# Patient Record
Sex: MALE | Race: WHITE | NOT HISPANIC OR LATINO | Employment: OTHER | URBAN - METROPOLITAN AREA
[De-identification: names, ages, dates, MRNs, and addresses within clinical notes are randomized per-mention and may not be internally consistent; named-entity substitution may affect disease eponyms.]

---

## 2017-02-21 RX ORDER — SIMVASTATIN 40 MG
TABLET ORAL
Qty: 90 TABLET | Refills: 0 | Status: SHIPPED | OUTPATIENT
Start: 2017-02-21 | End: 2021-10-06 | Stop reason: SDUPTHER

## 2017-10-13 ENCOUNTER — TRANSCRIBE ORDERS (OUTPATIENT)
Dept: ADMINISTRATIVE | Facility: HOSPITAL | Age: 72
End: 2017-10-13

## 2017-10-13 ENCOUNTER — HOSPITAL ENCOUNTER (OUTPATIENT)
Dept: GENERAL RADIOLOGY | Facility: HOSPITAL | Age: 72
Discharge: HOME | End: 2017-10-13
Attending: INTERNAL MEDICINE | Admitting: INTERNAL MEDICINE

## 2017-10-13 DIAGNOSIS — R05.9 COUGH: ICD-10-CM

## 2017-10-13 DIAGNOSIS — R05.9 COUGH: Primary | ICD-10-CM

## 2017-10-13 PROCEDURE — 71020 HC CHEST PA AND LATERAL: CPT

## 2017-10-16 ENCOUNTER — TRANSCRIBE ORDERS (OUTPATIENT)
Dept: ADMINISTRATIVE | Facility: HOSPITAL | Age: 72
End: 2017-10-16

## 2017-10-16 DIAGNOSIS — J90 PLEURAL EFFUSION: Primary | ICD-10-CM

## 2017-10-18 ENCOUNTER — APPOINTMENT (OUTPATIENT)
Dept: CT IMAGING | Facility: HOSPITAL | Age: 72
End: 2017-10-18
Attending: INTERNAL MEDICINE

## 2018-05-08 ENCOUNTER — TRANSCRIBE ORDERS (OUTPATIENT)
Dept: ADMINISTRATIVE | Facility: HOSPITAL | Age: 73
End: 2018-05-08

## 2018-05-08 DIAGNOSIS — R31.0 GROSS HEMATURIA: Primary | ICD-10-CM

## 2018-05-14 ENCOUNTER — HOSPITAL ENCOUNTER (OUTPATIENT)
Dept: CT IMAGING | Facility: HOSPITAL | Age: 73
Discharge: HOME | End: 2018-05-14
Attending: UROLOGY | Admitting: UROLOGY

## 2018-05-14 DIAGNOSIS — R31.0 GROSS HEMATURIA: ICD-10-CM

## 2018-05-14 PROCEDURE — 0 IOPAMIDOL PER 1 ML: Performed by: UROLOGY

## 2018-05-14 PROCEDURE — 74178 CT ABD&PLV WO CNTR FLWD CNTR: CPT

## 2018-05-14 RX ADMIN — IOPAMIDOL 100 ML: 755 INJECTION, SOLUTION INTRAVENOUS at 07:50

## 2018-08-28 ENCOUNTER — TRANSCRIBE ORDERS (OUTPATIENT)
Dept: ADMINISTRATIVE | Facility: HOSPITAL | Age: 73
End: 2018-08-28

## 2018-08-28 ENCOUNTER — HOSPITAL ENCOUNTER (OUTPATIENT)
Dept: GENERAL RADIOLOGY | Facility: HOSPITAL | Age: 73
Discharge: HOME | End: 2018-08-28

## 2018-08-28 ENCOUNTER — HOSPITAL ENCOUNTER (OUTPATIENT)
Dept: GENERAL RADIOLOGY | Facility: HOSPITAL | Age: 73
Discharge: HOME | End: 2018-08-28
Admitting: NURSE PRACTITIONER

## 2018-08-28 DIAGNOSIS — M54.9 DORSALGIA: ICD-10-CM

## 2018-08-28 DIAGNOSIS — M15.0 PRIMARY GENERALIZED HYPERTROPHIC OSTEOARTHROSIS: ICD-10-CM

## 2018-08-28 DIAGNOSIS — M85.9 DISORDER OF BONE DENSITY AND STRUCTURE, UNSPECIFIED: Primary | ICD-10-CM

## 2018-08-28 DIAGNOSIS — M85.9 DISORDER OF BONE DENSITY AND STRUCTURE, UNSPECIFIED: ICD-10-CM

## 2018-08-28 PROCEDURE — 72110 X-RAY EXAM L-2 SPINE 4/>VWS: CPT

## 2018-08-28 PROCEDURE — 73521 X-RAY EXAM HIPS BI 2 VIEWS: CPT

## 2020-01-13 ENCOUNTER — HOSPITAL ENCOUNTER (OUTPATIENT)
Dept: GENERAL RADIOLOGY | Facility: HOSPITAL | Age: 75
Discharge: HOME | End: 2020-01-13
Admitting: INTERNAL MEDICINE

## 2020-01-13 ENCOUNTER — TRANSCRIBE ORDERS (OUTPATIENT)
Dept: ADMINISTRATIVE | Facility: HOSPITAL | Age: 75
End: 2020-01-13

## 2020-01-13 DIAGNOSIS — R05.3 CHRONIC COUGH: Primary | ICD-10-CM

## 2020-01-13 DIAGNOSIS — R05.3 CHRONIC COUGH: ICD-10-CM

## 2020-01-13 PROCEDURE — 71046 X-RAY EXAM CHEST 2 VIEWS: CPT

## 2020-06-09 ENCOUNTER — TRANSCRIBE ORDERS (OUTPATIENT)
Dept: OTHER | Facility: OTHER | Age: 75
End: 2020-06-09

## 2020-06-09 DIAGNOSIS — R18.8 OTHER ASCITES: Primary | ICD-10-CM

## 2021-08-30 NOTE — TELEPHONE ENCOUNTER
Rx Refill Note  Requested Prescriptions     Pending Prescriptions Disp Refills   • omeprazole (priLOSEC) 20 MG capsule [Pharmacy Med Name: OMEPRAZOLE 20 MG Capsule Delayed Release] 90 capsule      Sig: TAKE 1 CAPSULE DAILY 30 MINUTES BEFORE MORNING MEAL      Last office visit with prescribing clinician: Visit date not found      Next office visit with prescribing clinician: Visit date not found            Arleth Eddy MA  08/30/21, 07:34 EDT

## 2021-08-31 RX ORDER — OMEPRAZOLE 20 MG/1
CAPSULE, DELAYED RELEASE ORAL
Qty: 90 CAPSULE | Refills: 2 | Status: SHIPPED | OUTPATIENT
Start: 2021-08-31 | End: 2022-04-04

## 2021-10-06 ENCOUNTER — OFFICE VISIT (OUTPATIENT)
Dept: INTERNAL MEDICINE | Facility: CLINIC | Age: 76
End: 2021-10-06

## 2021-10-06 VITALS
SYSTOLIC BLOOD PRESSURE: 130 MMHG | HEIGHT: 66 IN | WEIGHT: 160.8 LBS | RESPIRATION RATE: 16 BRPM | OXYGEN SATURATION: 98 % | BODY MASS INDEX: 25.84 KG/M2 | TEMPERATURE: 96.3 F | HEART RATE: 61 BPM | DIASTOLIC BLOOD PRESSURE: 78 MMHG

## 2021-10-06 DIAGNOSIS — N30.00 ACUTE CYSTITIS WITHOUT HEMATURIA: Primary | ICD-10-CM

## 2021-10-06 DIAGNOSIS — M05.9 RHEUMATOID ARTHRITIS WITH POSITIVE RHEUMATOID FACTOR, INVOLVING UNSPECIFIED SITE (HCC): ICD-10-CM

## 2021-10-06 LAB
CLARITY, POC: CLEAR
COLOR UR: YELLOW
GLUCOSE UR STRIP-MCNC: NEGATIVE MG/DL
KETONES UR QL: NEGATIVE
LEUKOCYTE EST, POC: ABNORMAL
NITRITE UR-MCNC: NEGATIVE MG/ML
PH UR: 6 [PH] (ref 6–8)
PROT UR STRIP-MCNC: ABNORMAL MG/DL
RBC # UR STRIP: NEGATIVE /UL
SP GR UR: 1.03 (ref 1–1.03)

## 2021-10-06 PROCEDURE — 81002 URINALYSIS NONAUTO W/O SCOPE: CPT | Performed by: INTERNAL MEDICINE

## 2021-10-06 PROCEDURE — 99204 OFFICE O/P NEW MOD 45 MIN: CPT | Performed by: INTERNAL MEDICINE

## 2021-10-06 RX ORDER — CARVEDILOL 12.5 MG/1
TABLET ORAL
COMMUNITY
Start: 2021-08-30

## 2021-10-06 RX ORDER — TAMSULOSIN HYDROCHLORIDE 0.4 MG/1
1 CAPSULE ORAL DAILY
COMMUNITY

## 2021-10-06 RX ORDER — ATORVASTATIN CALCIUM 20 MG/1
1 TABLET, FILM COATED ORAL
COMMUNITY
Start: 2021-08-30

## 2021-10-06 RX ORDER — FUROSEMIDE 40 MG/1
TABLET ORAL
COMMUNITY
Start: 2021-08-30

## 2021-10-06 NOTE — PROGRESS NOTES
"Chief Complaint  Urinary Tract Infection (follow up from urologist )    Subjective          Moises Miranda presents to [facility]  Here with difficulty urinating, urinating more often, urgency; no dysuria; previously took tamsulosin 0.4mg bid and furosemide for this he states from urology; he recently changed the tamsulosin to 0.4mg daily instead of bid; did have cysitis when seen by urology 3 months ago    HFpEF and HFrEF, last ef 34%, follows with cardiology      Objective   Vital Signs:   /78 (BP Location: Right arm, Patient Position: Sitting, Cuff Size: Adult)   Pulse 61   Temp 96.3 °F (35.7 °C)   Resp 16   Ht 167.6 cm (66\")   Wt 72.9 kg (160 lb 12.8 oz)   SpO2 98%   BMI 25.95 kg/m²     Physical Exam  Constitutional:       Appearance: Normal appearance.   HENT:      Head: Normocephalic and atraumatic.      Right Ear: External ear normal.      Left Ear: External ear normal.      Nose: Nose normal.      Mouth/Throat:      Mouth: Mucous membranes are moist.   Eyes:      Extraocular Movements: Extraocular movements intact.      Conjunctiva/sclera: Conjunctivae normal.   Pulmonary:      Effort: Pulmonary effort is normal. No respiratory distress.   Musculoskeletal:         General: Normal range of motion.      Cervical back: Normal range of motion.   Neurological:      General: No focal deficit present.      Mental Status: He is alert. Mental status is at baseline.   Psychiatric:         Mood and Affect: Mood normal.         Behavior: Behavior normal.         Thought Content: Thought content normal.         Judgment: Judgment normal.        Result Review :                 Assessment and Plan    Diagnoses and all orders for this visit:    1. Acute cystitis without hematuria (Primary)  -     POCT urinalysis dipstick, manual  -     Urine Culture - Urine, Urine, Clean Catch    2. Rheumatoid arthritis with positive rheumatoid factor, involving unspecified site " (HCC)  -     Comprehensive metabolic panel  -     CBC w AUTO Differential    - check labs as above for stream issues  - tamsulosin 0.4mg daily dosing, consider increasing to 0.8mg dosing if needed  - check labs for RA, has not had in some time  - continue MTX, doing well    Follow Up   Return in about 3 months (around 1/6/2022) for Medicare Wellness.  Patient was given instructions and counseling regarding his condition or for health maintenance advice. Please see specific information pulled into the AVS if appropriate.

## 2021-10-07 ENCOUNTER — TELEPHONE (OUTPATIENT)
Dept: INTERNAL MEDICINE | Facility: CLINIC | Age: 76
End: 2021-10-07

## 2021-10-07 LAB
ALBUMIN SERPL-MCNC: 4.3 G/DL (ref 3.5–5.2)
ALBUMIN/GLOB SERPL: 2.9 G/DL
ALP SERPL-CCNC: 61 U/L (ref 39–117)
ALT SERPL-CCNC: 27 U/L (ref 1–41)
AST SERPL-CCNC: 25 U/L (ref 1–40)
BASOPHILS # BLD AUTO: ABNORMAL 10*3/UL
BASOPHILS # BLD MANUAL: 0.15 10*3/MM3 (ref 0–0.2)
BASOPHILS NFR BLD MANUAL: 2.1 % (ref 0–1.5)
BILIRUB SERPL-MCNC: 1.5 MG/DL (ref 0–1.2)
BUN SERPL-MCNC: 18 MG/DL (ref 8–23)
BUN/CREAT SERPL: 16.8 (ref 7–25)
CALCIUM SERPL-MCNC: 9.4 MG/DL (ref 8.6–10.5)
CHLORIDE SERPL-SCNC: 108 MMOL/L (ref 98–107)
CO2 SERPL-SCNC: 26.2 MMOL/L (ref 22–29)
CREAT SERPL-MCNC: 1.07 MG/DL (ref 0.76–1.27)
DIFFERENTIAL COMMENT: ABNORMAL
EOSINOPHIL # BLD AUTO: ABNORMAL 10*3/UL
EOSINOPHIL # BLD MANUAL: 0.3 10*3/MM3 (ref 0–0.4)
EOSINOPHIL NFR BLD AUTO: ABNORMAL %
EOSINOPHIL NFR BLD MANUAL: 4.1 % (ref 0.3–6.2)
ERYTHROCYTE [DISTWIDTH] IN BLOOD BY AUTOMATED COUNT: 13.5 % (ref 12.3–15.4)
GLOBULIN SER CALC-MCNC: 1.5 GM/DL
GLUCOSE SERPL-MCNC: 97 MG/DL (ref 65–99)
HCT VFR BLD AUTO: 40.5 % (ref 37.5–51)
HGB BLD-MCNC: 13.4 G/DL (ref 13–17.7)
LYMPHOCYTES # BLD AUTO: ABNORMAL 10*3/UL
LYMPHOCYTES # BLD MANUAL: 1.05 10*3/MM3 (ref 0.7–3.1)
LYMPHOCYTES NFR BLD AUTO: ABNORMAL %
LYMPHOCYTES NFR BLD MANUAL: 14.4 % (ref 19.6–45.3)
MCH RBC QN AUTO: 34.8 PG (ref 26.6–33)
MCHC RBC AUTO-ENTMCNC: 33.1 G/DL (ref 31.5–35.7)
MCV RBC AUTO: 105.2 FL (ref 79–97)
MONOCYTES # BLD MANUAL: 0.23 10*3/MM3 (ref 0.1–0.9)
MONOCYTES NFR BLD AUTO: ABNORMAL %
MONOCYTES NFR BLD MANUAL: 3.1 % (ref 5–12)
NEUTROPHILS # BLD MANUAL: 5.59 10*3/MM3 (ref 1.7–7)
NEUTROPHILS NFR BLD AUTO: ABNORMAL %
NEUTROPHILS NFR BLD MANUAL: 76.3 % (ref 42.7–76)
PLATELET # BLD AUTO: 154 10*3/MM3 (ref 140–450)
PLATELET BLD QL SMEAR: ABNORMAL
POTASSIUM SERPL-SCNC: 4.2 MMOL/L (ref 3.5–5.2)
PROT SERPL-MCNC: 5.8 G/DL (ref 6–8.5)
RBC # BLD AUTO: 3.85 10*6/MM3 (ref 4.14–5.8)
RBC MORPH BLD: ABNORMAL
SODIUM SERPL-SCNC: 145 MMOL/L (ref 136–145)
WBC # BLD AUTO: 7.32 10*3/MM3 (ref 3.4–10.8)

## 2021-10-07 NOTE — TELEPHONE ENCOUNTER
Caller: [email or web address]    Relationship: Emergency Contact    Best call back number: [phone]    Caller requesting test results: BLOOD WORK AND URINALYSIS    What test was performed: LABS    When was the test performed: 10/07/21    Where was the test performed: OFFICE/[facility]    Additional notes: PATIENT'S WIFE, ON [facility] [employer], CALLED IN STATING SHE MISSED A PHONE CALL FROM THE OFFICE.     PLEASE CALL BACK TO DISCUSS THE RESULTS OF PATIENT'S LABS.

## 2021-10-08 LAB
BACTERIA UR CULT: NORMAL
BACTERIA UR CULT: NORMAL

## 2021-10-15 LAB
FOLATE SERPL-MCNC: >20 NG/ML (ref 4.78–24.2)
HCYS SERPL-SCNC: 15.7 UMOL/L (ref 0–19.2)
Lab: NORMAL
Lab: NORMAL
METHYLMALONATE SERPL-SCNC: 139 NMOL/L (ref 0–378)
VIT B12 SERPL-MCNC: 438 PG/ML (ref 211–946)
WRITTEN AUTHORIZATION: NORMAL

## 2022-03-03 ENCOUNTER — OFFICE VISIT (OUTPATIENT)
Dept: INTERNAL MEDICINE | Facility: CLINIC | Age: 77
End: 2022-03-03

## 2022-03-03 VITALS
BODY MASS INDEX: 27.05 KG/M2 | DIASTOLIC BLOOD PRESSURE: 80 MMHG | WEIGHT: 168.3 LBS | OXYGEN SATURATION: 99 % | RESPIRATION RATE: 18 BRPM | HEART RATE: 75 BPM | SYSTOLIC BLOOD PRESSURE: 124 MMHG | HEIGHT: 66 IN | TEMPERATURE: 97.7 F

## 2022-03-03 DIAGNOSIS — I10 PRIMARY HYPERTENSION: ICD-10-CM

## 2022-03-03 DIAGNOSIS — Z00.00 WELL ADULT EXAM: Primary | ICD-10-CM

## 2022-03-03 DIAGNOSIS — Z11.4 ENCOUNTER FOR SCREENING FOR HIV: ICD-10-CM

## 2022-03-03 DIAGNOSIS — E53.8 FOLIC ACID DEFICIENCY: ICD-10-CM

## 2022-03-03 DIAGNOSIS — H61.22 HEARING LOSS OF LEFT EAR DUE TO CERUMEN IMPACTION: ICD-10-CM

## 2022-03-03 DIAGNOSIS — I25.5 ISCHEMIC CARDIOMYOPATHY: ICD-10-CM

## 2022-03-03 DIAGNOSIS — Z11.59 ENCOUNTER FOR HCV SCREENING TEST FOR LOW RISK PATIENT: ICD-10-CM

## 2022-03-03 LAB
BILIRUB BLD-MCNC: NEGATIVE MG/DL
CLARITY, POC: CLEAR
COLOR UR: YELLOW
EXPIRATION DATE: NORMAL
GLUCOSE UR STRIP-MCNC: NEGATIVE MG/DL
KETONES UR QL: NEGATIVE
LEUKOCYTE EST, POC: NEGATIVE
Lab: NORMAL
NITRITE UR-MCNC: NEGATIVE MG/ML
PH UR: 6.5 [PH] (ref 5–8)
PROT UR STRIP-MCNC: NEGATIVE MG/DL
RBC # UR STRIP: NEGATIVE /UL
SP GR UR: 1.01 (ref 1–1.03)
UROBILINOGEN UR QL: NORMAL

## 2022-03-03 PROCEDURE — 1170F FXNL STATUS ASSESSED: CPT | Performed by: INTERNAL MEDICINE

## 2022-03-03 PROCEDURE — G0009 ADMIN PNEUMOCOCCAL VACCINE: HCPCS | Performed by: INTERNAL MEDICINE

## 2022-03-03 PROCEDURE — 1126F AMNT PAIN NOTED NONE PRSNT: CPT | Performed by: INTERNAL MEDICINE

## 2022-03-03 PROCEDURE — G0439 PPPS, SUBSEQ VISIT: HCPCS | Performed by: INTERNAL MEDICINE

## 2022-03-03 PROCEDURE — 90670 PCV13 VACCINE IM: CPT | Performed by: INTERNAL MEDICINE

## 2022-03-03 PROCEDURE — 81003 URINALYSIS AUTO W/O SCOPE: CPT | Performed by: INTERNAL MEDICINE

## 2022-03-03 PROCEDURE — 90715 TDAP VACCINE 7 YRS/> IM: CPT | Performed by: INTERNAL MEDICINE

## 2022-03-03 PROCEDURE — 1159F MED LIST DOCD IN RCRD: CPT | Performed by: INTERNAL MEDICINE

## 2022-03-03 NOTE — PROGRESS NOTES
"Chief Complaint  Annual Exam    Subjective     {Problem List  Visit Diagnosis   Encounters  Notes  Medications  Labs  Result Review Imaging  Media :23}     Moises Miranda presents to [facility]  HLD, HTN    RA, following with rheum, doing well without issues      Objective   Vital Signs:   /80   Pulse 75   Temp 97.7 °F (36.5 °C)   Resp 18   Ht 167.6 cm (66\")   Wt 76.3 kg (168 lb 4.8 oz)   SpO2 99%   BMI 27.16 kg/m²     Physical Exam  Vitals and nursing note reviewed.   Constitutional:       General: He is not in acute distress.     Appearance: Normal appearance.   HENT:      Head: Normocephalic and atraumatic.      Right Ear: External ear normal.      Left Ear: External ear normal.      Nose: Nose normal.      Mouth/Throat:      Mouth: Mucous membranes are moist.      Pharynx: Oropharynx is clear.   Eyes:      Extraocular Movements: Extraocular movements intact.      Conjunctiva/sclera: Conjunctivae normal.      Pupils: Pupils are equal, round, and reactive to light.   Cardiovascular:      Rate and Rhythm: Normal rate and regular rhythm.      Pulses: Normal pulses.      Heart sounds: Normal heart sounds. No murmur heard.  No gallop.    Pulmonary:      Effort: Pulmonary effort is normal.      Breath sounds: Normal breath sounds.   Abdominal:      General: Abdomen is flat. Bowel sounds are normal. There is no distension.      Palpations: Abdomen is soft. There is no mass.      Tenderness: There is no abdominal tenderness.   Musculoskeletal:         General: No swelling. Normal range of motion.      Cervical back: Normal range of motion and neck supple.   Skin:     General: Skin is warm and dry.      Findings: No rash.   Neurological:      General: No focal deficit present.      Mental Status: He is alert and oriented to person, place, and time. Mental status is at baseline.   Psychiatric:         Mood and Affect: Mood normal.         Behavior: " Behavior normal.        Result Review :{Labs  Result Review  Imaging  Med Tab  Media  Procedures :23}   {The following data was reviewed by (Optional):59649}  {Ambulatory Labs (Optional):39818}  {Data reviewed (Optional):34945:::1}          Assessment and Plan {CC Problem List  Visit Diagnosis   ROS  Review (Popup)  Health Maintenance  Quality  BestPractice  Medications  SmartSets  SnapShot Encounters  Media :23}   Diagnoses and all orders for this visit:    1. Well adult exam (Primary)      {Time Spent (Optional):25047}  Follow Up {Instructions Charge Capture  Follow-up Communications :23}  No follow-ups on file.  Patient was given instructions and counseling regarding his condition or for health maintenance advice. Please see specific information pulled into the AVS if appropriate.

## 2022-03-03 NOTE — PROGRESS NOTES
The ABCs of the Annual Wellness Visit  Subsequent Medicare Wellness Visit    Chief Complaint   Patient presents with   • Annual Exam      Subjective    History of Present Illness:  Moises Miranda is a 76 y.o. male who presents for a Subsequent Medicare Wellness Visit. Recent cardiology visit, had his pacemaker rate increased    The following portions of the patient's history were reviewed and   updated as appropriate: allergies, current medications, past family history, past medical history, past social history, past surgical history and problem list.    Compared to one year ago, the patient feels his physical   health is the same.    Compared to one year ago, the patient feels his mental   health is the same.    Recent Hospitalizations:  He was not admitted to the hospital during the last year.       Current Medical Providers:  Patient Care Team:  Pedrito Stapels MD as PCP - General (Internal Medicine)    Outpatient Medications Prior to Visit   Medication Sig Dispense Refill   • alendronate (FOSAMAX) 70 MG tablet Take 70 mg by mouth every 7 days.     • atorvastatin (Lipitor) 20 MG tablet Take 1 tablet by mouth.     • carvedilol (COREG) 12.5 MG tablet TAKE 1/2 TABLET TWICE DAILY WITH MEALS     • folic acid (FOLVITE) 1 MG tablet Take 1 mg by mouth 3 (three) times a day.     • furosemide (LASIX) 40 MG tablet 1/2 tab PO q daily     • methotrexate (RHEUMATREX) 2.5 MG tablet Take 2.5 mg by mouth every 12 hours for 3 doses only each week.     • mupirocin (BACTROBAN) 2 % ointment Apply topically 3 (three) times a day 30 g 1   • omeprazole (priLOSEC) 20 MG capsule TAKE 1 CAPSULE DAILY 30 MINUTES BEFORE MORNING MEAL 90 capsule 2   • predniSONE (DELTASONE) 1 MG tablet Take 1 mg by mouth daily.     • tamsulosin (FLOMAX) 0.4 MG capsule 24 hr capsule Take 1 capsule by mouth Daily.     • doxycycline (VIBRAMYCIN) 100 MG capsule Take 1 capsule by mouth 2 (two) times a day. 28 capsule 0   • pantoprazole (PROTONIX) 40 MG EC tablet  "TAKE 1 TABLET EVERY DAY 90 tablet 1     No facility-administered medications prior to visit.       No opioid medication identified on active medication list. I have reviewed chart for other potential  high risk medication/s and harmful drug interactions in the elderly.          Aspirin is not on active medication list.  Aspirin use is not indicated based on review of current medical condition/s. Risk of harm outweighs potential benefits.  .    Patient Active Problem List   Diagnosis   • Hyperlipidemia   • GERD (gastroesophageal reflux disease)   • Rheumatoid arthritis (HCC)   • Prostate cancer (HCC)     Advance Care Planning  Advance Directive is not on file.  ACP discussion was held with the patient during this visit. Patient does not have an advance directive, information provided.          Objective    Vitals:    03/03/22 1024   BP: 124/80   Pulse: 75   Resp: 18   Temp: 97.7 °F (36.5 °C)   SpO2: 99%   Weight: 76.3 kg (168 lb 4.8 oz)   Height: 167.6 cm (66\")     BMI Readings from Last 1 Encounters:   03/03/22 27.16 kg/m²   BMI is above normal parameters. Recommendations include: exercise counseling and nutrition counseling    Does the patient have evidence of cognitive impairment? No    Physical Exam            HEALTH RISK ASSESSMENT    Smoking Status:  Social History     Tobacco Use   Smoking Status Former Smoker   Smokeless Tobacco Never Used   Tobacco Comment    quit 1990     Alcohol Consumption:  Social History     Substance and Sexual Activity   Alcohol Use Yes   • Alcohol/week: 3.0 standard drinks   • Types: 3 Cans of beer per week     Fall Risk Screen:    SHAYLA Fall Risk Assessment was completed, and patient is at LOW risk for falls.Assessment completed on:3/3/2022    Depression Screening:  PHQ-2/PHQ-9 Depression Screening 3/3/2022   Little interest or pleasure in doing things 0   Feeling down, depressed, or hopeless 0   Total Score 0       Health Habits and Functional and Cognitive Screening:  Functional " & Cognitive Status 3/3/2022   Do you have difficulty preparing food and eating? No   Do you have difficulty bathing yourself, getting dressed or grooming yourself? No   Do you have difficulty using the toilet? No   Do you have difficulty moving around from place to place? No   Do you have trouble with steps or getting out of a bed or a chair? No   Current Diet Well Balanced Diet   Dental Exam Up to date   Eye Exam Up to date   Exercise (times per week) 7 times per week   Current Exercises Include Light Weights;Walking   Do you need help using the phone?  No   Are you deaf or do you have serious difficulty hearing?  Yes   Do you need help with transportation? No   Do you need help shopping? No   Do you need help preparing meals?  No   Do you need help with housework?  No   Do you need help with laundry? No   Do you need help taking your medications? No   Do you need help managing money? No   Do you ever drive or ride in a car without wearing a seat belt? No   Have you felt unusual stress, anger or loneliness in the last month? No   Who do you live with? Spouse   If you need help, do you have trouble finding someone available to you? No   Have you been bothered in the last four weeks by sexual problems? No   Do you have difficulty concentrating, remembering or making decisions? No       Age-appropriate Screening Schedule:  Refer to the list below for future screening recommendations based on patient's age, sex and/or medical conditions. Orders for these recommended tests are listed in the plan section. The patient has been provided with a written plan.    Health Maintenance   Topic Date Due   • TDAP/TD VACCINES (1 - Tdap) Never done   • ZOSTER VACCINE (1 of 2) Never done   • LIPID PANEL  06/18/2021   • INFLUENZA VACCINE  08/01/2021              Assessment/Plan   CMS Preventative Services Quick Reference  Risk Factors Identified During Encounter  Cardiovascular Disease  Immunizations Discussed/Encouraged (specific  Immunizations; Tdap and Pneumococcal 23  The above risks/problems have been discussed with the patient.  Follow up actions/plans if indicated are seen below in the Assessment/Plan Section.  Pertinent information has been shared with the patient in the After Visit Summary.    Diagnoses and all orders for this visit:    1. Well adult exam (Primary)  -     Comprehensive Metabolic Panel  -     Lipid Panel  -     Hemoglobin A1c  -     CBC & Differential    2. Encounter for screening for HIV  -     HIV-1 / O / 2 Ag / Antibody 4th Generation    3. Encounter for HCV screening test for low risk patient  -     Hepatitis C Antibody    4. Primary hypertension  -     POCT urinalysis dipstick, automated    5. Hearing loss of left ear due to cerumen impaction  -     Ear Cerumen Removal    6. Folic acid deficiency  -     Vitamin B12  -     Folate    Other orders  -     Pneumococcal Conjugate Vaccine 13-Valent All (PCV13)  -     Tdap Vaccine Greater Than or Equal To 8yo IM      Ischemic cardiomyopathy with BiV/ICD  - EF 34% 6/2021; continue statin, bb, notes mention entresto but he does not have this on his record today, will get meds up to date from his home list    Well adult exam  - labs checked and evaluated    Colonoscopy - 2/13/20, polypectomy x1, next in 2023  Prostate - following with urology  Glaucoma - yearly  AAA - previous, quit over 30 years ago, scheduling  Lung cancer - previous, quit over 30 years ago  HIV - checking  HCV - checking  DM - checking  HLD - checking  Smoking - previous, quit over 30 years ago  Depression - no  Vaccines - pcv13, tdap today; ppsv last in 9/2017  Falls - no  Alcohol Screening - no    Discussed mental health, sexual health, substance use, abuse, anticipatory guidance given.        Follow Up:   No follow-ups on file.     An After Visit Summary and PPPS were made available to the patient.

## 2022-03-04 LAB
ALBUMIN SERPL-MCNC: 4.3 G/DL (ref 3.7–4.7)
ALBUMIN/GLOB SERPL: 2.2 {RATIO} (ref 1.2–2.2)
ALP SERPL-CCNC: 105 IU/L (ref 44–121)
ALT SERPL-CCNC: 40 IU/L (ref 0–44)
AST SERPL-CCNC: 25 IU/L (ref 0–40)
BASOPHILS # BLD AUTO: 0.1 X10E3/UL (ref 0–0.2)
BASOPHILS NFR BLD AUTO: 1 %
BILIRUB SERPL-MCNC: 0.9 MG/DL (ref 0–1.2)
BUN SERPL-MCNC: 15 MG/DL (ref 8–27)
BUN/CREAT SERPL: 14 (ref 10–24)
CALCIUM SERPL-MCNC: 9.3 MG/DL (ref 8.6–10.2)
CHLORIDE SERPL-SCNC: 106 MMOL/L (ref 96–106)
CHOLEST SERPL-MCNC: 178 MG/DL (ref 100–199)
CO2 SERPL-SCNC: 23 MMOL/L (ref 20–29)
CREAT SERPL-MCNC: 1.11 MG/DL (ref 0.76–1.27)
EGFR GENE MUT ANL BLD/T: 69 ML/MIN/1.73
EOSINOPHIL # BLD AUTO: 0.2 X10E3/UL (ref 0–0.4)
EOSINOPHIL NFR BLD AUTO: 2 %
ERYTHROCYTE [DISTWIDTH] IN BLOOD BY AUTOMATED COUNT: 13.6 % (ref 11.6–15.4)
FOLATE SERPL-MCNC: >20 NG/ML
GLOBULIN SER CALC-MCNC: 2 G/DL (ref 1.5–4.5)
GLUCOSE SERPL-MCNC: 93 MG/DL (ref 65–99)
HBA1C MFR BLD: 5.8 % (ref 4.8–5.6)
HCT VFR BLD AUTO: 40.5 % (ref 37.5–51)
HCV AB S/CO SERPL IA: <0.1 S/CO RATIO (ref 0–0.9)
HDLC SERPL-MCNC: 48 MG/DL
HGB BLD-MCNC: 13.7 G/DL (ref 13–17.7)
HIV 1+2 AB+HIV1 P24 AG SERPL QL IA: NON REACTIVE
IMM GRANULOCYTES # BLD AUTO: 0.1 X10E3/UL (ref 0–0.1)
IMM GRANULOCYTES NFR BLD AUTO: 1 %
LDLC SERPL CALC-MCNC: 88 MG/DL (ref 0–99)
LYMPHOCYTES # BLD AUTO: 1.1 X10E3/UL (ref 0.7–3.1)
LYMPHOCYTES NFR BLD AUTO: 15 %
MCH RBC QN AUTO: 34.5 PG (ref 26.6–33)
MCHC RBC AUTO-ENTMCNC: 33.8 G/DL (ref 31.5–35.7)
MCV RBC AUTO: 102 FL (ref 79–97)
MONOCYTES # BLD AUTO: 0.6 X10E3/UL (ref 0.1–0.9)
MONOCYTES NFR BLD AUTO: 8 %
NEUTROPHILS # BLD AUTO: 5.5 X10E3/UL (ref 1.4–7)
NEUTROPHILS NFR BLD AUTO: 73 %
PLATELET # BLD AUTO: 183 X10E3/UL (ref 150–450)
POTASSIUM SERPL-SCNC: 4.2 MMOL/L (ref 3.5–5.2)
PROT SERPL-MCNC: 6.3 G/DL (ref 6–8.5)
RBC # BLD AUTO: 3.97 X10E6/UL (ref 4.14–5.8)
SODIUM SERPL-SCNC: 145 MMOL/L (ref 134–144)
TRIGL SERPL-MCNC: 253 MG/DL (ref 0–149)
VIT B12 SERPL-MCNC: 498 PG/ML (ref 232–1245)
VLDLC SERPL CALC-MCNC: 42 MG/DL (ref 5–40)
WBC # BLD AUTO: 7.6 X10E3/UL (ref 3.4–10.8)

## 2022-03-25 ENCOUNTER — TRANSCRIBE ORDERS (OUTPATIENT)
Dept: ADMINISTRATIVE | Facility: HOSPITAL | Age: 77
End: 2022-03-25

## 2022-03-25 DIAGNOSIS — M85.9 DISORDER OF BONE DENSITY AND STRUCTURE, UNSPECIFIED: Primary | ICD-10-CM

## 2022-03-31 ENCOUNTER — APPOINTMENT (OUTPATIENT)
Dept: BONE DENSITY | Facility: HOSPITAL | Age: 77
End: 2022-03-31

## 2022-03-31 DIAGNOSIS — M85.9 DISORDER OF BONE DENSITY AND STRUCTURE, UNSPECIFIED: ICD-10-CM

## 2022-03-31 PROCEDURE — 77080 DXA BONE DENSITY AXIAL: CPT

## 2022-04-01 NOTE — TELEPHONE ENCOUNTER
Rx Refill Note  Requested Prescriptions     Pending Prescriptions Disp Refills   • omeprazole (priLOSEC) 20 MG capsule [Pharmacy Med Name: OMEPRAZOLE 20 MG Capsule Delayed Release] 90 capsule 2     Sig: TAKE 1 CAPSULE DAILY 30 MINUTES BEFORE MORNING MEAL      Last office visit with prescribing clinician: 3/3/2022      Next office visit with prescribing clinician: 9/7/2022            Arleth Eddy MA  04/01/22, 08:03 EDT

## 2022-04-04 RX ORDER — OMEPRAZOLE 20 MG/1
CAPSULE, DELAYED RELEASE ORAL
Qty: 90 CAPSULE | Refills: 2 | Status: SHIPPED | OUTPATIENT
Start: 2022-04-04 | End: 2022-11-04

## 2022-06-09 ENCOUNTER — OFFICE VISIT (OUTPATIENT)
Dept: INTERNAL MEDICINE | Facility: CLINIC | Age: 77
End: 2022-06-09

## 2022-06-09 VITALS
DIASTOLIC BLOOD PRESSURE: 82 MMHG | WEIGHT: 164 LBS | BODY MASS INDEX: 26.36 KG/M2 | HEIGHT: 66 IN | OXYGEN SATURATION: 97 % | SYSTOLIC BLOOD PRESSURE: 118 MMHG | RESPIRATION RATE: 18 BRPM | HEART RATE: 74 BPM | TEMPERATURE: 98.6 F

## 2022-06-09 DIAGNOSIS — R22.42 KNEE MASS, LEFT: ICD-10-CM

## 2022-06-09 DIAGNOSIS — E53.8 FOLIC ACID DEFICIENCY: ICD-10-CM

## 2022-06-09 DIAGNOSIS — R73.01 ELEVATED FASTING GLUCOSE: ICD-10-CM

## 2022-06-09 DIAGNOSIS — I10 PRIMARY HYPERTENSION: Primary | ICD-10-CM

## 2022-06-09 PROCEDURE — 99214 OFFICE O/P EST MOD 30 MIN: CPT | Performed by: INTERNAL MEDICINE

## 2022-06-09 NOTE — PROGRESS NOTES
"Chief Complaint  knot on left knee (First noticed x 1 week ago /Not painful /)    Subjective        Moises Miranda presents to [facility]  Here with left knee mass, unknown duration he states but has noticed the last 1 week; no pain, no swelling; no redness      Objective   Vital Signs:  /82   Pulse 74   Temp 98.6 °F (37 °C)   Resp 18   Ht 167.6 cm (66\")   Wt 74.4 kg (164 lb)   SpO2 97%   BMI 26.47 kg/m²   Estimated body mass index is 26.47 kg/m² as calculated from the following:    Height as of this encounter: 167.6 cm (66\").    Weight as of this encounter: 74.4 kg (164 lb).          Physical Exam  Constitutional:       Appearance: Normal appearance.   HENT:      Head: Normocephalic and atraumatic.      Right Ear: External ear normal.      Left Ear: External ear normal.      Nose: Nose normal.      Mouth/Throat:      Mouth: Mucous membranes are moist.   Eyes:      Extraocular Movements: Extraocular movements intact.      Conjunctiva/sclera: Conjunctivae normal.   Pulmonary:      Effort: Pulmonary effort is normal. No respiratory distress.   Musculoskeletal:         General: Normal range of motion.      Cervical back: Normal range of motion.      Comments: Left lateral knee with firm spongy mass 2x3cm just lateral to joint line, non mobile, no pain, no redness   Neurological:      General: No focal deficit present.      Mental Status: He is alert. Mental status is at baseline.   Psychiatric:         Mood and Affect: Mood normal.         Behavior: Behavior normal.         Thought Content: Thought content normal.         Judgment: Judgment normal.        Result Review :                Assessment and Plan   Diagnoses and all orders for this visit:    1. Primary hypertension (Primary)  -     Comprehensive Metabolic Panel  -     Lipid Panel With / Chol / HDL Ratio  -     Hemoglobin A1c    2. Folic acid deficiency  -     Folate    3. Knee mass, left  -     XR " Knee 1 or 2 View Left    4. Elevated fasting glucose   -     Hemoglobin A1c    - check xray as above; suspect may need further imaging with u/s vs ct/mri; further managemnet pending xray; conisder loculated effusion though less likely, consider RA changes  - check labs as above for routine follow up  - rtc pending above         Follow Up   No follow-ups on file.  Patient was given instructions and counseling regarding his condition or for health maintenance advice. Please see specific information pulled into the AVS if appropriate.

## 2022-06-10 LAB
ALBUMIN SERPL-MCNC: 3.9 G/DL (ref 3.7–4.7)
ALBUMIN/GLOB SERPL: 2 {RATIO} (ref 1.2–2.2)
ALP SERPL-CCNC: 74 IU/L (ref 44–121)
ALT SERPL-CCNC: 20 IU/L (ref 0–44)
AST SERPL-CCNC: 23 IU/L (ref 0–40)
BILIRUB SERPL-MCNC: 1 MG/DL (ref 0–1.2)
BUN SERPL-MCNC: 18 MG/DL (ref 8–27)
BUN/CREAT SERPL: 14 (ref 10–24)
CALCIUM SERPL-MCNC: 9.1 MG/DL (ref 8.6–10.2)
CHLORIDE SERPL-SCNC: 104 MMOL/L (ref 96–106)
CHOLEST SERPL-MCNC: 147 MG/DL (ref 100–199)
CHOLEST/HDLC SERPL: 4.6 RATIO (ref 0–5)
CO2 SERPL-SCNC: 25 MMOL/L (ref 20–29)
CREAT SERPL-MCNC: 1.28 MG/DL (ref 0.76–1.27)
EGFRCR SERPLBLD CKD-EPI 2021: 58 ML/MIN/1.73
FOLATE SERPL-MCNC: >20 NG/ML
GLOBULIN SER CALC-MCNC: 2 G/DL (ref 1.5–4.5)
GLUCOSE SERPL-MCNC: 110 MG/DL (ref 65–99)
HBA1C MFR BLD: 6 % (ref 4.8–5.6)
HDLC SERPL-MCNC: 32 MG/DL
LDLC SERPL CALC-MCNC: 85 MG/DL (ref 0–99)
POTASSIUM SERPL-SCNC: 4.6 MMOL/L (ref 3.5–5.2)
PROT SERPL-MCNC: 5.9 G/DL (ref 6–8.5)
SODIUM SERPL-SCNC: 142 MMOL/L (ref 134–144)
TRIGL SERPL-MCNC: 175 MG/DL (ref 0–149)
VLDLC SERPL CALC-MCNC: 30 MG/DL (ref 5–40)

## 2022-06-14 ENCOUNTER — HOSPITAL ENCOUNTER (OUTPATIENT)
Dept: GENERAL RADIOLOGY | Facility: HOSPITAL | Age: 77
Discharge: HOME | End: 2022-06-14
Admitting: INTERNAL MEDICINE

## 2022-06-14 PROCEDURE — 73560 X-RAY EXAM OF KNEE 1 OR 2: CPT

## 2022-06-15 ENCOUNTER — OFFICE VISIT (OUTPATIENT)
Dept: INTERNAL MEDICINE | Facility: CLINIC | Age: 77
End: 2022-06-15

## 2022-06-15 VITALS
OXYGEN SATURATION: 94 % | SYSTOLIC BLOOD PRESSURE: 120 MMHG | DIASTOLIC BLOOD PRESSURE: 74 MMHG | HEIGHT: 66 IN | TEMPERATURE: 98.6 F | WEIGHT: 164 LBS | BODY MASS INDEX: 26.36 KG/M2 | RESPIRATION RATE: 18 BRPM | HEART RATE: 84 BPM

## 2022-06-15 DIAGNOSIS — B34.9 VIRAL INFECTION: Primary | ICD-10-CM

## 2022-06-15 LAB
EXPIRATION DATE: NORMAL
FLUAV RNA RESP QL NAA+PROBE: NEGATIVE
FLUBV RNA RESP QL NAA+PROBE: NEGATIVE
INTERNAL CONTROL: NORMAL
Lab: NORMAL

## 2022-06-15 PROCEDURE — 87502 INFLUENZA DNA AMP PROBE: CPT | Performed by: INTERNAL MEDICINE

## 2022-06-15 PROCEDURE — 99213 OFFICE O/P EST LOW 20 MIN: CPT | Performed by: INTERNAL MEDICINE

## 2022-06-15 RX ORDER — AMOXICILLIN AND CLAVULANATE POTASSIUM 875; 125 MG/1; MG/1
1 TABLET, FILM COATED ORAL 2 TIMES DAILY
Qty: 10 TABLET | Refills: 0 | Status: SHIPPED | OUTPATIENT
Start: 2022-06-15 | End: 2022-06-20

## 2022-06-15 RX ORDER — AMOXICILLIN AND CLAVULANATE POTASSIUM 875; 125 MG/1; MG/1
1 TABLET, FILM COATED ORAL 2 TIMES DAILY
Qty: 10 TABLET | Refills: 0 | Status: SHIPPED | OUTPATIENT
Start: 2022-06-15 | End: 2022-06-15 | Stop reason: SDUPTHER

## 2022-06-16 LAB
LABCORP SARS-COV-2, NAA 2 DAY TAT: NORMAL
SARS-COV-2 RNA RESP QL NAA+PROBE: DETECTED

## 2022-06-21 NOTE — PROGRESS NOTES
"Chief Complaint  Cough, Nasal Congestion, Generalized Body Aches, Headache, and Fatigue    Subjective        Moises Miranda presents to [facility]  Here with 1 week of nasal congestion and cough, resolved and then with few days of body aches, congestion, headaches, fatigue; no fevers      Objective   Vital Signs:  /74   Pulse 84   Temp 98.6 °F (37 °C)   Resp 18   Ht 167.6 cm (66\")   Wt 74.4 kg (164 lb)   SpO2 94%   BMI 26.47 kg/m²   Estimated body mass index is 26.47 kg/m² as calculated from the following:    Height as of this encounter: 167.6 cm (66\").    Weight as of this encounter: 74.4 kg (164 lb).          Physical Exam  Vitals and nursing note reviewed.   Constitutional:       General: He is not in acute distress.     Appearance: Normal appearance.   HENT:      Head: Normocephalic and atraumatic.      Right Ear: External ear normal.      Left Ear: External ear normal.      Nose: Nose normal.      Mouth/Throat:      Mouth: Mucous membranes are moist.      Pharynx: Oropharynx is clear.   Eyes:      Extraocular Movements: Extraocular movements intact.      Conjunctiva/sclera: Conjunctivae normal.      Pupils: Pupils are equal, round, and reactive to light.   Cardiovascular:      Rate and Rhythm: Normal rate and regular rhythm.      Pulses: Normal pulses.      Heart sounds: Normal heart sounds. No murmur heard.    No gallop.   Pulmonary:      Effort: Pulmonary effort is normal.      Breath sounds: Normal breath sounds.   Abdominal:      General: Abdomen is flat. Bowel sounds are normal. There is no distension.      Palpations: Abdomen is soft. There is no mass.      Tenderness: There is no abdominal tenderness.   Musculoskeletal:         General: No swelling. Normal range of motion.      Cervical back: Normal range of motion and neck supple.   Skin:     General: Skin is warm and dry.      Findings: No rash.   Neurological:      General: No focal deficit " present.      Mental Status: He is alert and oriented to person, place, and time. Mental status is at baseline.   Psychiatric:         Mood and Affect: Mood normal.         Behavior: Behavior normal.        Result Review :                Assessment and Plan   Diagnoses and all orders for this visit:    1. Viral infection (Primary)  -     POCT Flu A&B, Molecular  -     COVID-19,[facility] ROUTINE, NP/OP SWAB IN TRANSPORT MEDIA OR ESWAB 72 HR TAT - Swab, Nasopharynx; Future  -     COVID-19,[facility] ROUTINE, NP/OP SWAB IN TRANSPORT MEDIA OR ESWAB 72 HR TAT - Swab, Nasopharynx    Other orders  -     Discontinue: amoxicillin-clavulanate (Augmentin) 875-125 MG per tablet; Take 1 tablet by mouth 2 (Two) Times a Day for 5 days.  Dispense: 10 tablet; Refill: 0  -     amoxicillin-clavulanate (Augmentin) 875-125 MG per tablet; Take 1 tablet by mouth 2 (Two) Times a Day for 5 days.  Dispense: 10 tablet; Refill: 0  -     SARS-CoV-2, CYNTHIA 2 DAY TAT - ,    - check covid as aboev; consider could be 2/2 bacterial sinusitis; discussed risks and benefits of augmentin  - rtc worsening, change in illness         Follow Up   No follow-ups on file.  Patient was given instructions and counseling regarding his condition or for health maintenance advice. Please see specific information pulled into the AVS if appropriate.

## 2022-06-29 ENCOUNTER — LAB (OUTPATIENT)
Dept: INTERNAL MEDICINE | Facility: CLINIC | Age: 77
End: 2022-06-29

## 2022-06-29 DIAGNOSIS — R79.89 ELEVATED SERUM CREATININE: Primary | ICD-10-CM

## 2022-06-30 LAB
BUN SERPL-MCNC: 16 MG/DL (ref 8–27)
BUN/CREAT SERPL: 15 (ref 10–24)
CALCIUM SERPL-MCNC: 8.9 MG/DL (ref 8.6–10.2)
CHLORIDE SERPL-SCNC: 104 MMOL/L (ref 96–106)
CO2 SERPL-SCNC: 26 MMOL/L (ref 20–29)
CREAT SERPL-MCNC: 1.1 MG/DL (ref 0.76–1.27)
EGFRCR SERPLBLD CKD-EPI 2021: 70 ML/MIN/1.73
GLUCOSE SERPL-MCNC: 94 MG/DL (ref 65–99)
POTASSIUM SERPL-SCNC: 5.2 MMOL/L (ref 3.5–5.2)
SODIUM SERPL-SCNC: 143 MMOL/L (ref 134–144)

## 2022-07-22 ENCOUNTER — OFFICE VISIT (OUTPATIENT)
Dept: INTERNAL MEDICINE | Facility: CLINIC | Age: 77
End: 2022-07-22

## 2022-07-22 VITALS
OXYGEN SATURATION: 99 % | HEIGHT: 66 IN | DIASTOLIC BLOOD PRESSURE: 68 MMHG | TEMPERATURE: 98.4 F | HEART RATE: 74 BPM | BODY MASS INDEX: 26.68 KG/M2 | SYSTOLIC BLOOD PRESSURE: 118 MMHG | WEIGHT: 166 LBS

## 2022-07-22 DIAGNOSIS — R22.42 KNEE MASS, LEFT: Primary | ICD-10-CM

## 2022-07-22 PROCEDURE — 99214 OFFICE O/P EST MOD 30 MIN: CPT | Performed by: INTERNAL MEDICINE

## 2022-07-25 NOTE — PROGRESS NOTES
"Chief Complaint  Mass (Bakers cyst left knee )    Subjective        Moises Miranda presents to [facility]  Left knee pain, has new swelling in the area; has not had mri to follow up u/s yet, missed these calls to schedule      Objective   Vital Signs:  /68   Pulse 74   Temp 98.4 °F (36.9 °C)   Ht 167.6 cm (66\")   Wt 75.3 kg (166 lb)   SpO2 99%   BMI 26.79 kg/m²   Estimated body mass index is 26.79 kg/m² as calculated from the following:    Height as of this encounter: 167.6 cm (66\").    Weight as of this encounter: 75.3 kg (166 lb).          Physical Exam  Constitutional:       Appearance: Normal appearance.   HENT:      Head: Normocephalic and atraumatic.      Right Ear: External ear normal.      Left Ear: External ear normal.      Nose: Nose normal.      Mouth/Throat:      Mouth: Mucous membranes are moist.   Eyes:      Extraocular Movements: Extraocular movements intact.      Conjunctiva/sclera: Conjunctivae normal.   Pulmonary:      Effort: Pulmonary effort is normal. No respiratory distress.   Musculoskeletal:         General: Normal range of motion.      Cervical back: Normal range of motion.      Comments: Left knee with lateral firm to soft moraes, now with inferomedial soft boggy swelling, no redness, no warmth   Neurological:      General: No focal deficit present.      Mental Status: He is alert. Mental status is at baseline.   Psychiatric:         Mood and Affect: Mood normal.         Behavior: Behavior normal.         Thought Content: Thought content normal.         Judgment: Judgment normal.        Result Review :                Assessment and Plan   Diagnoses and all orders for this visit:    1. Knee mass, left (Primary)  -     Ambulatory Referral to Orthopedic Surgery    - discussed with dr paul and bre RHOADES regarding his care, will get in office appt to evaluate; if bakers cyst may benefit from surgical evaluation/management " especially in light of the lateral knee mass  - continue tylenol asneeded for pain, nsaids if needed  - will need MRI  - rtc to follow up pending above         Follow Up   No follow-ups on file.  Patient was given instructions and counseling regarding his condition or for health maintenance advice. Please see specific information pulled into the AVS if appropriate.

## 2022-07-26 ENCOUNTER — OFFICE VISIT (OUTPATIENT)
Dept: ORTHOPEDIC SURGERY | Facility: CLINIC | Age: 77
End: 2022-07-26

## 2022-07-26 VITALS
HEIGHT: 60 IN | SYSTOLIC BLOOD PRESSURE: 124 MMHG | HEART RATE: 61 BPM | BODY MASS INDEX: 31.41 KG/M2 | DIASTOLIC BLOOD PRESSURE: 84 MMHG | WEIGHT: 160 LBS

## 2022-07-26 DIAGNOSIS — M79.89 MASS OF SOFT TISSUE OF KNEE: ICD-10-CM

## 2022-07-26 DIAGNOSIS — M17.12 OSTEOARTHRITIS OF LEFT KNEE, UNSPECIFIED OSTEOARTHRITIS TYPE: Primary | ICD-10-CM

## 2022-07-26 PROCEDURE — 99203 OFFICE O/P NEW LOW 30 MIN: CPT | Performed by: NURSE PRACTITIONER

## 2022-07-26 RX ORDER — SACUBITRIL AND VALSARTAN 24; 26 MG/1; MG/1
1 TABLET, FILM COATED ORAL 2 TIMES DAILY
COMMUNITY
End: 2022-09-07

## 2022-07-26 RX ORDER — METHYLPREDNISOLONE 4 MG/1
TABLET ORAL
Qty: 21 TABLET | Refills: 0 | Status: SHIPPED | OUTPATIENT
Start: 2022-07-26 | End: 2022-09-19

## 2022-07-26 RX ORDER — PREDNISONE 1 MG/1
5 TABLET ORAL DAILY
COMMUNITY

## 2022-07-26 NOTE — PROGRESS NOTES
Subjective:     Patient ID: Moises Miranda is a 77 y.o. male.    Chief Complaint:  Swelling left lower extremity, new patient to examiner  History of Present Illness  Moises Miranda 37-year-old male presents to clinic with spouse for evaluation of his left lower extremity.  The last few months began noticing swelling along the lateral joint line of the left knee x-ray images were obtained primary care office since that time is began noticing worsening of swelling to the proximal fibula denies known injury.  Does report a history of melanoma.  He also reports viral COVID infection in June.  MRI with and without contrast was ordered in June however due to infection was unable to proceed with testing.  He denies any erythema to the left knee.  He does have a history of rheumatoid arthritis currently treated with methotrexate as well and has oral prednisone.  Denies any prior MRI of the left knee in the past.  Denies any prior corticosteroid injections of the left knee.  X-ray imaging available for review in chart.  Denies significant presence of tenderness.  Denies other concerns present this time.     Social History     Occupational History   • Not on file   Tobacco Use   • Smoking status: Former Smoker   • Smokeless tobacco: Never Used   • Tobacco comment: quit 1990   Vaping Use   • Vaping Use: Never used   Substance and Sexual Activity   • Alcohol use: Yes     Alcohol/week: 3.0 standard drinks     Types: 3 Cans of beer per week   • Drug use: No   • Sexual activity: Defer      Past Medical History:   Diagnosis Date   • GERD (gastroesophageal reflux disease)    • Hyperlipidemia    • Rheumatoid arthritis (HCC)      Past Surgical History:   Procedure Laterality Date   • CARDIAC DEFIBRILLATOR PLACEMENT     • SKIN CANCER EXCISION  03/15/2013       Family History   Problem Relation Age of Onset   • Hypertension Neg Hx                Objective:  Physical Exam    Vital signs reviewed.   General: No acute distress.  Eyes:  "conjunctiva clear; pupils equally round and reactive  ENT: external ears and nose atraumatic; oropharynx clear  CV: no peripheral edema  Resp: normal respiratory effort  Skin: no rashes or wounds; normal turgor  Psych: mood and affect appropriate; recent and remote memory intact    Vitals:    07/26/22 1436   BP: 124/84   Pulse: 61   Weight: 72.6 kg (160 lb)   Height: 142.2 cm (56\")         07/26/22  1436   Weight: 72.6 kg (160 lb)     Body mass index is 35.87 kg/m².      Left Knee Exam     Tenderness   The patient is experiencing tenderness in the lateral joint line.    Range of Motion   Extension: 5   Flexion: 100     Tests   Mikal:  Medial - negative Lateral - negative  Varus: negative Valgus: negative  Lachman:  Anterior - 1+    Posterior - negative  Patellar apprehension: positive    Other   Erythema: absent  Sensation: normal  Pulse: present  Swelling: severe  Effusion: effusion present    Comments:  Positive swelling along the lateral joint line, positive swelling proximal fibula, posteriorly  Negative calf tenderness, negative Homans' sign  Positive swelling posterior joint line                 Imaging:  XR Knee 1 or 2 View Left    Result Date: 6/14/2022  1.  Soft tissue mass visible along the lateral knee joint margin as described. See above recommendations regarding follow-up MR imaging. 2.  2. Advanced tricompartment degenerative arthropathy.  This report was finalized on 6/14/2022 3:55 PM by Dr. Gagandeep Gandhi MD.    Independently reviewed 2 view x-ray images left knee previously completed available for viewing in chart advanced tricompartmental osteoarthritis with bone-on-bone articulation medial compartment and patellofemoral joint, reactive osteophytes in all 3 compartments overall varus deformity appreciated, soft tissue mass present along the lateral knee joint is recommended follow-up with MRI.    Assessment:        1. Osteoarthritis of left knee, unspecified osteoarthritis type    2. Mass of " soft tissue of knee           Plan:  1. Discussed plan of care with patient.  Discussed with patient and spouse will proceed with MRI with and without contrast to evaluate mass along the lateral aspect of the knee.  Plan plan to see him back in clinic after completion of testing discussed results and further plan of care.  Again due to the increased swelling he is experiencing we will try Medrol Dosepak to see if we can further reduce any swelling in the knee.  We will hold off on any injections till after completion of the MRI.  Continue weightbearing as tolerated.  All questions answered.  Orders:  No orders of the defined types were placed in this encounter.    New Medications Ordered This Visit   Medications   • methylPREDNISolone (MEDROL) 4 MG dose pack     Sig: Use as directed by package instructions     Dispense:  21 tablet     Refill:  0           I ordered and reviewed the VIET today.     Dragon Dictation utilized.

## 2022-08-09 ENCOUNTER — TELEPHONE (OUTPATIENT)
Dept: ORTHOPEDIC SURGERY | Facility: CLINIC | Age: 77
End: 2022-08-09

## 2022-08-09 ENCOUNTER — HOSPITAL ENCOUNTER (OUTPATIENT)
Dept: MRI IMAGING | Facility: HOSPITAL | Age: 77
End: 2022-08-09

## 2022-08-09 DIAGNOSIS — M17.12 OSTEOARTHRITIS OF LEFT KNEE, UNSPECIFIED OSTEOARTHRITIS TYPE: ICD-10-CM

## 2022-08-09 DIAGNOSIS — M79.89 MASS OF SOFT TISSUE OF KNEE: Primary | ICD-10-CM

## 2022-08-09 NOTE — TELEPHONE ENCOUNTER
Caller: PREM    Relationship to patient: WIFE    Best call back number: [phone]    Patient is needing: PATIENTS WIFE IS CALLING TO INFORM DARSHAN THAT THE MRI SHE ORDERED WAS CANCELLED DUE TO THE PATIENT HAVING A DEFIBRILLATOR. PLEASE ADVISE ON NEXT STEPS

## 2022-08-17 ENCOUNTER — HOSPITAL ENCOUNTER (OUTPATIENT)
Dept: GENERAL RADIOLOGY | Facility: HOSPITAL | Age: 77
Discharge: HOME | End: 2022-08-17

## 2022-08-17 ENCOUNTER — HOSPITAL ENCOUNTER (OUTPATIENT)
Dept: CT IMAGING | Facility: HOSPITAL | Age: 77
Discharge: HOME | End: 2022-08-17

## 2022-08-17 DIAGNOSIS — M79.89 MASS OF SOFT TISSUE OF KNEE: ICD-10-CM

## 2022-08-17 DIAGNOSIS — M17.12 OSTEOARTHRITIS OF LEFT KNEE, UNSPECIFIED OSTEOARTHRITIS TYPE: ICD-10-CM

## 2022-08-17 PROCEDURE — 73701 CT LOWER EXTREMITY W/DYE: CPT

## 2022-08-17 PROCEDURE — 0 IOPAMIDOL PER 1 ML: Performed by: NURSE PRACTITIONER

## 2022-08-17 RX ADMIN — IOPAMIDOL 100 ML: 755 INJECTION, SOLUTION INTRAVENOUS at 13:47

## 2022-08-23 DIAGNOSIS — M79.89 MASS OF SOFT TISSUE OF KNEE: Primary | ICD-10-CM

## 2022-08-23 DIAGNOSIS — M17.12 OSTEOARTHRITIS OF LEFT KNEE, UNSPECIFIED OSTEOARTHRITIS TYPE: ICD-10-CM

## 2022-09-07 ENCOUNTER — OFFICE VISIT (OUTPATIENT)
Dept: INTERNAL MEDICINE | Facility: CLINIC | Age: 77
End: 2022-09-07

## 2022-09-07 VITALS
DIASTOLIC BLOOD PRESSURE: 76 MMHG | WEIGHT: 157 LBS | HEART RATE: 60 BPM | OXYGEN SATURATION: 99 % | HEIGHT: 60 IN | SYSTOLIC BLOOD PRESSURE: 120 MMHG | TEMPERATURE: 97.7 F | RESPIRATION RATE: 18 BRPM | BODY MASS INDEX: 30.82 KG/M2

## 2022-09-07 DIAGNOSIS — E78.2 MIXED HYPERLIPIDEMIA: ICD-10-CM

## 2022-09-07 DIAGNOSIS — I10 PRIMARY HYPERTENSION: ICD-10-CM

## 2022-09-07 DIAGNOSIS — R73.03 PREDIABETES: Primary | ICD-10-CM

## 2022-09-07 DIAGNOSIS — I50.20 HFREF (HEART FAILURE WITH REDUCED EJECTION FRACTION): ICD-10-CM

## 2022-09-07 PROCEDURE — 99214 OFFICE O/P EST MOD 30 MIN: CPT | Performed by: INTERNAL MEDICINE

## 2022-09-07 RX ORDER — SACUBITRIL AND VALSARTAN 24; 26 MG/1; MG/1
1 TABLET, FILM COATED ORAL 2 TIMES DAILY
Qty: 60 TABLET | Refills: 3 | Status: SHIPPED | OUTPATIENT
Start: 2022-09-07

## 2022-09-07 RX ORDER — SPIRONOLACTONE 25 MG/1
TABLET ORAL
COMMUNITY
Start: 2022-08-27

## 2022-09-07 NOTE — PROGRESS NOTES
"Chief Complaint  knee mass (Follow up )    Subjective        Moises Miranda presents to [facility]  Left knee mass, following with ortho, recent CT with possible benign appearing cystic lesion vs malignant mass though malignancy is less likely; feels this is not improving    HTN, doing well, no chest pain, sob, headaches, vision changes, no lows; keeping home log, normal        Objective   Vital Signs:  /76   Pulse 60   Temp 97.7 °F (36.5 °C)   Resp 18   Ht 142.2 cm (56\")   Wt 71.2 kg (157 lb)   SpO2 99%   BMI 35.20 kg/m²   Estimated body mass index is 35.2 kg/m² as calculated from the following:    Height as of this encounter: 142.2 cm (56\").    Weight as of this encounter: 71.2 kg (157 lb).          Physical Exam  Vitals and nursing note reviewed.   Constitutional:       General: He is not in acute distress.     Appearance: Normal appearance.   HENT:      Head: Normocephalic and atraumatic.      Right Ear: External ear normal.      Left Ear: External ear normal.      Nose: Nose normal.      Mouth/Throat:      Mouth: Mucous membranes are moist.      Pharynx: Oropharynx is clear.   Eyes:      Extraocular Movements: Extraocular movements intact.      Conjunctiva/sclera: Conjunctivae normal.      Pupils: Pupils are equal, round, and reactive to light.   Cardiovascular:      Rate and Rhythm: Normal rate and regular rhythm.      Pulses: Normal pulses.      Heart sounds: Normal heart sounds. No murmur heard.    No gallop.   Pulmonary:      Effort: Pulmonary effort is normal.      Breath sounds: Normal breath sounds.   Abdominal:      General: Abdomen is flat. Bowel sounds are normal. There is no distension.      Palpations: Abdomen is soft. There is no mass.      Tenderness: There is no abdominal tenderness.   Musculoskeletal:         General: No swelling. Normal range of motion.      Cervical back: Normal range of motion and neck supple.   Skin:     General: " Skin is warm and dry.      Findings: No rash.   Neurological:      General: No focal deficit present.      Mental Status: He is alert and oriented to person, place, and time. Mental status is at baseline.   Psychiatric:         Mood and Affect: Mood normal.         Behavior: Behavior normal.        Result Review :                Assessment and Plan   Diagnoses and all orders for this visit:    1. Prediabetes (Primary)  -     Hemoglobin A1c    2. Primary hypertension  -     Comprehensive Metabolic Panel    3. Mixed hyperlipidemia  -     Lipid Panel With / Chol / HDL Ratio    4. HFrEF (heart failure with reduced ejection fraction) (HCC)    - htn, at goal, continue coreg, spironolactone  - hld, doing well with atorva 20mg  - preDM, limit carbs, sugars, check labs today  - hfref, s/p biv/icd, taking bb, statin, lasix intermittently, entresto, spironolactone, consider adding sglt2 med though with multiple upcoming procedures will avoid making changes that could alter elextrontlytes and complicate the picture; rtc couple of months and discuss further         Follow Up   No follow-ups on file.  Patient was given instructions and counseling regarding his condition or for health maintenance advice. Please see specific information pulled into the AVS if appropriate.

## 2022-09-08 LAB
ALBUMIN SERPL-MCNC: 4.6 G/DL (ref 3.5–5.2)
ALBUMIN/GLOB SERPL: 3.5 G/DL
ALP SERPL-CCNC: 70 U/L (ref 39–117)
ALT SERPL-CCNC: 14 U/L (ref 1–41)
AST SERPL-CCNC: 19 U/L (ref 1–40)
BILIRUB SERPL-MCNC: 1.1 MG/DL (ref 0–1.2)
BUN SERPL-MCNC: 23 MG/DL (ref 8–23)
BUN/CREAT SERPL: 21.9 (ref 7–25)
CALCIUM SERPL-MCNC: 9.3 MG/DL (ref 8.6–10.5)
CHLORIDE SERPL-SCNC: 103 MMOL/L (ref 98–107)
CHOLEST SERPL-MCNC: 160 MG/DL (ref 0–200)
CHOLEST/HDLC SERPL: 3.33 {RATIO}
CO2 SERPL-SCNC: 28 MMOL/L (ref 22–29)
CREAT SERPL-MCNC: 1.05 MG/DL (ref 0.76–1.27)
EGFRCR-CYS SERPLBLD CKD-EPI 2021: 73.6 ML/MIN/1.73
GLOBULIN SER CALC-MCNC: 1.3 GM/DL
GLUCOSE SERPL-MCNC: 93 MG/DL (ref 65–99)
HBA1C MFR BLD: 5.7 % (ref 4.8–5.6)
HDLC SERPL-MCNC: 48 MG/DL (ref 40–60)
LDLC SERPL CALC-MCNC: 87 MG/DL (ref 0–100)
POTASSIUM SERPL-SCNC: 4.3 MMOL/L (ref 3.5–5.2)
PROT SERPL-MCNC: 5.9 G/DL (ref 6–8.5)
SODIUM SERPL-SCNC: 142 MMOL/L (ref 136–145)
TRIGL SERPL-MCNC: 142 MG/DL (ref 0–150)
VLDLC SERPL CALC-MCNC: 25 MG/DL (ref 5–40)

## 2022-09-13 ENCOUNTER — HOSPITAL ENCOUNTER (OUTPATIENT)
Dept: ULTRASOUND IMAGING | Facility: HOSPITAL | Age: 77
Discharge: HOME | End: 2022-09-13
Admitting: NURSE PRACTITIONER

## 2022-09-13 DIAGNOSIS — M79.89 MASS OF SOFT TISSUE OF KNEE: ICD-10-CM

## 2022-09-13 LAB
APPEARANCE FLD: ABNORMAL
COLOR FLD: ABNORMAL
CRYSTALS FLD MICRO: NORMAL
LYMPHOCYTES NFR FLD MANUAL: 50 %
NEUTROPHILS NFR FLD MANUAL: 50 %
RBC # FLD AUTO: ABNORMAL /MM3
WBC # FLD AUTO: 986 /MM3

## 2022-09-13 PROCEDURE — 87070 CULTURE OTHR SPECIMN AEROBIC: CPT | Performed by: NURSE PRACTITIONER

## 2022-09-13 PROCEDURE — 87075 CULTR BACTERIA EXCEPT BLOOD: CPT | Performed by: NURSE PRACTITIONER

## 2022-09-13 PROCEDURE — 87015 SPECIMEN INFECT AGNT CONCNTJ: CPT | Performed by: NURSE PRACTITIONER

## 2022-09-13 PROCEDURE — 89060 EXAM SYNOVIAL FLUID CRYSTALS: CPT | Performed by: NURSE PRACTITIONER

## 2022-09-13 PROCEDURE — 89051 BODY FLUID CELL COUNT: CPT | Performed by: NURSE PRACTITIONER

## 2022-09-13 PROCEDURE — 87205 SMEAR GRAM STAIN: CPT | Performed by: NURSE PRACTITIONER

## 2022-09-13 PROCEDURE — 76942 ECHO GUIDE FOR BIOPSY: CPT

## 2022-09-13 PROCEDURE — 87102 FUNGUS ISOLATION CULTURE: CPT | Performed by: NURSE PRACTITIONER

## 2022-09-13 PROCEDURE — 0 LIDOCAINE 1 % SOLUTION: Performed by: NURSE PRACTITIONER

## 2022-09-13 RX ORDER — LIDOCAINE HYDROCHLORIDE 10 MG/ML
5 INJECTION, SOLUTION INFILTRATION; PERINEURAL ONCE
Status: COMPLETED | OUTPATIENT
Start: 2022-09-13 | End: 2022-09-13

## 2022-09-13 RX ADMIN — LIDOCAINE HYDROCHLORIDE 5 ML: 10 INJECTION, SOLUTION INFILTRATION; PERINEURAL at 11:20

## 2022-09-13 RX ADMIN — SODIUM BICARBONATE 0.5 MEQ: 0.2 INJECTION, SOLUTION INTRAVENOUS at 11:20

## 2022-09-18 LAB
BACTERIA FLD CULT: NORMAL
BACTERIA SPEC ANAEROBE CULT: NORMAL
GRAM STN SPEC: NORMAL
GRAM STN SPEC: NORMAL

## 2022-09-19 ENCOUNTER — PRE-ADMISSION TESTING (OUTPATIENT)
Dept: PREADMISSION TESTING | Facility: HOSPITAL | Age: 77
End: 2022-09-19

## 2022-09-19 VITALS
BODY MASS INDEX: 31.41 KG/M2 | HEIGHT: 60 IN | DIASTOLIC BLOOD PRESSURE: 72 MMHG | RESPIRATION RATE: 16 BRPM | SYSTOLIC BLOOD PRESSURE: 110 MMHG | OXYGEN SATURATION: 97 % | HEART RATE: 71 BPM | WEIGHT: 160 LBS

## 2022-09-19 LAB
DEPRECATED RDW RBC AUTO: 55.6 FL (ref 37–54)
ERYTHROCYTE [DISTWIDTH] IN BLOOD BY AUTOMATED COUNT: 14.4 % (ref 12.3–15.4)
HCT VFR BLD AUTO: 40.8 % (ref 37.5–51)
HGB BLD-MCNC: 13.3 G/DL (ref 13–17.7)
MCH RBC QN AUTO: 34.5 PG (ref 26.6–33)
MCHC RBC AUTO-ENTMCNC: 32.6 G/DL (ref 31.5–35.7)
MCV RBC AUTO: 106 FL (ref 79–97)
PLATELET # BLD AUTO: 157 10*3/MM3 (ref 140–450)
PMV BLD AUTO: 12.6 FL (ref 6–12)
QT INTERVAL: 494 MS
RBC # BLD AUTO: 3.85 10*6/MM3 (ref 4.14–5.8)
WBC NRBC COR # BLD: 8.19 10*3/MM3 (ref 3.4–10.8)

## 2022-09-19 PROCEDURE — 93005 ELECTROCARDIOGRAM TRACING: CPT

## 2022-09-19 PROCEDURE — 36415 COLL VENOUS BLD VENIPUNCTURE: CPT

## 2022-09-19 PROCEDURE — 93010 ELECTROCARDIOGRAM REPORT: CPT | Performed by: INTERNAL MEDICINE

## 2022-09-19 PROCEDURE — 85027 COMPLETE CBC AUTOMATED: CPT | Performed by: UROLOGY

## 2022-09-19 NOTE — DISCHARGE INSTRUCTIONS
PRE-ADMISSION TESTING INSTRUCTIONS FOR ADULTS    Take these medications the morning of surgery with a small sip of water: Omeprazole, Carvedilol      Do not take any insulin or diabetes medications the morning of surgery.      No aspirin, advil, aleve, ibuprofen, naproxen, diet pills, decongestants, or herbal/vitamins for a week prior to surgery.   Tylenol/Acetaminophen is okay to take if needed.    General Instructions:    DO NOT EAT SOLID FOOD AFTER MIDNIGHT THE NIGHT BEFORE SURGERY. No gum, mints, or hard candy after midnight the night before surgery.  You may drink clear liquids the day of surgery up until 2 hours before your arrival time.  Clear liquids are liquids you can see through. Nothing RED in color.    Plain water    Sports drinks  Sodas     Gelatin (Jell-O)  Fruit juices without pulp such as white grape juice and apple juice  Popsicles that contain no fruit or yogurt  Tea or coffee (no cream or milk added)    It is beneficial for you to have a clear drink that contains carbohydrates just before you leave your house and before your fasting time begins.  We suggest a 20 ounce bottle of Gatorade or Powerade for non-diabetic patients or a 20 ounce bottle of G2 or Powerade Zero for diabetic patients.     Patients who avoid smoking, chewing tobacco and alcohol for 4 weeks prior to surgery have a reduced risk of post-operative complications.  If at all possible, quit smoking as many days before surgery as you can.    Do not smoke, use chewing tobacco or drink alcohol the day of surgery    Bring your C-PAP/ BI-PAP machine if you use one.  Wear clean comfortable clothes and socks.  Do not wear contact lenses, lotion, deodorant, or make-up.  Bring a case for your glasses if applicable. You may brush your teeth the morning of surgery.  You may wear dentures/partials, do not put adhesive/glue on them.  Leave all other jewelry and valuables at home.      Preventing a Surgical Site Infection:    Shower the night  before and on the morning of surgery using the chlorhexidine soap you were given.  Use a clean washcloth with the soap.  Place clean sheets on your bed after showering the night before surgery. Do not use the CHG soap on your hair, face, or private areas. Wash your body gently for five (5) minutes. Do not scrub your skin.  Dry with a clean towel and dress in clean clothing.  Do not shave the surgical area for 10 days-2 weeks prior to surgery  because the razor can irritate skin and make it easier to develop an infection.  Make sure you, your family, and all healthcare providers clean their hands with soap and water or an alcohol based hand  before caring for you or your wound.      Day of surgery:    Your surgeon’s office will advise you of your arrival time for the day of surgery.    Upon arrival, a Pre-op nurse and Anesthesia provider will review your health history, obtain vital signs, and answer questions you may have.  The only belongings needed at this time will be your home medications and if applicable your C-PAP/BI-PAP machine.  If you are staying overnight your family can leave the rest of your belongings in the car and bring them to your room later.  A Pre-op nurse will start an IV and you may receive medication in preparation for surgery, including something to help you relax.  Your family will be able to see you in the Pre-op area.  While you are in surgery your family should notify the waiting room  if they leave the waiting room area and provide a contact phone number.    IF you have any questions, you can call the Pre-Admission Department at [phone] or your surgeon's office.  Notify your surgeon if  you become sick, have a fever, productive cough, or cannot be here the day of surgery    Please be aware that surgery does come with discomfort.  We want to make every effort to control your discomfort so please discuss any uncontrolled symptoms with your nurse.   Your doctor  will most likely have prescribed pain medications.      If you are going home after surgery, you will receive individualized written care instructions before being discharged.  A responsible adult (over the age of 18) must drive you to and from the hospital on the day of your surgery and stay with you for 24 hours after anesthesia.    If you are staying overnight following surgery, you will be transported to your hospital room following the recovery period.  The [facility] has all private rooms.    You may receive a survey regarding the care you received. Your feedback is very important and will be used to collect the necessary data to help us to continue to provide excellent care.     Deductibles and co-payments are collected on the day of service. Please be prepared to pay the required co-pay, deductible or deposit on the day of service as defined by your plan.

## 2022-09-27 ENCOUNTER — ANESTHESIA EVENT (OUTPATIENT)
Dept: PERIOP | Facility: HOSPITAL | Age: 77
End: 2022-09-27

## 2022-09-28 ENCOUNTER — HOSPITAL ENCOUNTER (OUTPATIENT)
Facility: HOSPITAL | Age: 77
Setting detail: HOSPITAL OUTPATIENT SURGERY
Discharge: HOME | End: 2022-09-28
Attending: UROLOGY | Admitting: UROLOGY

## 2022-09-28 ENCOUNTER — ANESTHESIA (OUTPATIENT)
Dept: PERIOP | Facility: HOSPITAL | Age: 77
End: 2022-09-28

## 2022-09-28 VITALS
SYSTOLIC BLOOD PRESSURE: 110 MMHG | OXYGEN SATURATION: 99 % | BODY MASS INDEX: 35.92 KG/M2 | DIASTOLIC BLOOD PRESSURE: 78 MMHG | HEART RATE: 60 BPM | WEIGHT: 160.2 LBS | RESPIRATION RATE: 18 BRPM | TEMPERATURE: 97.8 F

## 2022-09-28 DIAGNOSIS — N35.912 BULBOUS URETHRAL STRICTURE: Primary | ICD-10-CM

## 2022-09-28 LAB — POTASSIUM SERPL-SCNC: 3.9 MMOL/L (ref 3.5–5.2)

## 2022-09-28 PROCEDURE — C1769 GUIDE WIRE: HCPCS | Performed by: UROLOGY

## 2022-09-28 PROCEDURE — 0 CEFAZOLIN SODIUM-DEXTROSE 2-3 GM-%(50ML) RECONSTITUTED SOLUTION: Performed by: UROLOGY

## 2022-09-28 PROCEDURE — 25010000002 PROPOFOL 10 MG/ML EMULSION: Performed by: ANESTHESIOLOGY

## 2022-09-28 PROCEDURE — 25010000002 FENTANYL CITRATE (PF) 100 MCG/2ML SOLUTION: Performed by: ANESTHESIOLOGY

## 2022-09-28 PROCEDURE — 25010000002 DEXAMETHASONE PER 1 MG: Performed by: REGISTERED NURSE

## 2022-09-28 PROCEDURE — 25010000002 ONDANSETRON PER 1 MG: Performed by: REGISTERED NURSE

## 2022-09-28 PROCEDURE — 84132 ASSAY OF SERUM POTASSIUM: CPT | Performed by: REGISTERED NURSE

## 2022-09-28 PROCEDURE — 25010000002 MIDAZOLAM PER 1MG: Performed by: REGISTERED NURSE

## 2022-09-28 PROCEDURE — 25010000002 PHENYLEPHRINE 10 MG/ML SOLUTION: Performed by: ANESTHESIOLOGY

## 2022-09-28 RX ORDER — SODIUM CHLORIDE 0.9 % (FLUSH) 0.9 %
10 SYRINGE (ML) INJECTION EVERY 12 HOURS SCHEDULED
Status: DISCONTINUED | OUTPATIENT
Start: 2022-09-28 | End: 2022-09-28 | Stop reason: HOSPADM

## 2022-09-28 RX ORDER — MIDAZOLAM HYDROCHLORIDE 2 MG/2ML
0.5 INJECTION, SOLUTION INTRAMUSCULAR; INTRAVENOUS
Status: DISCONTINUED | OUTPATIENT
Start: 2022-09-28 | End: 2022-09-28 | Stop reason: HOSPADM

## 2022-09-28 RX ORDER — CEFAZOLIN SODIUM 2 G/50ML
2 SOLUTION INTRAVENOUS ONCE
Status: COMPLETED | OUTPATIENT
Start: 2022-09-28 | End: 2022-09-28

## 2022-09-28 RX ORDER — OXYCODONE HYDROCHLORIDE AND ACETAMINOPHEN 5; 325 MG/1; MG/1
1 TABLET ORAL ONCE AS NEEDED
Status: DISCONTINUED | OUTPATIENT
Start: 2022-09-28 | End: 2022-09-28 | Stop reason: HOSPADM

## 2022-09-28 RX ORDER — SODIUM CHLORIDE, SODIUM LACTATE, POTASSIUM CHLORIDE, CALCIUM CHLORIDE 600; 310; 30; 20 MG/100ML; MG/100ML; MG/100ML; MG/100ML
9 INJECTION, SOLUTION INTRAVENOUS CONTINUOUS
Status: DISCONTINUED | OUTPATIENT
Start: 2022-09-28 | End: 2022-09-28 | Stop reason: HOSPADM

## 2022-09-28 RX ORDER — MAGNESIUM HYDROXIDE 1200 MG/15ML
LIQUID ORAL AS NEEDED
Status: DISCONTINUED | OUTPATIENT
Start: 2022-09-28 | End: 2022-09-28 | Stop reason: HOSPADM

## 2022-09-28 RX ORDER — FENTANYL CITRATE 50 UG/ML
INJECTION, SOLUTION INTRAMUSCULAR; INTRAVENOUS AS NEEDED
Status: DISCONTINUED | OUTPATIENT
Start: 2022-09-28 | End: 2022-09-28 | Stop reason: SURG

## 2022-09-28 RX ORDER — LIDOCAINE HYDROCHLORIDE 10 MG/ML
0.5 INJECTION, SOLUTION EPIDURAL; INFILTRATION; INTRACAUDAL; PERINEURAL ONCE AS NEEDED
Status: DISCONTINUED | OUTPATIENT
Start: 2022-09-28 | End: 2022-09-28 | Stop reason: HOSPADM

## 2022-09-28 RX ORDER — SODIUM CHLORIDE, SODIUM LACTATE, POTASSIUM CHLORIDE, CALCIUM CHLORIDE 600; 310; 30; 20 MG/100ML; MG/100ML; MG/100ML; MG/100ML
100 INJECTION, SOLUTION INTRAVENOUS CONTINUOUS
Status: DISCONTINUED | OUTPATIENT
Start: 2022-09-28 | End: 2022-09-28 | Stop reason: HOSPADM

## 2022-09-28 RX ORDER — DEXAMETHASONE SODIUM PHOSPHATE 4 MG/ML
4 INJECTION, SOLUTION INTRA-ARTICULAR; INTRALESIONAL; INTRAMUSCULAR; INTRAVENOUS; SOFT TISSUE ONCE AS NEEDED
Status: COMPLETED | OUTPATIENT
Start: 2022-09-28 | End: 2022-09-28

## 2022-09-28 RX ORDER — SODIUM CHLORIDE 0.9 % (FLUSH) 0.9 %
10 SYRINGE (ML) INJECTION AS NEEDED
Status: DISCONTINUED | OUTPATIENT
Start: 2022-09-28 | End: 2022-09-28 | Stop reason: HOSPADM

## 2022-09-28 RX ORDER — ONDANSETRON 2 MG/ML
4 INJECTION INTRAMUSCULAR; INTRAVENOUS ONCE AS NEEDED
Status: DISCONTINUED | OUTPATIENT
Start: 2022-09-28 | End: 2022-09-28 | Stop reason: HOSPADM

## 2022-09-28 RX ORDER — PROPOFOL 10 MG/ML
VIAL (ML) INTRAVENOUS AS NEEDED
Status: DISCONTINUED | OUTPATIENT
Start: 2022-09-28 | End: 2022-09-28 | Stop reason: SURG

## 2022-09-28 RX ORDER — CEPHALEXIN 500 MG/1
500 CAPSULE ORAL 3 TIMES DAILY
Qty: 21 CAPSULE | Refills: 0 | Status: SHIPPED | OUTPATIENT
Start: 2022-09-28 | End: 2022-10-05

## 2022-09-28 RX ORDER — FAMOTIDINE 10 MG/ML
20 INJECTION, SOLUTION INTRAVENOUS
Status: COMPLETED | OUTPATIENT
Start: 2022-09-28 | End: 2022-09-28

## 2022-09-28 RX ORDER — FENTANYL CITRATE 50 UG/ML
25 INJECTION, SOLUTION INTRAMUSCULAR; INTRAVENOUS
Status: DISCONTINUED | OUTPATIENT
Start: 2022-09-28 | End: 2022-09-28 | Stop reason: HOSPADM

## 2022-09-28 RX ORDER — SODIUM CHLORIDE 9 MG/ML
40 INJECTION, SOLUTION INTRAVENOUS AS NEEDED
Status: DISCONTINUED | OUTPATIENT
Start: 2022-09-28 | End: 2022-09-28 | Stop reason: HOSPADM

## 2022-09-28 RX ORDER — ONDANSETRON 2 MG/ML
4 INJECTION INTRAMUSCULAR; INTRAVENOUS ONCE AS NEEDED
Status: COMPLETED | OUTPATIENT
Start: 2022-09-28 | End: 2022-09-28

## 2022-09-28 RX ORDER — OXYCODONE HYDROCHLORIDE AND ACETAMINOPHEN 5; 325 MG/1; MG/1
1 TABLET ORAL EVERY 6 HOURS PRN
Qty: 20 TABLET | Refills: 0 | Status: SHIPPED | OUTPATIENT
Start: 2022-09-28

## 2022-09-28 RX ORDER — PHENYLEPHRINE HYDROCHLORIDE 10 MG/ML
INJECTION INTRAVENOUS AS NEEDED
Status: DISCONTINUED | OUTPATIENT
Start: 2022-09-28 | End: 2022-09-28 | Stop reason: SURG

## 2022-09-28 RX ADMIN — PROPOFOL 20 MG: 10 INJECTION, EMULSION INTRAVENOUS at 09:10

## 2022-09-28 RX ADMIN — CEFAZOLIN SODIUM 2 G: 2 SOLUTION INTRAVENOUS at 08:52

## 2022-09-28 RX ADMIN — FAMOTIDINE 20 MG: 10 INJECTION INTRAVENOUS at 08:04

## 2022-09-28 RX ADMIN — FENTANYL CITRATE 25 MCG: 50 INJECTION, SOLUTION INTRAMUSCULAR; INTRAVENOUS at 08:53

## 2022-09-28 RX ADMIN — ONDANSETRON 4 MG: 2 INJECTION INTRAMUSCULAR; INTRAVENOUS at 08:04

## 2022-09-28 RX ADMIN — MIDAZOLAM HYDROCHLORIDE 0.5 MG: 1 INJECTION, SOLUTION INTRAMUSCULAR; INTRAVENOUS at 08:45

## 2022-09-28 RX ADMIN — DEXAMETHASONE SODIUM PHOSPHATE 4 MG: 4 INJECTION, SOLUTION INTRAMUSCULAR; INTRAVENOUS at 08:04

## 2022-09-28 RX ADMIN — PROPOFOL 20 MG: 10 INJECTION, EMULSION INTRAVENOUS at 08:57

## 2022-09-28 RX ADMIN — PHENYLEPHRINE HYDROCHLORIDE 100 MCG: 10 INJECTION INTRAVENOUS at 08:58

## 2022-09-28 RX ADMIN — PROPOFOL 70 MG: 10 INJECTION, EMULSION INTRAVENOUS at 08:55

## 2022-09-28 RX ADMIN — FENTANYL CITRATE 25 MCG: 50 INJECTION, SOLUTION INTRAMUSCULAR; INTRAVENOUS at 09:12

## 2022-09-28 RX ADMIN — SODIUM CHLORIDE, POTASSIUM CHLORIDE, SODIUM LACTATE AND CALCIUM CHLORIDE 9 ML/HR: 600; 310; 30; 20 INJECTION, SOLUTION INTRAVENOUS at 07:56

## 2022-09-28 RX ADMIN — PHENYLEPHRINE HYDROCHLORIDE 100 MCG: 10 INJECTION INTRAVENOUS at 09:03

## 2022-09-28 NOTE — ANESTHESIA POSTPROCEDURE EVALUATION
Patient: Moises Miranda    Procedure Summary     Date: 09/28/22 Room / Location: formerly [facility] OR 3 /  [facility] OR    Anesthesia Start: 0849 Anesthesia Stop: 0932    Procedure: CYSTOSCOPY WITH INTERNAL VISUAL URETHROTOMY (N/A Urethra) Diagnosis:       Bulbous urethral stricture      (Bulbar urethral stricture)    Surgeons: Jose E Miranda MD Provider: Gina Portillo MD    Anesthesia Type: general ASA Status: 3          Anesthesia Type: general    Vitals  Vitals Value Taken Time   /67 09/28/22 1005   Temp 97.8 °F (36.6 °C) 09/28/22 0932   Pulse 64 09/28/22 1008   Resp 12 09/28/22 1005   SpO2 92 % 09/28/22 1008   Vitals shown include unvalidated device data.        Post Anesthesia Care and Evaluation    Patient location during evaluation: PHASE II  Patient participation: complete - patient participated  Level of consciousness: awake and alert  Pain score: 0  Pain management: adequate    Airway patency: patent  Anesthetic complications: No anesthetic complications  PONV Status: none  Cardiovascular status: acceptable  Respiratory status: acceptable  Hydration status: acceptable

## 2022-09-28 NOTE — OP NOTE
CYSTOSCOPY URETHROTOMY VISUAL INTERNAL  Procedure Note    Moises Miranda  9/28/2022    Pre-op Diagnosis:   Bulbar urethral stricture    Post-op Diagnosis:     Post-Op Diagnosis Codes:     * Bulbous urethral stricture [N35.912]    Procedure(s):  CYSTOSCOPY WITH INTERNAL VISUAL URETHROTOMY    Surgeon(s):  Jose E Miranda MD    Anesthesia: General    Staff:   Circulator: Danyelle Yeh RN  Scrub Person: Sirisha Valentino    Estimated Blood Loss: minimal    Specimens:                Order Name Source Comment Collection Info Order Time   POTASSIUM   Collected By: Willow So RN 9/27/2022  4:01 PM         Drains: * No LDAs found *    Findings: Bulbar stricture extending into the sphincteric urethra.  Incised.  Sphincter appears to be intact but Gambino catheter placed over the wire.  Leave this until next week.  Stricture was pinpoint barely could accommodate the flexible scope in the office.    Complications: None apparent    Indications: 77-year-old gentleman with obstructive voiding symptoms bladder outlet obstruction on urodynamics and cystoscopy shows a urethral stricture.    Procedure: Patient was taken the op suite given general anesthesia.  Placed in comfortable supine position then lithotomy.  Prepped and draped in a sterile fashion.  Surgical timeout was performed.  The urethrotome was inserted.  His penile urethra was normal once we got to the bulbar urethra encountered a very tight stenotic stricture.  Using the urethrotome I cut the stricture at the 12 o'clock position and then at 5 and 7:00 positions incised and this until we could identify the lumen into the prostatic urethra.  It was extending across the sphincteric urethra.  Little concerned that I might cut through the some of the sphincteric urethra but it was inevitably because it was just so tight and scarred down but eventually got into the prostatic urethra the bladder showed severe trabeculation.  The prostatic urethra was not  severely obstructing.  A guidewire was passed and over this a 18 [place] catheter was placed with 10 cc in the balloon.  The catheter irrigated well.  He was woken and taken to recovery in stable condition.  Discussed the findings with his family.      Jose E Miranda MD     Date: 9/28/2022  Time: 09:34 EDT

## 2022-09-28 NOTE — ANESTHESIA PREPROCEDURE EVALUATION
Anesthesia Evaluation     Patient summary reviewed and Nursing notes reviewed   no history of anesthetic complications:  NPO Solid Status: > 8 hours  NPO Liquid Status: > 8 hours           Airway   Mallampati: II  TM distance: >3 FB  Neck ROM: full  No difficulty expected  Dental    (+) lower dentures and upper dentures    Pulmonary - normal exam    breath sounds clear to auscultation  (+) a smoker Former,   Sleep apnea: Snores.  Cardiovascular - normal exam  Exercise tolerance: good (4-7 METS)    ECG reviewed  Patient on routine beta blocker  Rhythm: regular  Rate: normal    (+) pacemaker (originally placed 2013. [employer].) ICD, pacemaker interrogated <1 month ago, valvular problems/murmurs MR, CHF , hyperlipidemia,     ROS comment: EKG 9-19-22:  - ABNORMAL ECG -  Atrial-ventricular dual-paced complexes  NO PRIOR TRACING AVAILABLE FOR COMPARISON    ECHO 6/28/21:  Summary:   Borderline concentric left ventricular hypertrophy. Severely dilated left ventricle. Moderate to severely depressed left ventricular systolic function. Septal motion consistent with paced rhythm or IVCD.   The ejection fraction biplane was calculated at 34%.   Mildly dilated right ventricle. Normal right ventricular systolic function.   Moderate mitral regurgitation is present.    Neuro/Psych- negative ROS  GI/Hepatic/Renal/Endo    (+) obesity,  GERD well controlled,      Musculoskeletal     Abdominal   (+) obese,    Substance History   (+) alcohol use (2-3 beers per week),      OB/GYN          Other   arthritis,    history of cancer remission                    Anesthesia Plan    ASA 3     general     intravenous induction     Anesthetic plan, risks, benefits, and alternatives have been provided, discussed and informed consent has been obtained with: patient.    Use of blood products discussed with patient  Consented to blood products.       CODE STATUS:

## 2022-09-28 NOTE — ANESTHESIA PROCEDURE NOTES
Airway  Urgency: elective    Date/Time: 9/28/2022 8:57 AM  Airway not difficult    General Information and Staff    Anesthesiologist: Gina Portillo MD    Indications and Patient Condition  Indications for airway management: airway protection    Preoxygenated: yes  MILS maintained throughout  Mask difficulty assessment: 1 - vent by mask    Final Airway Details  Final airway type: supraglottic airway      Successful airway: unique  Size 4    Number of attempts at approach: 1  Assessment: lips, teeth, and gum same as pre-op and atraumatic intubation

## 2022-09-28 NOTE — H&P
[facility] Surgical History and Physical    Patient Care Team:  Pedrito Staples MD as PCP - General (Internal Medicine)    Chief complaint weak stream    Subjective     Patient is a 77 y.o. male presents with bladder outlet obstruction.  Urodynamics consistent with outlet obstruction.  Cystoscopy shows a bulbar stricture.  He has lateral lobe enlargement of his prostate.  He now presents for urethrotomy..     Review of Systems   Pertinent items are noted in HPI    Past Medical History:   Diagnosis Date   • Cancer (HCC)     melanoma   • CHF (congestive heart failure) (HCC)    • GERD (gastroesophageal reflux disease)    • Hyperlipidemia    • Rheumatoid arthritis (HCC)      Past Surgical History:   Procedure Laterality Date   • CARDIAC DEFIBRILLATOR PLACEMENT     • SKIN CANCER EXCISION  03/15/2013     Family History   Problem Relation Age of Onset   • Hypertension Neg Hx      Social History     Tobacco Use   • Smoking status: Former Smoker   • Smokeless tobacco: Never Used   • Tobacco comment: quit 1990   Vaping Use   • Vaping Use: Never used   Substance Use Topics   • Alcohol use: Yes     Alcohol/week: 3.0 standard drinks     Types: 3 Cans of beer per week     Comment: few beers a week   • Drug use: No       Meds:  Medications Prior to Admission   Medication Sig Dispense Refill Last Dose   • alendronate (FOSAMAX) 70 MG tablet Take 70 mg by mouth every 7 days.   9/27/2022 at Unknown time   • atorvastatin (LIPITOR) 20 MG tablet Take 1 tablet by mouth.   9/27/2022 at Unknown time   • carvedilol (COREG) 12.5 MG tablet TAKE 1/2 TABLET TWICE DAILY WITH MEALS   9/28/2022 at 0600   • folic acid (FOLVITE) 1 MG tablet Take 1 mg by mouth 3 (three) times a day.   9/27/2022 at Unknown time   • furosemide (LASIX) 40 MG tablet 1/2 tab PO q daily   9/27/2022 at Unknown time   • omeprazole (priLOSEC) 20 MG capsule TAKE 1 CAPSULE DAILY 30 MINUTES BEFORE MORNING MEAL 90 capsule 2 9/28/2022 at 0600   • Pediatric  Multivitamins-Fl (MULTI VIT/FL PO) Take  by mouth.   Past Week at Unknown time   • predniSONE (DELTASONE) 5 MG tablet Take 5 mg by mouth Daily.   9/27/2022 at Unknown time   • sacubitril-valsartan (Entresto) 24-26 MG tablet Take 1 tablet by mouth 2 (Two) Times a Day. 60 tablet 3 9/27/2022 at Unknown time   • spironolactone (ALDACTONE) 25 MG tablet    9/27/2022 at Unknown time   • tamsulosin (FLOMAX) 0.4 MG capsule 24 hr capsule Take 1 capsule by mouth Daily.   9/27/2022 at Unknown time   • methotrexate (RHEUMATREX) 2.5 MG tablet Take 2.5 mg by mouth every 12 hours for 3 doses only each week.   9/23/2022       Allergies:  Meloxicam    Debilities:  None    Objective     Vital Signs  Temp:  [97.5 °F (36.4 °C)] 97.5 °F (36.4 °C)  Heart Rate:  [65] 65  Resp:  [23] 23  BP: (114)/(84) 114/84  No intake or output data in the 24 hours ending 09/28/22 0802  Flowsheet Rows    Flowsheet Row First Filed Value   Admission Height --   Admission Weight 72.7 kg (160 lb 3.2 oz) Documented at 09/28/2022 0700           Physical Exam:     General Appearance:    Alert, cooperative, NAD   HEENT:    No trauma, pupils reactive, hearing intact   Back:     No CVA tenderness   Lungs:     Respirations unlabored, no wheezing    Heart:    RRR, intact peripheral pulses   Abdomen:     Soft, NDNT, no masses, no guarding   :   Penis normal testes normal   Extremities:   No edema, no deformity   Lymphatic:   No neck or groin LAD   Skin:   No bleeding, bruising or rashes   Neuro/Psych:   Orientation intact, mood/affect pleasant, no focal findings     Results Review:    I reviewed the patient's new clinical results.  Results for orders placed or performed in visit on 09/19/22   CBC (No Diff)    Specimen: Blood   Result Value Ref Range    WBC 8.19 3.40 - 10.80 10*3/mm3    RBC 3.85 (L) 4.14 - 5.80 10*6/mm3    Hemoglobin 13.3 13.0 - 17.7 g/dL    Hematocrit 40.8 37.5 - 51.0 %    .0 (H) 79.0 - 97.0 fL    MCH 34.5 (H) 26.6 - 33.0 pg    MCHC 32.6 31.5  - 35.7 g/dL    RDW 14.4 12.3 - 15.4 %    RDW-SD 55.6 (H) 37.0 - 54.0 fl    MPV 12.6 (H) 6.0 - 12.0 fL    Platelets 157 140 - 450 10*3/mm3   ECG 12 Lead   Result Value Ref Range    QT Interval 494 ms        Assessment:  Urethral stricture    Plan:    Cystoscopy with visual urethrotomy    I discussed the patient's findings and my recommendations with patient.   Risks, complications, outcomes and alternatives discussed with the patient at the bedside and office.    Jose E Miranda MD  09/28/22  08:02 EDT

## 2022-10-01 ENCOUNTER — HOSPITAL ENCOUNTER (EMERGENCY)
Facility: HOSPITAL | Age: 77
Discharge: HOME | End: 2022-10-01
Attending: EMERGENCY MEDICINE | Admitting: EMERGENCY MEDICINE

## 2022-10-01 VITALS
RESPIRATION RATE: 16 BRPM | OXYGEN SATURATION: 95 % | HEIGHT: 66 IN | SYSTOLIC BLOOD PRESSURE: 89 MMHG | WEIGHT: 160.1 LBS | BODY MASS INDEX: 25.73 KG/M2 | TEMPERATURE: 98.1 F | HEART RATE: 56 BPM | DIASTOLIC BLOOD PRESSURE: 65 MMHG

## 2022-10-01 DIAGNOSIS — R31.0 GROSS HEMATURIA: Primary | ICD-10-CM

## 2022-10-01 LAB
BACTERIA UR QL AUTO: ABNORMAL /HPF
BILIRUB UR QL STRIP: NEGATIVE
CLARITY UR: ABNORMAL
COLOR UR: ABNORMAL
GLUCOSE UR STRIP-MCNC: NEGATIVE MG/DL
HGB UR QL STRIP.AUTO: ABNORMAL
HYALINE CASTS UR QL AUTO: ABNORMAL /LPF
KETONES UR QL STRIP: NEGATIVE
LEUKOCYTE ESTERASE UR QL STRIP.AUTO: ABNORMAL
NITRITE UR QL STRIP: NEGATIVE
PH UR STRIP.AUTO: 5.5 [PH] (ref 4.5–8)
PROT UR QL STRIP: NEGATIVE
RBC # UR STRIP: ABNORMAL /HPF
REF LAB TEST METHOD: ABNORMAL
SP GR UR STRIP: 1.01 (ref 1–1.03)
SQUAMOUS #/AREA URNS HPF: ABNORMAL /HPF
UROBILINOGEN UR QL STRIP: ABNORMAL
WBC # UR STRIP: ABNORMAL /HPF

## 2022-10-01 PROCEDURE — 81001 URINALYSIS AUTO W/SCOPE: CPT | Performed by: EMERGENCY MEDICINE

## 2022-10-01 PROCEDURE — P9612 CATHETERIZE FOR URINE SPEC: HCPCS

## 2022-10-01 PROCEDURE — 99283 EMERGENCY DEPT VISIT LOW MDM: CPT

## 2022-10-01 NOTE — ED TRIAGE NOTES
Pt via PV from home with c/o blood with clots in urinary catheter x2 hours. Pt reports s/p prostate surgery on Wednesday where they also placed urinary catheter.   Pt denies any blood thinners.     All triage performed with this RN wearing appropriate PPE.  Pt placed in mask upon arrival to ED.

## 2022-10-01 NOTE — DISCHARGE INSTRUCTIONS
Blood in the urine as not unexpected after a urologic procedure.  Continue current medications.  If symptoms worsen and urine becomes so bloody that you could not see through it this would be an indication to return to the ED.  Call Dr. Miranda's office Monday morning.  Follow-up per their recommendations.  Return to ED for worsening symptoms, medical emergencies.

## 2022-10-01 NOTE — ED PROVIDER NOTES
"Subjective   History of Present Illness  Moises Miranda is a 77-year-old white male who present secondary to hematuria.  On 9/28/2022 patient underwent cystoscopy performed by Dr. Jose E Miranda for bullous urethral stricture.  The stricture was incised to provide relief.  Gambino catheter was placed intraoperatively.  Patient postoperative course had been unremarkable until today.  The patient noticed blood clots followed by blood-tinged urine.  He called Dr. Miranda office but \"got the run around\" thus patient elected to come to the ED for evaluation.  Patient is otherwise asymptomatic.  Wife is at bedside    History provided by:  Patient and spouse      Review of Systems   Constitutional: Negative for fever.   HENT: Negative for rhinorrhea.    Eyes: Negative for redness.   Respiratory: Negative for cough.    Cardiovascular: Negative for chest pain.   Gastrointestinal: Negative for abdominal pain.   Genitourinary: Positive for hematuria. Negative for dysuria.   Musculoskeletal: Negative for back pain.   Skin: Negative for rash.   Neurological: Negative for syncope.   All other systems reviewed and are negative.      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   • Cancer (HCC)     melanoma   • CHF (congestive heart failure) (HCC)    • GERD (gastroesophageal reflux disease)    • Hyperlipidemia    • Rheumatoid arthritis (HCC)        Allergies   Allergen Reactions   • Meloxicam Other (See Comments)     Doesn't remember the reaction        Past Surgical History:   Procedure Laterality Date   • CARDIAC DEFIBRILLATOR PLACEMENT     • CYSTOSCOPY URETHROTOMY VISUAL INTERNAL N/A 9/28/2022    Procedure: CYSTOSCOPY WITH INTERNAL VISUAL URETHROTOMY;  Surgeon: Jose E Miranda MD;  Location: [facility];  Service: Urology;  Laterality: N/A;   • SKIN CANCER EXCISION  03/15/2013       Family History   Problem Relation Age of Onset   • Hypertension Neg Hx        Social History     Socioeconomic " History   • Marital status:    Tobacco Use   • Smoking status: Former Smoker   • Smokeless tobacco: Never Used   • Tobacco comment: quit 1990   Vaping Use   • Vaping Use: Never used   Substance and Sexual Activity   • Alcohol use: Yes     Alcohol/week: 3.0 standard drinks     Types: 3 Cans of beer per week     Comment: few beers a week   • Drug use: No   • Sexual activity: Defer           Objective   Physical Exam  Vitals and nursing note reviewed.   Constitutional:       General: He is not in acute distress.     Appearance: Normal appearance. He is well-developed. He is not ill-appearing, toxic-appearing or diaphoretic.      Comments: 77-year-old white male laying in bed.  Patient appears in good health for age.  Vital signs unremarkable.  Mr. Miranda is friendly and cooperative.  His wife is at bedside.   HENT:      Head: Normocephalic and atraumatic.      Right Ear: Tympanic membrane, ear canal and external ear normal.      Left Ear: Tympanic membrane, ear canal and external ear normal.      Nose: Nose normal.      Mouth/Throat:      Mouth: Mucous membranes are moist.      Pharynx: Oropharynx is clear.   Eyes:      Extraocular Movements: Extraocular movements intact.      Conjunctiva/sclera: Conjunctivae normal.      Pupils: Pupils are equal, round, and reactive to light.   Cardiovascular:      Rate and Rhythm: Normal rate and regular rhythm.      Heart sounds: Normal heart sounds. No murmur heard.    No friction rub. No gallop.   Pulmonary:      Effort: Pulmonary effort is normal. No respiratory distress.      Breath sounds: Normal breath sounds. No stridor. No wheezing or rales.   Abdominal:      General: There is no distension.      Palpations: Abdomen is soft. There is no mass.      Tenderness: There is no abdominal tenderness. There is no guarding or rebound.      Hernia: No hernia is present.   Genitourinary:     Penis: Normal.       Testes: Normal.   Musculoskeletal:         General: Normal range of  motion.      Cervical back: Normal range of motion and neck supple.   Skin:     General: Skin is warm and dry.      Findings: No erythema or rash.   Neurological:      General: No focal deficit present.      Mental Status: He is alert and oriented to person, place, and time.      Cranial Nerves: No cranial nerve deficit.      Deep Tendon Reflexes: Reflexes are normal and symmetric.   Psychiatric:         Mood and Affect: Mood normal.         Behavior: Behavior normal.         Procedures           ED Course  ED Course as of 10/01/22 1708   Sat Oct 01, 2022   1548 Patient has gross hematuria.  Obtaining UA.  Will check that Gambino is draining properly [SS]   1704 Blood, UA(!): Large (3+) [SS]   1705 Leukocytes, UA(!): Small (1+) [SS]   1705 Nitrite, UA: Negative [SS]   1705 Bacteria, UA: None Seen [SS]   1705 WBC, UA(!): 3-5 [SS]   1705 RBC, UA(!): 31-50 [SS]   1705 UA shows large blood with 31-50 RBCs, 3-5 WBCs, no bacteria.  Leukocyte small but nitrite negative.  No convincing evidence for UTI.  Patient taking Keflex.  I do not feel further antibiotics needed.  Catheter drained well.  Urine cleared with irrigation.  Since his BP a bit low.  However he is asymptomatic.  Wife reports patient blood pressure always a bit low.  Will DC home. [SS]      ED Course User Index  [SS] Kirby Underwood MD      Labs Reviewed   URINALYSIS W/ MICROSCOPIC IF INDICATED (NO CULTURE) - Abnormal; Notable for the following components:       Result Value    Color, UA Kehsia (*)     Appearance, UA Slightly Cloudy (*)     Blood, UA Large (3+) (*)     Leuk Esterase, UA Small (1+) (*)     All other components within normal limits   URINALYSIS, MICROSCOPIC ONLY - Abnormal; Notable for the following components:    RBC, UA 31-50 (*)     WBC, UA 3-5 (*)     All other components within normal limits       My differential diagnosis for dysuria includes but is not limited to urinary tract infection, hematuria, excessive caffeine intake, excessive  soft drink intake, urinary retention, kidney stones, bladder stones and interstitial cystitis.  Complete                                     MDM    Final diagnoses:   Gross hematuria       ED Disposition  ED Disposition     ED Disposition   Discharge    Condition   Stable    Comment   --             Jose E Miranda MD  [address] [address] [phone]    Call   Persing Monday morning.  Inform office staff of hematuria and ER visit.  Follow-up per the recommendation         Medication List      No changes were made to your prescriptions during this visit.          Kirby Underwood MD  10/01/22 [id number]

## 2022-10-03 ENCOUNTER — DOCUMENTATION (OUTPATIENT)
Dept: INTERNAL MEDICINE | Facility: CLINIC | Age: 77
End: 2022-10-03

## 2022-10-06 ENCOUNTER — OFFICE VISIT (OUTPATIENT)
Dept: INTERNAL MEDICINE | Facility: CLINIC | Age: 77
End: 2022-10-06

## 2022-10-06 VITALS
BODY MASS INDEX: 25.71 KG/M2 | HEART RATE: 84 BPM | TEMPERATURE: 97.9 F | DIASTOLIC BLOOD PRESSURE: 70 MMHG | HEIGHT: 66 IN | SYSTOLIC BLOOD PRESSURE: 112 MMHG | OXYGEN SATURATION: 98 % | RESPIRATION RATE: 18 BRPM | WEIGHT: 160 LBS

## 2022-10-06 DIAGNOSIS — R82.90 UNSPECIFIED ABNORMAL FINDINGS IN URINE: ICD-10-CM

## 2022-10-06 DIAGNOSIS — R19.7 DIARRHEA, UNSPECIFIED TYPE: Primary | ICD-10-CM

## 2022-10-06 PROCEDURE — 99214 OFFICE O/P EST MOD 30 MIN: CPT | Performed by: INTERNAL MEDICINE

## 2022-10-06 RX ORDER — METHOTREXATE 2.5 MG/1
TABLET ORAL
COMMUNITY
Start: 2022-10-05 | End: 2022-10-06

## 2022-10-06 NOTE — PROGRESS NOTES
"Chief Complaint  Diarrhea and Fatigue    Subjective        Moises Miranda presents to [facility]  History of Present Illness  Here with complaints of diarrhea and fatigue.     He went to a  home a week or so ago and another person there tested positive for covid. He then started to develop aches and fatigue, took a home test initially negative then a repeat was positive. He had some nausea/vomiting a few days in which has resolved. Now he is having at least 6 episodes of watery diarrhea and continued fatigue. Recently completed a course of keflex. Also recently in ER for hematuria, went to urology on Monday and shields was removed. No urinary symptoms now that he reports but he did have decreased urination this morning. Reports some abdominal distension. No fevers or chills, no cough, no shortness of breath or chest pain.      Objective   Vital Signs:  /70   Pulse 84   Temp 97.9 °F (36.6 °C)   Resp 18   Ht 167.6 cm (66\")   Wt 72.6 kg (160 lb)   SpO2 98%   BMI 25.82 kg/m²   Estimated body mass index is 25.82 kg/m² as calculated from the following:    Height as of this encounter: 167.6 cm (66\").    Weight as of this encounter: 72.6 kg (160 lb).    Physical Exam  Vitals and nursing note reviewed.   Constitutional:       General: He is not in acute distress.     Appearance: Normal appearance. He is not toxic-appearing.   HENT:      Head: Normocephalic and atraumatic.      Right Ear: Tympanic membrane, ear canal and external ear normal.      Left Ear: Tympanic membrane, ear canal and external ear normal.      Nose: Nose normal. No congestion or rhinorrhea.      Mouth/Throat:      Mouth: Mucous membranes are moist.      Pharynx: No oropharyngeal exudate.   Eyes:      General: No scleral icterus.        Right eye: No discharge.         Left eye: No discharge.      Extraocular Movements: Extraocular movements intact.      Conjunctiva/sclera: Conjunctivae " normal.      Pupils: Pupils are equal, round, and reactive to light.   Cardiovascular:      Rate and Rhythm: Normal rate and regular rhythm.      Pulses: Normal pulses.      Heart sounds: Normal heart sounds. No murmur heard.  Pulmonary:      Effort: Pulmonary effort is normal. No respiratory distress.      Breath sounds: Normal breath sounds. No wheezing.   Abdominal:      General: Abdomen is flat. Bowel sounds are normal. There is distension.      Palpations: Abdomen is soft.      Tenderness: There is no abdominal tenderness. There is no guarding.   Musculoskeletal:         General: No swelling, tenderness or deformity. Normal range of motion.      Cervical back: Normal range of motion and neck supple. No rigidity or tenderness.   Lymphadenopathy:      Cervical: No cervical adenopathy.   Skin:     General: Skin is warm.      Capillary Refill: Capillary refill takes less than 2 seconds.   Neurological:      General: No focal deficit present.      Mental Status: He is alert and oriented to person, place, and time. Mental status is at baseline.      Cranial Nerves: No cranial nerve deficit.      Gait: Gait normal.   Psychiatric:         Mood and Affect: Mood normal.         Behavior: Behavior normal.         Thought Content: Thought content normal.         Judgment: Judgment normal.        Result Review :  The following data was reviewed by: Keegan Segura MD on 10/06/2022:  Common labs    Common Labs 9/7/22 9/7/22 9/7/22 9/19/22 9/28/22    [id number]     Glucose 93       BUN 23       Creatinine 1.05       Sodium 142       Potassium 4.3    3.9   Chloride 103       Calcium 9.3       Total Protein 5.9 (A)       Albumin 4.60       Total Bilirubin 1.1       Alkaline Phosphatase 70       AST (SGOT) 19       ALT (SGPT) 14       WBC    8.19    Hemoglobin    13.3    Hematocrit    40.8    Platelets    157    Total Cholesterol  160      Triglycerides  142      HDL Cholesterol  48      LDL Cholesterol   87      Hemoglobin A1C    5.70 (A)     (A) Abnormal value       Comments are available for some flowsheets but are not being displayed.           Data reviewed: ER note reviewed, prior office visit reviewed          Assessment and Plan      Moises Miranda is a 77 y.o. male with multiple medical comorbidities who presents with diarrhea and fatigue, home covid test was positive a few days ago and sounds like he was given paxlovid but unclear if he took. Now with diarrhea and fatigue, he did recently complete a course of keflex, diarrhea is very watery and frequent with some abdominal distension, will go ahead and check Cdiff, xray to eval for ileus post viral infection. Repeat urine studies now with culture. I think the fatigue is likely post covid given he is only 10 days in and should get better. Plan to re-eval +/- labs if not improving. Clinically well appearing and suspect a lot of this is related to covid infection but has a lot of complicating comorbidities.    Diagnoses and all orders for this visit:    1. Diarrhea, unspecified type (Primary)  -     POCT urinalysis dipstick, automated  -     Urine Culture - Urine, Urine, Clean Catch  -     Clostridioides difficile Toxin, PCR - Stool, Per Rectum  -     XR Abdomen KUB    2. Unspecified abnormal findings in urine   -     Urine Culture - Urine, Urine, Clean Catch      I spent 40 minutes caring for Moises on this date of service. This time includes time spent by me in the following activities:preparing for the visit, reviewing tests, obtaining and/or reviewing a separately obtained history, performing a medically appropriate examination and/or evaluation , counseling and educating the patient/family/caregiver, ordering medications, tests, or procedures and documenting information in the medical record  Follow Up   No follow-ups on file.  Patient was given instructions and counseling regarding his condition or for health maintenance advice. Please see specific information pulled into the AVS  if appropriate.

## 2022-10-07 ENCOUNTER — HOSPITAL ENCOUNTER (OUTPATIENT)
Dept: GENERAL RADIOLOGY | Facility: HOSPITAL | Age: 77
Discharge: HOME | End: 2022-10-07
Admitting: INTERNAL MEDICINE

## 2022-10-07 LAB — C DIFF TOX GENS STL QL NAA+PROBE: NEGATIVE

## 2022-10-07 PROCEDURE — 74018 RADEX ABDOMEN 1 VIEW: CPT

## 2022-10-10 LAB
BACTERIA UR CULT: ABNORMAL
BACTERIA UR CULT: ABNORMAL
OTHER ANTIBIOTIC SUSC ISLT: ABNORMAL

## 2022-10-11 DIAGNOSIS — N30.00 ACUTE CYSTITIS WITHOUT HEMATURIA: Primary | ICD-10-CM

## 2022-10-11 LAB — FUNGUS WND CULT: NORMAL

## 2022-10-11 RX ORDER — CIPROFLOXACIN 500 MG/1
500 TABLET, FILM COATED ORAL 2 TIMES DAILY
Qty: 14 TABLET | Refills: 0 | Status: SHIPPED | OUTPATIENT
Start: 2022-10-11 | End: 2022-10-18

## 2022-11-03 ENCOUNTER — TRANSCRIBE ORDERS (OUTPATIENT)
Dept: ADMINISTRATIVE | Facility: HOSPITAL | Age: 77
End: 2022-11-03

## 2022-11-03 DIAGNOSIS — M47.816 LUMBAR SPONDYLOSIS: Primary | ICD-10-CM

## 2022-11-04 RX ORDER — OMEPRAZOLE 20 MG/1
CAPSULE, DELAYED RELEASE ORAL
Qty: 90 CAPSULE | Refills: 2 | Status: SHIPPED | OUTPATIENT
Start: 2022-11-04

## 2022-11-04 NOTE — TELEPHONE ENCOUNTER
Rx Refill Note  Requested Prescriptions     Pending Prescriptions Disp Refills   • omeprazole (priLOSEC) 20 MG capsule [Pharmacy Med Name: OMEPRAZOLE 20 MG Capsule Delayed Release] 90 capsule 2     Sig: TAKE 1 CAPSULE DAILY 30 MINUTES BEFORE MORNING MEAL      Last office visit with prescribing clinician: 9/7/2022      Next office visit with prescribing clinician: 11/7/2022            Noe Santoyo MA  11/04/22, 09:20 EDT

## 2022-11-07 ENCOUNTER — OFFICE VISIT (OUTPATIENT)
Dept: INTERNAL MEDICINE | Facility: CLINIC | Age: 77
End: 2022-11-07

## 2022-11-07 VITALS
WEIGHT: 158 LBS | HEIGHT: 66 IN | RESPIRATION RATE: 16 BRPM | SYSTOLIC BLOOD PRESSURE: 116 MMHG | DIASTOLIC BLOOD PRESSURE: 64 MMHG | TEMPERATURE: 98.4 F | OXYGEN SATURATION: 96 % | HEART RATE: 67 BPM | BODY MASS INDEX: 25.39 KG/M2

## 2022-11-07 DIAGNOSIS — I10 PRIMARY HYPERTENSION: ICD-10-CM

## 2022-11-07 DIAGNOSIS — E78.2 MIXED HYPERLIPIDEMIA: ICD-10-CM

## 2022-11-07 DIAGNOSIS — N30.00 ACUTE CYSTITIS WITHOUT HEMATURIA: Primary | ICD-10-CM

## 2022-11-07 DIAGNOSIS — R73.03 PREDIABETES: ICD-10-CM

## 2022-11-07 LAB
BILIRUB BLD-MCNC: NEGATIVE MG/DL
CLARITY, POC: CLEAR
COLOR UR: YELLOW
EXPIRATION DATE: NORMAL
GLUCOSE UR STRIP-MCNC: NEGATIVE MG/DL
KETONES UR QL: NEGATIVE
LEUKOCYTE EST, POC: NEGATIVE
Lab: NORMAL
NITRITE UR-MCNC: NEGATIVE MG/ML
PH UR: 7 [PH] (ref 5–8)
PROT UR STRIP-MCNC: NEGATIVE MG/DL
RBC # UR STRIP: NEGATIVE /UL
SP GR UR: 1.02 (ref 1–1.03)
UROBILINOGEN UR QL: NORMAL

## 2022-11-07 PROCEDURE — 99214 OFFICE O/P EST MOD 30 MIN: CPT | Performed by: INTERNAL MEDICINE

## 2022-11-07 PROCEDURE — 81003 URINALYSIS AUTO W/O SCOPE: CPT | Performed by: INTERNAL MEDICINE

## 2022-11-07 NOTE — PROGRESS NOTES
"Chief Complaint  Prediabetes (Follow up )    Subjective        Moises Miranda presents to [facility]  History of Present Illness  HLD, HTN, doing well, no chest pain, sob, headaches, vision changes, no lows; keeping home log, normal    PreDM, limiting carbs    Recent visit to cardiology for HFrEF, no chagnes made to biv icd      Objective   Vital Signs:  /64   Pulse 67   Temp 98.4 °F (36.9 °C)   Resp 16   Ht 167.6 cm (66\")   Wt 71.7 kg (158 lb)   SpO2 96%   BMI 25.50 kg/m²   Estimated body mass index is 25.5 kg/m² as calculated from the following:    Height as of this encounter: 167.6 cm (66\").    Weight as of this encounter: 71.7 kg (158 lb).          Physical Exam  Vitals and nursing note reviewed.   Constitutional:       General: He is not in acute distress.     Appearance: Normal appearance.   HENT:      Head: Normocephalic and atraumatic.      Right Ear: External ear normal.      Left Ear: External ear normal.      Nose: Nose normal.      Mouth/Throat:      Mouth: Mucous membranes are moist.      Pharynx: Oropharynx is clear.   Eyes:      Extraocular Movements: Extraocular movements intact.      Conjunctiva/sclera: Conjunctivae normal.      Pupils: Pupils are equal, round, and reactive to light.   Cardiovascular:      Rate and Rhythm: Normal rate and regular rhythm.      Pulses: Normal pulses.      Heart sounds: Normal heart sounds. No murmur heard.    No gallop.   Pulmonary:      Effort: Pulmonary effort is normal.      Breath sounds: Normal breath sounds.   Abdominal:      General: Abdomen is flat. Bowel sounds are normal. There is no distension.      Palpations: Abdomen is soft. There is no mass.      Tenderness: There is no abdominal tenderness.   Musculoskeletal:         General: No swelling. Normal range of motion.      Cervical back: Normal range of motion and neck supple.   Skin:     General: Skin is warm and dry.      Findings: No rash. "   Neurological:      General: No focal deficit present.      Mental Status: He is alert and oriented to person, place, and time. Mental status is at baseline.   Psychiatric:         Mood and Affect: Mood normal.         Behavior: Behavior normal.        Result Review :                Assessment and Plan   Diagnoses and all orders for this visit:    1. Acute cystitis without hematuria (Primary)  -     POCT urinalysis dipstick, automated  -     Urine Culture - Urine, Urine, Clean Catch    2. Prediabetes  -     Hemoglobin A1c    3. Primary hypertension  -     Comprehensive Metabolic Panel    4. Mixed hyperlipidemia  -     Comprehensive Metabolic Panel  -     Lipid Panel With / Chol / HDL Ratio    - check labs as above, will adjust meds to goal  - hfref doing well, is active, hunting, fishing, caring for his ill wife  - will get ortho communication to discuss next steps for leg mass, noted ganglion cyst on recent imaging, does cause him some discomfort at times  - noted recent uro issues; will get urine to follow up previous today         Follow Up   No follow-ups on file.  Patient was given instructions and counseling regarding his condition or for health maintenance advice. Please see specific information pulled into the AVS if appropriate.

## 2022-11-08 ENCOUNTER — TELEPHONE (OUTPATIENT)
Dept: INTERNAL MEDICINE | Facility: CLINIC | Age: 77
End: 2022-11-08

## 2022-11-08 NOTE — TELEPHONE ENCOUNTER
Pt's phone numbers were incorrect. They have since been updated and are now correct. Please try calling patient again when available, please and thank you!

## 2022-11-08 NOTE — TELEPHONE ENCOUNTER
Tried to call patient with both numbers listed in chart. Was unable to reach him due to numbers not working. Was unable to leave a .     HUB PLEASE ADVISE of message below from the provider:    U/a is normal, we ll follow the culture    Thanks  Agueda Mendoza MA

## 2022-11-09 LAB
ALBUMIN SERPL-MCNC: 4.2 G/DL (ref 3.5–5.2)
ALBUMIN/GLOB SERPL: 2.2 G/DL
ALP SERPL-CCNC: 87 U/L (ref 39–117)
ALT SERPL-CCNC: 22 U/L (ref 1–41)
AST SERPL-CCNC: 30 U/L (ref 1–40)
BACTERIA UR CULT: NORMAL
BACTERIA UR CULT: NORMAL
BILIRUB SERPL-MCNC: 1.2 MG/DL (ref 0–1.2)
BUN SERPL-MCNC: 17 MG/DL (ref 8–23)
BUN/CREAT SERPL: 15.5 (ref 7–25)
CALCIUM SERPL-MCNC: 9.3 MG/DL (ref 8.6–10.5)
CHLORIDE SERPL-SCNC: 102 MMOL/L (ref 98–107)
CHOLEST SERPL-MCNC: 167 MG/DL (ref 0–200)
CHOLEST/HDLC SERPL: 3.55 {RATIO}
CO2 SERPL-SCNC: 27.5 MMOL/L (ref 22–29)
CREAT SERPL-MCNC: 1.1 MG/DL (ref 0.76–1.27)
EGFRCR SERPLBLD CKD-EPI 2021: 69.6 ML/MIN/1.73
GLOBULIN SER CALC-MCNC: 1.9 GM/DL
GLUCOSE SERPL-MCNC: 110 MG/DL (ref 65–99)
HBA1C MFR BLD: 6 % (ref 4.8–5.6)
HDLC SERPL-MCNC: 47 MG/DL (ref 40–60)
LDLC SERPL CALC-MCNC: 90 MG/DL (ref 0–100)
POTASSIUM SERPL-SCNC: 4.6 MMOL/L (ref 3.5–5.2)
PROT SERPL-MCNC: 6.1 G/DL (ref 6–8.5)
SODIUM SERPL-SCNC: 140 MMOL/L (ref 136–145)
TRIGL SERPL-MCNC: 174 MG/DL (ref 0–150)
VLDLC SERPL CALC-MCNC: 30 MG/DL (ref 5–40)

## 2022-11-09 NOTE — TELEPHONE ENCOUNTER
Caller: PREM GRIFFIN    Relationship: Self    Best call back number: [phone]    HUB TRIED TO WARM TRANSFER BUT WAS UNSUCCESSFUL. PLEASE CALL BACK.

## 2022-11-10 ENCOUNTER — TELEPHONE (OUTPATIENT)
Dept: INTERNAL MEDICINE | Facility: CLINIC | Age: 77
End: 2022-11-10

## 2022-11-10 NOTE — TELEPHONE ENCOUNTER
Called patient. No answer; could not leave a VM for patient due to VM box being full and not accepting new VMs.     HUB PLEASE ADVISE of message below from the provider      1. preDM, limit carbs, sugars  2. HLD, limit fried foods, fatty foods  3. Follow up 1 month or so    Thanks  Agueda Mendoza MA

## 2022-11-14 ENCOUNTER — HOSPITAL ENCOUNTER (OUTPATIENT)
Dept: CT IMAGING | Facility: HOSPITAL | Age: 77
Discharge: HOME | End: 2022-11-14
Admitting: PHYSICIAN ASSISTANT

## 2022-11-14 ENCOUNTER — OFFICE VISIT (OUTPATIENT)
Dept: ORTHOPEDIC SURGERY | Facility: CLINIC | Age: 77
End: 2022-11-14

## 2022-11-14 DIAGNOSIS — M47.816 LUMBAR SPONDYLOSIS: ICD-10-CM

## 2022-11-14 DIAGNOSIS — M17.12 OSTEOARTHRITIS OF LEFT KNEE, UNSPECIFIED OSTEOARTHRITIS TYPE: Primary | ICD-10-CM

## 2022-11-14 DIAGNOSIS — M79.89 MASS OF SOFT TISSUE OF KNEE: ICD-10-CM

## 2022-11-14 PROCEDURE — 72131 CT LUMBAR SPINE W/O DYE: CPT

## 2022-11-14 PROCEDURE — 20610 DRAIN/INJ JOINT/BURSA W/O US: CPT | Performed by: NURSE PRACTITIONER

## 2022-11-14 PROCEDURE — 99213 OFFICE O/P EST LOW 20 MIN: CPT | Performed by: NURSE PRACTITIONER

## 2022-11-14 RX ORDER — TRIAMCINOLONE ACETONIDE 40 MG/ML
80 INJECTION, SUSPENSION INTRA-ARTICULAR; INTRAMUSCULAR
Status: COMPLETED | OUTPATIENT
Start: 2022-11-14 | End: 2022-11-14

## 2022-11-14 RX ORDER — LIDOCAINE HYDROCHLORIDE 10 MG/ML
8 INJECTION, SOLUTION EPIDURAL; INFILTRATION; INTRACAUDAL; PERINEURAL
Status: COMPLETED | OUTPATIENT
Start: 2022-11-14 | End: 2022-11-14

## 2022-11-14 RX ORDER — ASPIRIN 81 MG/1
1 TABLET ORAL EVERY 24 HOURS
COMMUNITY

## 2022-11-14 RX ADMIN — TRIAMCINOLONE ACETONIDE 80 MG: 40 INJECTION, SUSPENSION INTRA-ARTICULAR; INTRAMUSCULAR at 15:01

## 2022-11-14 RX ADMIN — LIDOCAINE HYDROCHLORIDE 8 ML: 10 INJECTION, SOLUTION EPIDURAL; INFILTRATION; INTRACAUDAL; PERINEURAL at 15:01

## 2022-11-14 NOTE — PROGRESS NOTES
Subjective:     Patient ID: Moises Miranda is a 77 y.o. male.    Chief Complaint:  Follow-up DJD left knee  History of Present Illness  Moises Miranda returns to clinic for follow-up of his left knee.  Aspiration cyst just lateral to the joint line completed under ultrasound.  He presents today with continued pain and swelling still present along the lateral joint line but otherwise is noted significant symptom improvement.  Denies of the knee is locking catching or giving way.  Denies any other concerns present.    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   • Cancer (HCC)     melanoma   • CHF (congestive heart failure) (HCC)    • GERD (gastroesophageal reflux disease)    • Hyperlipidemia    • Rheumatoid arthritis (HCC)      Past Surgical History:   Procedure Laterality Date   • CARDIAC DEFIBRILLATOR PLACEMENT     • CYSTOSCOPY URETHROTOMY VISUAL INTERNAL N/A 9/28/2022    Procedure: CYSTOSCOPY WITH INTERNAL VISUAL URETHROTOMY;  Surgeon: Jose E Miranda MD;  Location: [facility];  Service: Urology;  Laterality: N/A;   • SKIN CANCER EXCISION  03/15/2013       Family History   Problem Relation Age of Onset   • Hypertension Neg Hx                Objective:  Physical Exam    Vital signs reviewed.   General: No acute distress.  Eyes: conjunctiva clear; pupils equally round and reactive  ENT: external ears and nose atraumatic; oropharynx clear  CV: no peripheral edema  Resp: normal respiratory effort  Skin: no rashes or wounds; normal turgor  Psych: mood and affect appropriate; recent  and remote memory intact    There were no vitals filed for this visit.  There were no vitals filed for this visit.  There is no height or weight on file to calculate BMI.      Left Knee Exam     Tenderness   The patient is experiencing tenderness in the patella, lateral joint line and medial joint line.    Range of Motion   Extension: 0   Left knee flexion: 120.     Tests   Varus: negative Valgus: negative  Lachman:  Anterior - 1+      Patellar apprehension: positive    Other   Erythema: absent  Sensation: normal  Pulse: present  Swelling: moderate  Effusion: no effusion present    Comments:  Positive crepitus throughout arc of motion  Positive active patellar compression test              Assessment:        1. Osteoarthritis of left knee, unspecified osteoarthritis type    2. Mass of soft tissue of knee           Plan:  1.  Discussed plan of care with patient.  Discussed treatment options including corticosteroid injection anterior lateral approach which she does wish to proceed with.  Plan to see him back in clinic in 4 weeks to reevaluate.  Encouraged application of ice at injection site this evening, weightbearing as tolerated.  Encouraged to call with any questions concerns that he has between now and follow-up.  All questions answered.    Large Joint Arthrocentesis: L knee  Date/Time: 11/14/2022 3:01 PM  Consent given by: patient  Site marked: site marked  Timeout: Immediately prior to procedure a time out was called to verify the correct patient, procedure, equipment, support staff and site/side marked as required   Supporting Documentation  Indications: pain and joint swelling   Procedure Details  Location: knee - L knee  Preparation: Patient was prepped and draped in the usual sterile fashion  Needle size: 22 G  Approach: anterolateral  Medications administered: 80 mg triamcinolone acetonide 40 MG/ML; 8 mL lidocaine PF 1% 1 %  Patient tolerance: patient tolerated the procedure well with no immediate  complications          Orders:  Orders Placed This Encounter   Procedures   • Large Joint Arthrocentesis: L knee     No orders of the defined types were placed in this encounter.      Dragon Dictation utilized.

## 2022-12-12 ENCOUNTER — OFFICE VISIT (OUTPATIENT)
Dept: ORTHOPEDIC SURGERY | Facility: CLINIC | Age: 77
End: 2022-12-12

## 2022-12-12 VITALS — HEIGHT: 66 IN | WEIGHT: 158 LBS | BODY MASS INDEX: 25.39 KG/M2

## 2022-12-12 DIAGNOSIS — M17.12 OSTEOARTHRITIS OF LEFT KNEE, UNSPECIFIED OSTEOARTHRITIS TYPE: Primary | ICD-10-CM

## 2022-12-12 PROCEDURE — 99213 OFFICE O/P EST LOW 20 MIN: CPT | Performed by: NURSE PRACTITIONER

## 2022-12-12 NOTE — PROGRESS NOTES
Subjective:     Patient ID: Moises Miranda is a 77 y.o. male.    Chief Complaint:  Follow-up DJD left knee  History of Present Illness  Moises Miranda returns to clinic for follow-up of his left knee. He received corticosteroid injection last visit with significant symptom relief. He has noted improvement in regards to range of motion with extension and flexion of the knee, decreased swelling, and increased activity tolerance. He denies that the knee is locking, catching, or giving way. He denies any other concerns present.    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   • Cancer (HCC)     melanoma   • CHF (congestive heart failure) (HCC)    • GERD (gastroesophageal reflux disease)    • Hyperlipidemia    • Rheumatoid arthritis (HCC)      Past Surgical History:   Procedure Laterality Date   • CARDIAC DEFIBRILLATOR PLACEMENT     • CYSTOSCOPY URETHROTOMY VISUAL INTERNAL N/A 9/28/2022    Procedure: CYSTOSCOPY WITH INTERNAL VISUAL URETHROTOMY;  Surgeon: Jose E Miranda MD;  Location: [facility];  Service: Urology;  Laterality: N/A;   • SKIN CANCER EXCISION  03/15/2013       Family History   Problem Relation Age of Onset   • Hypertension Neg Hx                Objective:  Physical Exam    General: No acute distress.  Eyes: conjunctiva clear; pupils equally round and reactive  ENT: external ears and nose atraumatic; oropharynx clear  CV: no peripheral edema  Resp: normal respiratory effort  Skin: no rashes or wounds; normal turgor  Psych: mood and affect appropriate; recent and remote  "memory intact    Vitals:    12/12/22 1401   Weight: 71.7 kg (158 lb)   Height: 167.6 cm (66\")         12/12/22  1401   Weight: 71.7 kg (158 lb)     Body mass index is 25.5 kg/m².      Ortho Exam     Left Knee Exam      Tenderness   Denies presence of tenderness     Range of Motion   Extension: 0   Left knee flexion: 120     Tests   Varus: negative Valgus: negative  Lachman:  Anterior - 1+      Patellar apprehension: positive     Other   Erythema: absent  Sensation: normal  Pulse: present  Swelling: moderate, posteriorly  Effusion: no effusion present     Comments:    Positive crepitus throughout arc of motion  Positive active patellar compression test    Assessment:        1. Osteoarthritis of left knee, unspecified osteoarthritis type           Plan:  1. 1. Discussed plan of care with patient.  2. 2. Discussed repeating corticosteroid injection if needed in 2 months, which will be 3 months from original injection.  3. 3. We will plan to follow up in 2 months. Discussed with him to cancel if he is not experiencing any discomfort. We will gladly see him back in the clinic as needed.  4. 4. He verbalized understanding of all information and agreed with the plan of care. He denies any other concerns at present.    Orders:  No orders of the defined types were placed in this encounter.    No orders of the defined types were placed in this encounter.      Transcribed from ambient dictation for JF Pinto by Nicole Webb.  12/12/22   18:34 EST    Patient or patient representative verbalized consent to the visit recording.  I have personally performed the services described in this document as transcribed by the above individual, and it is both accurate and complete.      "

## 2023-01-06 ENCOUNTER — OFFICE VISIT (OUTPATIENT)
Dept: INTERNAL MEDICINE | Facility: CLINIC | Age: 78
End: 2023-01-06
Payer: MEDICARE

## 2023-01-06 VITALS
RESPIRATION RATE: 18 BRPM | DIASTOLIC BLOOD PRESSURE: 68 MMHG | WEIGHT: 156 LBS | TEMPERATURE: 98 F | SYSTOLIC BLOOD PRESSURE: 124 MMHG | BODY MASS INDEX: 25.07 KG/M2 | HEART RATE: 64 BPM | HEIGHT: 66 IN | OXYGEN SATURATION: 98 %

## 2023-01-06 DIAGNOSIS — B37.0 THRUSH: Primary | ICD-10-CM

## 2023-01-06 PROCEDURE — 99213 OFFICE O/P EST LOW 20 MIN: CPT | Performed by: INTERNAL MEDICINE

## 2023-01-06 RX ORDER — METHOTREXATE 2.5 MG/1
TABLET ORAL
COMMUNITY
Start: 2022-12-14

## 2023-01-06 RX ORDER — CLOTRIMAZOLE 10 MG/1
10 LOZENGE ORAL; TOPICAL
Qty: 50 TABLET | Refills: 0 | Status: SHIPPED | OUTPATIENT
Start: 2023-01-06 | End: 2023-01-16

## 2023-01-06 NOTE — PROGRESS NOTES
Chief Complaint  Thrush    Subjective        Moises Miranda presents to [facility]  History of Present Illness  Here with several days of film in mouth, white; has had a lot of stress lately, wife ill      Objective   Vital Signs:  /68   Pulse 64   Temp 98 °F (36.7 °C)   Resp 18   Ht 167.6 cm (66\")   Wt 70.8 kg (156 lb)   SpO2 98%   BMI 25.18 kg/m²   Estimated body mass index is 25.18 kg/m² as calculated from the following:    Height as of this encounter: 167.6 cm (66\").    Weight as of this encounter: 70.8 kg (156 lb).          Physical Exam  Constitutional:       Appearance: Normal appearance.   HENT:      Head: Normocephalic and atraumatic.      Comments: Mouth with white patches on tongue, buccal mucosa     Right Ear: External ear normal.      Left Ear: External ear normal.      Nose: Nose normal.      Mouth/Throat:      Mouth: Mucous membranes are moist.   Eyes:      Extraocular Movements: Extraocular movements intact.      Conjunctiva/sclera: Conjunctivae normal.   Pulmonary:      Effort: Pulmonary effort is normal. No respiratory distress.   Musculoskeletal:         General: Normal range of motion.      Cervical back: Normal range of motion.   Neurological:      General: No focal deficit present.      Mental Status: He is alert. Mental status is at baseline.   Psychiatric:         Mood and Affect: Mood normal.         Behavior: Behavior normal.         Thought Content: Thought content normal.         Judgment: Judgment normal.        Result Review :                Assessment and Plan   Diagnoses and all orders for this visit:    1. Thrush (Primary)    Other orders  -     clotrimazole (MYCELEX) 10 MG brooklynn; Take 1 tablet by mouth 5 (Five) Times a Day for 10 days.  Dispense: 50 tablet; Refill: 0    - start clotrimazole  - rtc to follow up  - discussed risks/benefits/alternatives of meds/treatments           Follow Up   No follow-ups on file.  Patient  was given instructions and counseling regarding his condition or for health maintenance advice. Please see specific information pulled into the AVS if appropriate.

## 2023-01-19 ENCOUNTER — TELEPHONE (OUTPATIENT)
Dept: INTERNAL MEDICINE | Facility: CLINIC | Age: 78
End: 2023-01-19
Payer: MEDICARE

## 2023-01-19 NOTE — TELEPHONE ENCOUNTER
Called patient to inform him that Dr. Staples will be out of the office tomorrow and to reschedule. No answer and voicemail was full.      Hub to read

## 2023-01-23 ENCOUNTER — OFFICE VISIT (OUTPATIENT)
Dept: INTERNAL MEDICINE | Facility: CLINIC | Age: 78
End: 2023-01-23
Payer: MEDICARE

## 2023-01-23 VITALS
HEART RATE: 61 BPM | RESPIRATION RATE: 16 BRPM | DIASTOLIC BLOOD PRESSURE: 78 MMHG | HEIGHT: 66 IN | BODY MASS INDEX: 25.17 KG/M2 | WEIGHT: 156.6 LBS | OXYGEN SATURATION: 99 % | TEMPERATURE: 98 F | SYSTOLIC BLOOD PRESSURE: 126 MMHG

## 2023-01-23 DIAGNOSIS — B37.0 ORAL THRUSH: Primary | ICD-10-CM

## 2023-01-23 PROCEDURE — 99213 OFFICE O/P EST LOW 20 MIN: CPT | Performed by: INTERNAL MEDICINE

## 2023-01-23 NOTE — PROGRESS NOTES
"Chief Complaint  Thrush (Follow up /)    Subjective        Moises Miranda presents to [facility]  History of Present Illness  Here for follow-up visit for thrush, HTN, HLD, preDM, HFrEF.     At last appointment he was started on clotrimazole for oral thrush. Since then pt states his thrush resolved. Has been doing well otherwise. Notes he has a sore from his dentures and is scheduling an appointment to have them refitted. Washed his dentures daily in approved solution.      Objective   Vital Signs:  /78   Pulse 61   Temp 98 °F (36.7 °C)   Resp 16   Ht 167.6 cm (66\")   Wt 71 kg (156 lb 9.6 oz)   SpO2 99%   BMI 25.28 kg/m²   Estimated body mass index is 25.28 kg/m² as calculated from the following:    Height as of this encounter: 167.6 cm (66\").    Weight as of this encounter: 71 kg (156 lb 9.6 oz).             Physical Exam  Vitals and nursing note reviewed.   Constitutional:       General: He is not in acute distress.     Appearance: Normal appearance.   HENT:      Head: Normocephalic and atraumatic.      Nose: Nose normal.      Mouth/Throat:      Mouth: Mucous membranes are moist.      Pharynx: Oropharynx is clear. No oropharyngeal exudate or posterior oropharyngeal erythema.   Eyes:      Extraocular Movements: Extraocular movements intact.      Conjunctiva/sclera: Conjunctivae normal.      Pupils: Pupils are equal, round, and reactive to light.   Cardiovascular:      Rate and Rhythm: Normal rate and regular rhythm.      Pulses: Normal pulses.      Heart sounds: Normal heart sounds. No murmur heard.    No gallop.   Pulmonary:      Effort: Pulmonary effort is normal.      Breath sounds: Normal breath sounds.   Abdominal:      General: Abdomen is flat. Bowel sounds are normal. There is no distension.      Palpations: Abdomen is soft. There is no mass.      Tenderness: There is no abdominal tenderness.   Musculoskeletal:         General: No swelling. Normal " range of motion.      Cervical back: Normal range of motion and neck supple.   Skin:     General: Skin is warm and dry.      Findings: No rash.   Neurological:      General: No focal deficit present.      Mental Status: He is alert and oriented to person, place, and time. Mental status is at baseline.   Psychiatric:         Mood and Affect: Mood normal.         Behavior: Behavior normal.        Result Review :  The following data was reviewed by: Rohith Bills, Medical Student on 01/23/2023:  Common labs    Common Labs 9/19/22 9/28/22 11/7/22 11/7/22 11/7/22      0839 [id number] 0839   Glucose   110 (A)     BUN   17     Creatinine   1.10     Sodium   140     Potassium  3.9 4.6     Chloride   102     Calcium   9.3     Total Protein   6.1     Albumin   4.20     Total Bilirubin   1.2     Alkaline Phosphatase   87     AST (SGOT)   30     ALT (SGPT)   22     WBC 8.19       Hemoglobin 13.3       Hematocrit 40.8       Platelets 157       Total Cholesterol    167    Triglycerides    174 (A)    HDL Cholesterol    47    LDL Cholesterol     90    Hemoglobin A1C     6.00 (A)   (A) Abnormal value       Comments are available for some flowsheets but are not being displayed.                         Assessment and Plan   Diagnoses and all orders for this visit:    1. Oral thrush (Primary)  Comments:  - Successfully treated oral thrush  - RTC for next regularly scheduled follow-up (due in 1 month)             Follow Up   No follow-ups on file.  Patient was given instructions and counseling regarding his condition or for health maintenance advice. Please see specific information pulled into the AVS if appropriate.

## 2023-02-09 ENCOUNTER — OFFICE VISIT (OUTPATIENT)
Dept: INTERNAL MEDICINE | Facility: CLINIC | Age: 78
End: 2023-02-09
Payer: MEDICARE

## 2023-02-09 VITALS
BODY MASS INDEX: 25.39 KG/M2 | RESPIRATION RATE: 18 BRPM | HEART RATE: 80 BPM | OXYGEN SATURATION: 97 % | WEIGHT: 158 LBS | DIASTOLIC BLOOD PRESSURE: 70 MMHG | SYSTOLIC BLOOD PRESSURE: 124 MMHG | TEMPERATURE: 97.8 F | HEIGHT: 66 IN

## 2023-02-09 DIAGNOSIS — B37.0 ORAL THRUSH: Primary | ICD-10-CM

## 2023-02-09 PROCEDURE — 99214 OFFICE O/P EST MOD 30 MIN: CPT | Performed by: INTERNAL MEDICINE

## 2023-02-09 RX ORDER — FLUCONAZOLE 200 MG/1
200 TABLET ORAL DAILY
Qty: 14 TABLET | Refills: 0 | Status: SHIPPED | OUTPATIENT
Start: 2023-02-09 | End: 2023-02-23

## 2023-02-09 NOTE — PROGRESS NOTES
"Chief Complaint  Thrush    Subjective        Moises Miranda presents to [facility]  History of Present Illness  Here with return of thrush, mouth white patches, lips; resolved with previous clotrimazole treatment      Objective   Vital Signs:  /70   Pulse 80   Temp 97.8 °F (36.6 °C)   Resp 18   Ht 167.6 cm (66\")   Wt 71.7 kg (158 lb)   SpO2 97%   BMI 25.50 kg/m²   Estimated body mass index is 25.5 kg/m² as calculated from the following:    Height as of this encounter: 167.6 cm (66\").    Weight as of this encounter: 71.7 kg (158 lb).             Physical Exam  Constitutional:       Appearance: Normal appearance.   HENT:      Head: Normocephalic and atraumatic.      Right Ear: External ear normal.      Left Ear: External ear normal.      Nose: Nose normal.      Mouth/Throat:      Mouth: Mucous membranes are moist.      Comments: White patches buccal mucosa  Eyes:      Extraocular Movements: Extraocular movements intact.      Conjunctiva/sclera: Conjunctivae normal.   Pulmonary:      Effort: Pulmonary effort is normal. No respiratory distress.   Musculoskeletal:         General: Normal range of motion.      Cervical back: Normal range of motion.   Neurological:      General: No focal deficit present.      Mental Status: He is alert. Mental status is at baseline.   Psychiatric:         Mood and Affect: Mood normal.         Behavior: Behavior normal.         Thought Content: Thought content normal.         Judgment: Judgment normal.        Result Review :                   Assessment and Plan   Diagnoses and all orders for this visit:    1. Oral thrush (Primary)    Other orders  -     fluconazole (DIFLUCAN) 200 MG tablet; Take 1 tablet by mouth Daily for 14 days.  Dispense: 14 tablet; Refill: 0    - start oral fluconazole, counseled on risk factors, immune suppression  - will have rtc to follow up pending above therapy; may need to adjust immunosuprression or " other management pending above         Follow Up   Return in about 4 weeks (around 3/9/2023) for Recheck.  Patient was given instructions and counseling regarding his condition or for health maintenance advice. Please see specific information pulled into the AVS if appropriate.

## 2023-03-08 ENCOUNTER — TELEPHONE (OUTPATIENT)
Dept: INTERNAL MEDICINE | Facility: CLINIC | Age: 78
End: 2023-03-08
Payer: MEDICARE

## 2023-03-08 NOTE — TELEPHONE ENCOUNTER
Okay for hub to read**  Called to change appointment for 03/09/2023 to virtual appointment due to power outtage. Needed to leave message to call back to reschedule, cancel or change to virtual appointment.

## 2023-03-20 ENCOUNTER — OFFICE VISIT (OUTPATIENT)
Dept: INTERNAL MEDICINE | Facility: CLINIC | Age: 78
End: 2023-03-20
Payer: MEDICARE

## 2023-03-20 VITALS
TEMPERATURE: 98.4 F | BODY MASS INDEX: 25.71 KG/M2 | HEART RATE: 78 BPM | OXYGEN SATURATION: 96 % | RESPIRATION RATE: 18 BRPM | WEIGHT: 160 LBS | HEIGHT: 66 IN | DIASTOLIC BLOOD PRESSURE: 82 MMHG | SYSTOLIC BLOOD PRESSURE: 120 MMHG

## 2023-03-20 DIAGNOSIS — R53.1 WEAKNESS: Primary | ICD-10-CM

## 2023-03-20 NOTE — PROGRESS NOTES
"Chief Complaint  Thrush (Follow up ), Extremity Weakness (Legs since injections in back #3), and Earache (Left ear )    Subjective        Moises Miranda presents to [facility]  History of Present Illness    Patient here for f/u on thrush. Has improved. Otherwise, states that he has new onset lower back pain and bilat leg weakness. Started about 1mo ago after 3rd back injection. Was getting back injections with pain management. First two injections help. He has a hx of prostate cancer and says he was treated for that in the past. Also reports some incontinence.    Objective   Vital Signs:  /82   Pulse 78   Temp 98.4 °F (36.9 °C)   Resp 18   Ht 167.6 cm (66\")   Wt 72.6 kg (160 lb)   SpO2 96%   BMI 25.82 kg/m²   Estimated body mass index is 25.82 kg/m² as calculated from the following:    Height as of this encounter: 167.6 cm (66\").    Weight as of this encounter: 72.6 kg (160 lb).             Physical Exam  Constitutional:       General: He is not in acute distress.     Appearance: Normal appearance.   HENT:      Head: Normocephalic and atraumatic.      Right Ear: External ear normal.      Left Ear: External ear normal.      Nose: Nose normal. No congestion.      Mouth/Throat:      Mouth: Mucous membranes are moist.      Pharynx: Oropharynx is clear.   Eyes:      Extraocular Movements: Extraocular movements intact.      Conjunctiva/sclera: Conjunctivae normal.      Pupils: Pupils are equal, round, and reactive to light.   Cardiovascular:      Rate and Rhythm: Normal rate and regular rhythm.   Pulmonary:      Effort: Pulmonary effort is normal. No respiratory distress.      Breath sounds: Normal breath sounds.   Abdominal:      General: Abdomen is flat.      Palpations: Abdomen is soft.      Tenderness: There is no abdominal tenderness.   Musculoskeletal:         General: No swelling or tenderness. Normal range of motion.      Cervical back: Normal range of " motion and neck supple.      Comments: Weakness with ambulation,    Skin:     General: Skin is warm and dry.      Capillary Refill: Capillary refill takes less than 2 seconds.   Neurological:      General: No focal deficit present.      Mental Status: He is alert and oriented to person, place, and time. Mental status is at baseline.      Cranial Nerves: No cranial nerve deficit.   Psychiatric:         Mood and Affect: Mood normal.         Behavior: Behavior normal.        Result Review :                   Assessment and Plan   Diagnoses and all orders for this visit:    1. Weakness (Primary)  -     MRI Lumbar Spine Without Contrast    Bilat LE weakness, started after 3rd shot. Concerning for trauma related injury. Also given hx of remote prostate cancer, MRI would assist with eval of related issues from that. Will ordered urgently 2/2 symptoms. Patient declined eval for rectal tone but has been having some incontinence which is concerning as well. Will get MRI and address depending on results.     Patient also mentioned that an NP told him that he has a hx of AAA. Upon chart review, theres imaging from 2018 4.2 cm AAA. Will need to get updated Ultrasound for surveillance.    Otherwise, thrush has resolved.          Follow Up   No follow-ups on file.  Patient was given instructions and counseling regarding his condition or for health maintenance advice. Please see specific information pulled into the AVS if appropriate.

## 2023-03-21 ENCOUNTER — TELEPHONE (OUTPATIENT)
Dept: INTERNAL MEDICINE | Facility: CLINIC | Age: 78
End: 2023-03-21

## 2023-03-21 NOTE — TELEPHONE ENCOUNTER
Caller: Prem Miranda    Relationship: Emergency Contact    Best call back number:[phone]    What is the best time to reach you: ANY     Who are you requesting to speak with (clinical staff, provider,  specific staff member): CLINICAL STAFF     What was the call regarding: PATIENT'S WIFE PREM IS CALLING IN,  PATIENT WAS SCHEDULED FOR A MRI LAST NIGHT 03/20.  THEY WERE UNABLE TO DO THE MRI DUE TO PATIENT HAS A DEFIBRILLATOR THEY HAVE IT RESCHEDULE FOR April 6.     Do you require a callback: YES

## 2023-03-26 DIAGNOSIS — I71.43 INFRARENAL ABDOMINAL AORTIC ANEURYSM (AAA) WITHOUT RUPTURE: Primary | ICD-10-CM

## 2023-04-06 ENCOUNTER — HOSPITAL ENCOUNTER (OUTPATIENT)
Dept: MRI IMAGING | Facility: HOSPITAL | Age: 78
Discharge: HOME | End: 2023-04-06
Admitting: INTERNAL MEDICINE
Payer: MEDICARE

## 2023-04-06 ENCOUNTER — TELEPHONE (OUTPATIENT)
Dept: INTERNAL MEDICINE | Facility: CLINIC | Age: 78
End: 2023-04-06
Payer: MEDICARE

## 2023-04-06 VITALS
HEART RATE: 74 BPM | DIASTOLIC BLOOD PRESSURE: 70 MMHG | RESPIRATION RATE: 18 BRPM | SYSTOLIC BLOOD PRESSURE: 108 MMHG | OXYGEN SATURATION: 96 %

## 2023-04-06 DIAGNOSIS — R53.1 WEAKNESS: Primary | ICD-10-CM

## 2023-04-06 DIAGNOSIS — M48.00 SPINAL STENOSIS, UNSPECIFIED SPINAL REGION: ICD-10-CM

## 2023-04-06 PROCEDURE — 72148 MRI LUMBAR SPINE W/O DYE: CPT

## 2023-04-06 NOTE — TELEPHONE ENCOUNTER
Caller: KALINA    Relationship: RADIOLOGY ROOM [facility]    Best call back number:     What is the best time to reach you:     Who are you requesting to speak with (clinical staff, provider,  specific staff member):     Do you know the name of the person who called:     What was the call regarding: KALINA WITH [facility] IS CALLING IN TO REQUEST A CALL BACK FROM DR HARDEN OFFICE TO SPEAK TO DR YODER OR STAFF.     Do you require a callback: YES

## 2023-04-19 DIAGNOSIS — Z87.891 PERSONAL HISTORY OF TOBACCO USE, PRESENTING HAZARDS TO HEALTH: Primary | ICD-10-CM

## 2023-04-20 ENCOUNTER — HOSPITAL ENCOUNTER (OUTPATIENT)
Dept: ULTRASOUND IMAGING | Facility: HOSPITAL | Age: 78
Discharge: HOME | End: 2023-04-20
Admitting: INTERNAL MEDICINE
Payer: MEDICARE

## 2023-04-20 DIAGNOSIS — I71.43 INFRARENAL ABDOMINAL AORTIC ANEURYSM (AAA) WITHOUT RUPTURE: ICD-10-CM

## 2023-04-20 PROCEDURE — 76706 US ABDL AORTA SCREEN AAA: CPT

## 2023-04-21 ENCOUNTER — TELEPHONE (OUTPATIENT)
Dept: INTERNAL MEDICINE | Facility: CLINIC | Age: 78
End: 2023-04-21

## 2023-04-21 NOTE — TELEPHONE ENCOUNTER
Hub staff attempted to follow warm transfer process and was unsuccessful     Caller: Enriqueta Miranda    Relationship to patient: Emergency Contact    Best call back number: [phone] (Mobile)    Patient is needing: PATIENT'S WIFE STATES THEY JUST MISSED A CALL FROM DR YODER'S NURSE BUT [facility] WAS UNABLE TO WARM TRANSFER THE CALL BACK TO THE OFFICE BECAUSE THE OFFICE IS CLOSED, PLEASE CALL THE PATIENT'S WIFE BACK ASAP

## 2023-04-28 NOTE — TELEPHONE ENCOUNTER
Hub staff attempted to follow warm transfer process and was unsuccessful     Caller: Enriqueta Miranda    Relationship to patient: Emergency Contact    Best call back number: [phone]    Patient is needing: TO DISCUSS THE RESULTS OF PATIENT'S MRI AND ULTRASOUND.     PLEASE CALL TO ADVISE

## 2023-05-08 ENCOUNTER — OFFICE VISIT (OUTPATIENT)
Dept: INTERNAL MEDICINE | Facility: CLINIC | Age: 78
End: 2023-05-08
Payer: MEDICARE

## 2023-05-08 VITALS
SYSTOLIC BLOOD PRESSURE: 114 MMHG | RESPIRATION RATE: 18 BRPM | DIASTOLIC BLOOD PRESSURE: 62 MMHG | HEART RATE: 66 BPM | BODY MASS INDEX: 25.71 KG/M2 | HEIGHT: 66 IN | TEMPERATURE: 98.4 F | OXYGEN SATURATION: 98 % | WEIGHT: 160 LBS

## 2023-05-08 DIAGNOSIS — M48.00 SPINAL STENOSIS, UNSPECIFIED SPINAL REGION: ICD-10-CM

## 2023-05-08 DIAGNOSIS — I71.40 ABDOMINAL AORTIC ANEURYSM (AAA) WITHOUT RUPTURE, UNSPECIFIED PART: Primary | ICD-10-CM

## 2023-05-15 NOTE — PROGRESS NOTES
"Chief Complaint  Back Pain and Hip Pain (Left side )    Subjective        Moises Miranda presents to [facility]  History of Present Illness  Here with low back pain; radiates into left hip, leg; recent MRI reviewed in detail today; also reviewed recent AAA u/s with him today; remains without symptoms      Objective   Vital Signs:  /62   Pulse 66   Temp 98.4 °F (36.9 °C)   Resp 18   Ht 167.6 cm (66\")   Wt 72.6 kg (160 lb)   SpO2 98%   BMI 25.82 kg/m²   Estimated body mass index is 25.82 kg/m² as calculated from the following:    Height as of this encounter: 167.6 cm (66\").    Weight as of this encounter: 72.6 kg (160 lb).             Physical Exam  Constitutional:       Appearance: Normal appearance.   HENT:      Head: Normocephalic and atraumatic.      Right Ear: External ear normal.      Left Ear: External ear normal.      Nose: Nose normal.      Mouth/Throat:      Mouth: Mucous membranes are moist.   Eyes:      Extraocular Movements: Extraocular movements intact.      Conjunctiva/sclera: Conjunctivae normal.   Pulmonary:      Effort: Pulmonary effort is normal. No respiratory distress.   Musculoskeletal:         General: Normal range of motion.      Cervical back: Normal range of motion.   Neurological:      General: No focal deficit present.      Mental Status: He is alert. Mental status is at baseline.   Psychiatric:         Mood and Affect: Mood normal.         Behavior: Behavior normal.         Thought Content: Thought content normal.         Judgment: Judgment normal.        Result Review :                   Assessment and Plan   Diagnoses and all orders for this visit:    1. Abdominal aortic aneurysm (AAA) without rupture, unspecified part (Primary)  -     Ambulatory Referral to Vascular Surgery    2. Spinal stenosis, unspecified spinal region    - keep referral to NSGY, given information today; he has received packet for referral but not " completed  - referral to vascular surgery to discuss AA management  - counseled on supportive care for back, OTC meds         Follow Up   No follow-ups on file.  Patient was given instructions and counseling regarding his condition or for health maintenance advice. Please see specific information pulled into the AVS if appropriate.

## 2023-05-22 NOTE — PROGRESS NOTES
"Patient ID: Moises Miranda is a 78 y.o. male is being seen for consultation today at the request of Pedrito Staples MD diagnosis weakness, spinal stenosis, unspecified spinal region.    Imaging- MRI Lumbar Spine WO 4/6/23    Treatments-  He reports current with pain management with  4 lumbar injection with no relief made his pain worse. He reports use of heat/ice with no relief.    Today, Moises Miranda reports lower back pain with radiating weakness, pain, numbness and tingling down bilateral leg.  He denies loss of bowel/bladder incontinence.     Subjective     The patient is here in regards to   Chief Complaint   Patient presents with   • Back Pain   • Leg Pain   • Tingling   • Extremity Weakness   • Numbness       History of Present Illness  Moises had 4 total epidural steroid injections with pain management at [facility].  He notes that the third injection resulted in significant pain and leg weakness where he felt like his leg was \"like a noodle\".  This has gradually improved and is starting to resolve.  He did get a fourth epidural steroid injection afterwards which seemed to be helpful.  He has a relatively abnormal gait but he feels like its improving spontaneously.      While in the room and during my examination of the patient I wore a mask and eye protection.  I washed my hands before and after this patient encounter.  The patient was also wearing a mask.    The following portions of the patient's history were reviewed and updated as appropriate: allergies, current medications, past family history, past medical history, past social history, past surgical history and problem list.    Review of Systems   Constitutional: Positive for activity change. Negative for chills and fever.   Eyes: Negative.    Respiratory: Negative for cough, chest tightness and shortness of breath.    Cardiovascular: Negative for chest pain, palpitations and leg swelling.   Gastrointestinal: Negative for abdominal pain and " constipation.   Genitourinary: Positive for frequency and urgency. Negative for difficulty urinating and enuresis.   Musculoskeletal: Positive for back pain. Negative for gait problem.   Skin: Negative for rash.   Neurological: Positive for weakness and numbness (or tingling).   Hematological: Does not bruise/bleed easily.   Psychiatric/Behavioral: Positive for sleep disturbance.        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   • Cancer     melanoma   • CHF (congestive heart failure)    • GERD (gastroesophageal reflux disease)    • Hyperlipidemia    • Rheumatoid arthritis        Allergies   Allergen Reactions   • Meloxicam Other (See Comments)     Doesn't remember the reaction        Family History   Problem Relation Age of Onset   • Hypertension Neg Hx        Social History     Socioeconomic History   • Marital status:    Tobacco Use   • Smoking status: Former   • Smokeless tobacco: Never   • Tobacco comments:     quit 1990   Vaping Use   • Vaping Use: Never used   Substance and Sexual Activity   • Alcohol use: Yes     Alcohol/week: 3.0 standard drinks     Types: 3 Cans of beer per week     Comment: few beers a week   • Drug use: No   • Sexual activity: Defer       Past Surgical History:   Procedure Laterality Date   • CARDIAC DEFIBRILLATOR PLACEMENT     • CYSTOSCOPY URETHROTOMY VISUAL INTERNAL N/A 9/28/2022    Procedure: CYSTOSCOPY WITH INTERNAL VISUAL URETHROTOMY;  Surgeon: Jose E Miranda MD;  Location: [facility];  Service: Urology;  Laterality: N/A;   • SKIN CANCER EXCISION  03/15/2013         Objective     Vitals:    05/25/23 1148   BP: 128/80   Pulse: 78   Temp: 97.3 °F (36.3 °C)   SpO2: 98%     Body mass index is 25.5 kg/m².    Physical Exam  Constitutional:       Appearance: Normal appearance.   HENT:      Head: Normocephalic and atraumatic.   Eyes:      Extraocular Movements: Extraocular movements intact.      Conjunctiva/sclera: Conjunctivae normal.       Pupils: Pupils are equal, round, and reactive to light.   Cardiovascular:      Rate and Rhythm: Normal rate and regular rhythm.      Pulses: Normal pulses.   Pulmonary:      Breath sounds: Normal breath sounds.   Abdominal:      Palpations: Abdomen is soft.   Musculoskeletal:         General: Normal range of motion.      Cervical back: Normal range of motion and neck supple.   Skin:     General: Skin is warm and dry.   Neurological:      Mental Status: He is alert and oriented to person, place, and time.      Cranial Nerves: Cranial nerves 2-12 are intact.      Motor: Motor function is intact. No weakness or atrophy.      Coordination: Coordination is intact. Romberg sign negative. Romberg Test normal.      Gait: Gait is intact. Gait normal.      Deep Tendon Reflexes: Reflexes are normal and symmetric.      Reflex Scores:       Tricep reflexes are 2+ on the right side and 2+ on the left side.       Bicep reflexes are 2+ on the right side and 2+ on the left side.       Brachioradialis reflexes are 2+ on the right side and 2+ on the left side.       Patellar reflexes are 2+ on the right side and 2+ on the left side.       Achilles reflexes are 2+ on the right side and 2+ on the left side.  Psychiatric:         Speech: Speech normal.         Neurologic Exam     Mental Status   Oriented to person, place, and time.   Attention: normal. Concentration: normal.   Speech: speech is normal   Level of consciousness: alert    Cranial Nerves   Cranial nerves II through XII intact.     CN III, IV, VI   Pupils are equal, round, and reactive to light.    Motor Exam   Muscle bulk: normal  Overall muscle tone: normal    Strength   Strength 5/5 except as noted.     Sensory Exam   Light touch normal.     Gait, Coordination, and Reflexes     Gait  Gait: normal    Coordination   Romberg: negative    Reflexes   Reflexes 2+ except as noted.   Right brachioradialis: 2+  Left brachioradialis: 2+  Right biceps: 2+  Left biceps: 2+  Right  triceps: 2+  Left triceps: 2+  Right patellar: 2+  Left patellar: 2+  Right achilles: 2+  Left achilles: 2+      Assessment & Plan   Independent Review of Radiographic Studies:      I personally reviewed the images from the following studies.    MR: MRI of the lumbar spine wo contrast was reviewed and shows Severe scoliosis with multiple levels of foraminal stenosis and degenerative disc disease.  No obvious central severe stenosis.    Assessment/Plan: Moises likely had a mild nerve root irritation from his third epidural steroid injection.  This seems to be resolving spontaneously and I recommended that he continue to treat this conservatively.  I do not think that he is necessarily symptomatic from his high degree of scoliosis and if he does become symptomatic, would likely need a very large deformity correction surgery.  I think he would benefit from physical therapy.    Medical Decision Making:      Follow-up as needed         Diagnoses and all orders for this visit:    1. Weakness of both lower extremities (Primary)  -     Ambulatory Referral to Physical Therapy             Patient Instructions/Recommendations:    Follow-up as needed      Suhail Dobbs MD  05/25/23  12:40 EDT

## 2023-05-25 ENCOUNTER — OFFICE VISIT (OUTPATIENT)
Dept: NEUROSURGERY | Facility: CLINIC | Age: 78
End: 2023-05-25
Payer: MEDICARE

## 2023-05-25 VITALS
WEIGHT: 158 LBS | BODY MASS INDEX: 25.5 KG/M2 | SYSTOLIC BLOOD PRESSURE: 128 MMHG | OXYGEN SATURATION: 98 % | DIASTOLIC BLOOD PRESSURE: 80 MMHG | HEART RATE: 78 BPM | TEMPERATURE: 97.3 F

## 2023-05-25 DIAGNOSIS — R29.898 WEAKNESS OF BOTH LOWER EXTREMITIES: Primary | ICD-10-CM

## 2023-05-30 ENCOUNTER — OFFICE VISIT (OUTPATIENT)
Dept: INTERNAL MEDICINE | Facility: CLINIC | Age: 78
End: 2023-05-30

## 2023-05-30 VITALS
HEART RATE: 60 BPM | BODY MASS INDEX: 25.39 KG/M2 | TEMPERATURE: 97.7 F | OXYGEN SATURATION: 99 % | WEIGHT: 158 LBS | DIASTOLIC BLOOD PRESSURE: 72 MMHG | HEIGHT: 66 IN | RESPIRATION RATE: 18 BRPM | SYSTOLIC BLOOD PRESSURE: 124 MMHG

## 2023-05-30 DIAGNOSIS — L03.90 CELLULITIS, UNSPECIFIED CELLULITIS SITE: Primary | ICD-10-CM

## 2023-05-30 PROCEDURE — 99213 OFFICE O/P EST LOW 20 MIN: CPT | Performed by: INTERNAL MEDICINE

## 2023-05-30 RX ORDER — DOXYCYCLINE HYCLATE 100 MG/1
100 CAPSULE ORAL 2 TIMES DAILY
Qty: 10 CAPSULE | Refills: 0 | Status: SHIPPED | OUTPATIENT
Start: 2023-05-30 | End: 2023-06-04

## 2023-05-30 RX ORDER — AMOXICILLIN 875 MG/1
875 TABLET, COATED ORAL 2 TIMES DAILY
Qty: 10 TABLET | Refills: 0 | Status: SHIPPED | OUTPATIENT
Start: 2023-05-30 | End: 2023-06-04

## 2023-05-30 NOTE — PROGRESS NOTES
"Chief Complaint  Rash (Left calf /10-12 days ago ( possible infection) )    Subjective        Moises Miranda presents to [facility]  History of Present Illness  Here with skin abrasion to left calf, redness, tender, warmth, mild discharge; no fevers    Objective   Vital Signs:  /72   Pulse 60   Temp 97.7 °F (36.5 °C)   Resp 18   Ht 167.6 cm (66\")   Wt 71.7 kg (158 lb)   SpO2 99%   BMI 25.50 kg/m²   Estimated body mass index is 25.5 kg/m² as calculated from the following:    Height as of this encounter: 167.6 cm (66\").    Weight as of this encounter: 71.7 kg (158 lb).             Physical Exam  Constitutional:       Appearance: Normal appearance.   HENT:      Head: Normocephalic and atraumatic.      Right Ear: External ear normal.      Left Ear: External ear normal.      Nose: Nose normal.      Mouth/Throat:      Mouth: Mucous membranes are moist.   Eyes:      Extraocular Movements: Extraocular movements intact.      Conjunctiva/sclera: Conjunctivae normal.   Pulmonary:      Effort: Pulmonary effort is normal. No respiratory distress.   Musculoskeletal:         General: Normal range of motion.      Cervical back: Normal range of motion.      Comments: Left calf with abrasion, minor skin wound, no fluctuance, ttp, mild redness   Neurological:      General: No focal deficit present.      Mental Status: He is alert. Mental status is at baseline.   Psychiatric:         Mood and Affect: Mood normal.         Behavior: Behavior normal.         Thought Content: Thought content normal.         Judgment: Judgment normal.      Result Review :                   Assessment and Plan   Diagnoses and all orders for this visit:    1. Cellulitis, unspecified cellulitis site (Primary)    Other orders  -     doxycycline (VIBRAMYCIN) 100 MG capsule; Take 1 capsule by mouth 2 (Two) Times a Day for 5 days.  Dispense: 10 capsule; Refill: 0  -     amoxicillin (AMOXIL) 875 MG tablet; " Take 1 tablet by mouth 2 (Two) Times a Day for 5 days.  Dispense: 10 tablet; Refill: 0      - doxy and amox  - discussed risks/benefits/alternatives of meds/treatments  - rtc worsneing, change in illness         Follow Up   No follow-ups on file.  Patient was given instructions and counseling regarding his condition or for health maintenance advice. Please see specific information pulled into the AVS if appropriate.

## 2023-08-16 RX ORDER — OMEPRAZOLE 20 MG/1
CAPSULE, DELAYED RELEASE ORAL
Qty: 90 CAPSULE | Refills: 2 | Status: SHIPPED | OUTPATIENT
Start: 2023-08-16

## 2024-01-10 ENCOUNTER — TELEMEDICINE (OUTPATIENT)
Dept: INTERNAL MEDICINE | Facility: CLINIC | Age: 79
End: 2024-01-10
Payer: MEDICARE

## 2024-01-10 DIAGNOSIS — U07.1 COVID-19 VIRUS DETECTED: Primary | ICD-10-CM

## 2024-01-10 PROCEDURE — 99441 PR PHYS/QHP TELEPHONE EVALUATION 5-10 MIN: CPT | Performed by: INTERNAL MEDICINE

## 2024-01-10 NOTE — PROGRESS NOTES
"Chief Complaint  No chief complaint on file.    Subjective        Moises Miranda presents to [facility]  History of Present Illness  COVID positive 2 days ago.  Symptoms started 3 days ago.  No shortness of breath.  He took Paxlovid about a year and a half ago reportedly for COVID illness and had no problem tolerating it.You have chosen to receive care through a telephone visit. Do you consent to use a telephone visit for your medical care today? Yes.  5 minutes spent on the phone with the patient  Objective   Vital Signs:  There were no vitals taken for this visit.  Estimated body mass index is 25.5 kg/m² as calculated from the following:    Height as of 5/30/23: 167.6 cm (66\").    Weight as of 5/30/23: 71.7 kg (158 lb).               Physical Exam   Result Review :                   Assessment and Plan   Diagnoses and all orders for this visit:    1. COVID-19 virus detected (Primary)    Other orders  -     Nirmatrelvir & Ritonavir, 300mg/100mg, (PAXLOVID) 20 x 150 MG & 10 x 100MG tablet therapy pack tablet; Take 3 tablets by mouth 2 (Two) Times a Day for 5 days.  Dispense: 30 tablet; Refill: 0    Tested positive for COVID on Monday.  Symptoms relatively mild but in light of his age and comorbid problems we will start the Paxlovid.  Cautioned on side effects.  Hold Lipitor while taking the Paxlovid.  Follow-up if any worsening of symptoms or be seen emergently         Follow Up   No follow-ups on file.  Patient was given instructions and counseling regarding his condition or for health maintenance advice. Please see specific information pulled into the AVS if appropriate.         "

## 2024-02-01 ENCOUNTER — TRANSCRIBE ORDERS (OUTPATIENT)
Dept: ADMINISTRATIVE | Facility: HOSPITAL | Age: 79
End: 2024-02-01
Payer: MEDICARE

## 2024-02-01 DIAGNOSIS — M85.9 LOW BONE DENSITY FOR AGE: ICD-10-CM

## 2024-02-01 DIAGNOSIS — M05.79 SEROPOSITIVE RHEUMATOID ARTHRITIS OF MULTIPLE SITES: Primary | ICD-10-CM

## 2024-03-14 ENCOUNTER — OFFICE VISIT (OUTPATIENT)
Dept: INTERNAL MEDICINE | Facility: CLINIC | Age: 79
End: 2024-03-14
Payer: MEDICARE

## 2024-03-14 VITALS
DIASTOLIC BLOOD PRESSURE: 62 MMHG | HEART RATE: 67 BPM | WEIGHT: 153 LBS | SYSTOLIC BLOOD PRESSURE: 120 MMHG | OXYGEN SATURATION: 98 % | HEIGHT: 66 IN | TEMPERATURE: 98 F | BODY MASS INDEX: 24.59 KG/M2

## 2024-03-14 DIAGNOSIS — F41.8 SITUATIONAL ANXIETY: ICD-10-CM

## 2024-03-14 DIAGNOSIS — D64.9 ANEMIA, UNSPECIFIED TYPE: ICD-10-CM

## 2024-03-14 DIAGNOSIS — Z76.89 ENCOUNTER TO ESTABLISH CARE: ICD-10-CM

## 2024-03-14 DIAGNOSIS — E78.00 PURE HYPERCHOLESTEROLEMIA: ICD-10-CM

## 2024-03-14 DIAGNOSIS — K21.9 GASTROESOPHAGEAL REFLUX DISEASE WITHOUT ESOPHAGITIS: ICD-10-CM

## 2024-03-14 DIAGNOSIS — R63.4 WEIGHT LOSS: ICD-10-CM

## 2024-03-14 DIAGNOSIS — I50.22 CHRONIC SYSTOLIC CONGESTIVE HEART FAILURE: ICD-10-CM

## 2024-03-14 DIAGNOSIS — E55.9 VITAMIN D DEFICIENCY: ICD-10-CM

## 2024-03-14 DIAGNOSIS — Z12.11 SCREENING FOR COLON CANCER: ICD-10-CM

## 2024-03-14 DIAGNOSIS — H69.91 DYSFUNCTION OF RIGHT EUSTACHIAN TUBE: ICD-10-CM

## 2024-03-14 DIAGNOSIS — H93.11 TINNITUS OF RIGHT EAR: Primary | ICD-10-CM

## 2024-03-14 PROBLEM — I71.43 INFRARENAL ABDOMINAL AORTIC ANEURYSM (AAA) WITHOUT RUPTURE: Status: ACTIVE | Noted: 2024-03-14

## 2024-03-14 PROBLEM — Z95.810 BIVENTRICULAR ICD (IMPLANTABLE CARDIOVERTER-DEFIBRILLATOR) IN PLACE: Status: ACTIVE | Noted: 2024-03-14

## 2024-03-14 PROBLEM — Z87.442 HISTORY OF NEPHROLITHIASIS: Status: ACTIVE | Noted: 2024-03-14

## 2024-03-14 PROBLEM — I25.10 CORONARY ARTERY DISEASE INVOLVING NATIVE CORONARY ARTERY OF NATIVE HEART WITHOUT ANGINA PECTORIS: Status: ACTIVE | Noted: 2024-03-14

## 2024-03-14 PROBLEM — R73.03 PREDIABETES: Status: ACTIVE | Noted: 2024-03-14

## 2024-03-14 PROBLEM — M48.061 SPINAL STENOSIS OF LUMBAR REGION WITHOUT NEUROGENIC CLAUDICATION: Status: ACTIVE | Noted: 2024-03-14

## 2024-03-14 PROBLEM — I47.29 NSVT (NONSUSTAINED VENTRICULAR TACHYCARDIA): Status: ACTIVE | Noted: 2024-03-14

## 2024-03-14 PROBLEM — Z85.820 HISTORY OF MELANOMA: Status: ACTIVE | Noted: 2024-03-14

## 2024-03-14 PROCEDURE — 1160F RVW MEDS BY RX/DR IN RCRD: CPT | Performed by: STUDENT IN AN ORGANIZED HEALTH CARE EDUCATION/TRAINING PROGRAM

## 2024-03-14 PROCEDURE — 99214 OFFICE O/P EST MOD 30 MIN: CPT | Performed by: STUDENT IN AN ORGANIZED HEALTH CARE EDUCATION/TRAINING PROGRAM

## 2024-03-14 PROCEDURE — 1159F MED LIST DOCD IN RCRD: CPT | Performed by: STUDENT IN AN ORGANIZED HEALTH CARE EDUCATION/TRAINING PROGRAM

## 2024-03-14 RX ORDER — OMEPRAZOLE 20 MG/1
20 CAPSULE, DELAYED RELEASE ORAL DAILY
Qty: 90 CAPSULE | Refills: 3 | Status: SHIPPED | OUTPATIENT
Start: 2024-03-14

## 2024-03-14 NOTE — PROGRESS NOTES
"Chief Complaint  Establish Care    Subjective        Moises Miranda presents to [facility]  History of Present Illness  Moises is an established patient of Pedrito Staples MD (5/13/2023 note reviewed), presenting to establish care with a new provider and for management of tinnitus and situational anxiety.  He has CAD, HFrEF, NSVT with  BiV-ICD pacer, hypercholesterolemia, GERD, AAA, RA, spinal stenosis, prediabetes, history of prostate cancer s/p radiation, history of melanoma, history of kidney stones.  Also follows with JF Wilson, rheumatology, Jose E Miranda MD, urology, Janae Tinoco MD, dermatology, Julien Shnaks MD, electrophysiology, Nehemias Ortiz MD, cardiology, pain management has referral to Ru Tello MD, vascular surgery for AAA.  Due for repeat colonoscopy, last 2/13/2020 from Jeferson Lemon MD, gastroenterology.     Tinnitus: Started in right ear about a month ago.  It has been lasting for several days, then will stop for up to 2 days, and restart.  He does note gradual increased hearing loss for a while, as well as recent increased stress due to his wife's health.  He saw urgent care 2/15/2024 (note reviewed) who treated him for AOM.  Tinnitus did not resolve.    GERD: Not having symptoms.  Asked about discontinuing omeprazole.    Situational anxiety: Currently very stressed due to his wife's illness and exceedingly slow recovery.    ROS: Denies headaches, changes in vision, sore throat, shortness of breath, palpitations, nausea/vomiting/constipation/diarrhea, abdominal pain, blood in urine or stool, leg swelling.    FH:     Medical: Deferred    Siblings: Deferred    Children: 2 daughters    Reproductive: Deferred.      Objective   Vital Signs:  /62   Pulse 67   Temp 98 °F (36.7 °C)   Ht 167.6 cm (66\")   Wt 69.4 kg (153 lb)   SpO2 98%   BMI 24.69 kg/m²   Estimated body mass index is 24.69 kg/m² as calculated from the " "following:    Height as of this encounter: 167.6 cm (66\").    Weight as of this encounter: 69.4 kg (153 lb).               Physical Exam  Vitals and nursing note reviewed.   Constitutional:       General: He is not in acute distress.     Appearance: Normal appearance.   HENT:      Head: Normocephalic and atraumatic.      Right Ear: Tympanic membrane, ear canal and external ear normal.      Left Ear: Tympanic membrane, ear canal and external ear normal.      Nose: Congestion present.      Mouth/Throat:      Lips: Pink.      Mouth: Mucous membranes are moist.      Pharynx: Oropharynx is clear.   Eyes:      Extraocular Movements: Extraocular movements intact.      Conjunctiva/sclera: Conjunctivae normal.   Cardiovascular:      Rate and Rhythm: Normal rate and regular rhythm.      Pulses: Normal pulses.      Heart sounds: Normal heart sounds. No murmur heard.     No friction rub. No gallop.   Pulmonary:      Effort: Pulmonary effort is normal.      Breath sounds: Normal breath sounds. No wheezing, rhonchi or rales.   Abdominal:      General: Abdomen is flat. Bowel sounds are normal. There is no distension.      Palpations: Abdomen is soft.      Tenderness: There is no abdominal tenderness. There is no right CVA tenderness, left CVA tenderness or guarding.   Musculoskeletal:      Right lower leg: No edema.      Left lower leg: No edema.   Skin:     General: Skin is warm and dry.      Capillary Refill: Capillary refill takes less than 2 seconds.   Neurological:      General: No focal deficit present.      Mental Status: He is alert. Mental status is at baseline.   Psychiatric:         Mood and Affect: Mood normal.         Behavior: Behavior normal.        Result Review :                   Assessment and Plan   Diagnoses and all orders for this visit:    1. Tinnitus of right ear (Primary)    2. Dysfunction of right eustachian tube    3. Gastroesophageal reflux disease without esophagitis  -     omeprazole (priLOSEC) 20 MG " capsule; Take 1 capsule by mouth Daily.  Dispense: 90 capsule; Refill: 3    4. Situational anxiety  -     CBC & Differential  -     Comprehensive Metabolic Panel  -     TSH Rfx On Abnormal To Free T4    5. Chronic systolic congestive heart failure  -     CBC & Differential  -     Comprehensive Metabolic Panel  -     TSH Rfx On Abnormal To Free T4    6. Pure hypercholesterolemia  -     Lipid Panel    7. Vitamin D deficiency  -     Vitamin D,25-Hydroxy    8. Encounter to establish care    9. Anemia, unspecified type  -     Ambulatory Referral to Gastroenterology    10. Weight loss  -     Ambulatory Referral to Gastroenterology    11. Screening for colon cancer  -     Ambulatory Referral to Gastroenterology    Tinnitus/eustachian tube dysfunction: Normal ear exam in clinic today.  Suspect eustachian tube dysfunction may be playing a role.  Encouraged continuing Flonase and using reverse Valsalva maneuver a few times a day to help open eustachian tube.  Tinnitus may be secondary to his current stress level or gradual hearing loss.  Discussed referral to ENT, but patient prefers to try home management longer first.    GERD: Counseled on how to discontinue omeprazole.  Refilled prescription as well since it will take several weeks to discontinue and he may find that he still needs it..    Situational anxiety: Discussed starting an SSRI and/or counseling.  Patient feels that he is coping well enough right now and declines both.  Provided additional information in AVS and reviewed in detail with patient.  Discussed methods for deep breathing, muscle relaxation, and importance of engaging in a supportive environment.  He indicates he does feel the need to get back to [employer] which he thinks will help.  Further management per patient request.     Social/Preventative: Screening labs ordered today    Risks: CAD, HFrEF    AAA screen: 4/20/2023, referred to vascular surgery for AAA management.    PSA: Followed by urology     Colonoscopy: 2/13/2020, repeat in 2023.  Jeferson Lemon MD, gastroenterology    Vaccinations: COVID, influenza, RSV this season, PPSV23 2017, PCV 13 3/2022, tetanus 3/2022    Foot exams: N/A    Nicotine: Quit 1990, started 1961, 29PY    Illicit drugs: None    EtOH: 2/week    Home: Lives at home with wife, feels safe    Work:  Working deferred.    Social: None currently, feels like he should get back to [employer].    Exercise: Active outdoors, does a lot of fishing during fishing season.    Diet: Deferred    03/15/24  Talked with patient's wife, Enriqueta, on phone and relayed lab results with patient in the background.  Confirmed that patient is taking the vitamin supplements in his med list.  She reports patient has been eating less, possibly due to increased worry about his wife's health.  Patient does have about a 10 pound weight loss over the past 2 years.  Will plan to continue to monitor labs and recheck at the next visit.  Records show last colonoscopy 2/13/2020 from Jeferson Lemon MD, gastroenterology, with a repeat recommended in 3 years.  Patient would like to be [facility] at [facility].  Referral placed.      Current Outpatient Medications:     alendronate (FOSAMAX) 70 MG tablet, Take 1 tablet by mouth Every 7 (Seven) Days., Disp: , Rfl:     aspirin 81 MG EC tablet, 1 tablet Daily., Disp: , Rfl:     atorvastatin (LIPITOR) 20 MG tablet, Take 1 tablet by mouth., Disp: , Rfl:     carvedilol (COREG) 12.5 MG tablet, TAKE 1/2 TABLET TWICE DAILY WITH MEALS, Disp: , Rfl:     carvedilol (COREG) 12.5 MG tablet, Take 1 tablet by mouth 2 (Two) Times a Day With Meals., Disp: , Rfl:     fluticasone (FLONASE) 50 MCG/ACT nasal spray, 2 sprays into the nostril(s) as directed by provider Daily., Disp: 16 g, Rfl: 0    folic acid (FOLVITE) 1 MG tablet, Take 1 tablet by mouth 3 (Three) Times a Day., Disp: , Rfl:     furosemide (LASIX) 20 MG tablet, Take 1 tablet by mouth Daily., Disp: , Rfl:     furosemide (LASIX) 40 MG  tablet, 1/2 tab PO q daily, Disp: , Rfl:     loratadine (CLARITIN) 10 MG tablet, Take 1 tablet by mouth Daily., Disp: 30 tablet, Rfl: 0    methotrexate 2.5 MG tablet, , Disp: , Rfl:     omeprazole (priLOSEC) 20 MG capsule, Take 1 capsule by mouth Daily., Disp: 90 capsule, Rfl: 3    Pediatric Multivitamins-Fl (MULTI VIT/FL PO), Take  by mouth., Disp: , Rfl:     Pediatric Multivitamins-Iron (multivitamin chewable) chewable tablet, 1 tablet Daily., Disp: , Rfl:     predniSONE (DELTASONE) 5 MG tablet, Take 1 tablet by mouth Daily., Disp: , Rfl:     sacubitril-valsartan (Entresto) 24-26 MG tablet, Take 1 tablet by mouth 2 (Two) Times a Day., Disp: 60 tablet, Rfl: 3    spironolactone (ALDACTONE) 25 MG tablet, , Disp: , Rfl:     spironolactone (ALDACTONE) 25 MG tablet, Take 0.5 tablets by mouth Daily., Disp: , Rfl:     tamsulosin (FLOMAX) 0.4 MG capsule 24 hr capsule, Take 1 capsule by mouth Daily., Disp: , Rfl:      I spent 39 minutes caring for Moises on this date of service. This time includes time spent by me in the following activities:preparing for the visit, reviewing tests, obtaining and/or reviewing a separately obtained history, performing a medically appropriate examination and/or evaluation , counseling and educating the patient/family/caregiver, ordering medications, tests, or procedures, and documenting information in the medical record  Follow Up   Return in about 3 months (around 6/14/2024) for Medicare Wellness.  Patient was given instructions and counseling regarding his condition or for health maintenance advice. Please see specific information pulled into the AVS if appropriate.

## 2024-03-15 LAB
25(OH)D3+25(OH)D2 SERPL-MCNC: 29.2 NG/ML (ref 30–100)
ALBUMIN SERPL-MCNC: 4.3 G/DL (ref 3.5–5.2)
ALBUMIN/GLOB SERPL: 2.3 G/DL
ALP SERPL-CCNC: 68 U/L (ref 39–117)
ALT SERPL-CCNC: 25 U/L (ref 1–41)
AST SERPL-CCNC: 31 U/L (ref 1–40)
BASOPHILS # BLD AUTO: 0.01 10*3/MM3 (ref 0–0.2)
BASOPHILS NFR BLD AUTO: 0.1 % (ref 0–1.5)
BILIRUB SERPL-MCNC: 1.2 MG/DL (ref 0–1.2)
BUN SERPL-MCNC: 25 MG/DL (ref 8–23)
BUN/CREAT SERPL: 22.3 (ref 7–25)
CALCIUM SERPL-MCNC: 9 MG/DL (ref 8.6–10.5)
CHLORIDE SERPL-SCNC: 106 MMOL/L (ref 98–107)
CHOLEST SERPL-MCNC: 162 MG/DL (ref 0–200)
CO2 SERPL-SCNC: 23 MMOL/L (ref 22–29)
CREAT SERPL-MCNC: 1.12 MG/DL (ref 0.76–1.27)
EGFRCR SERPLBLD CKD-EPI 2021: 67.7 ML/MIN/1.73
EOSINOPHIL # BLD AUTO: 0.03 10*3/MM3 (ref 0–0.4)
EOSINOPHIL NFR BLD AUTO: 0.3 % (ref 0.3–6.2)
ERYTHROCYTE [DISTWIDTH] IN BLOOD BY AUTOMATED COUNT: 14.6 % (ref 12.3–15.4)
GLOBULIN SER CALC-MCNC: 1.9 GM/DL
GLUCOSE SERPL-MCNC: 107 MG/DL (ref 65–99)
HCT VFR BLD AUTO: 38.1 % (ref 37.5–51)
HDLC SERPL-MCNC: 49 MG/DL (ref 40–60)
HGB BLD-MCNC: 12.7 G/DL (ref 13–17.7)
IMM GRANULOCYTES # BLD AUTO: 0.09 10*3/MM3 (ref 0–0.05)
IMM GRANULOCYTES NFR BLD AUTO: 0.9 % (ref 0–0.5)
LDLC SERPL CALC-MCNC: 81 MG/DL (ref 0–100)
LYMPHOCYTES # BLD AUTO: 0.97 10*3/MM3 (ref 0.7–3.1)
LYMPHOCYTES NFR BLD AUTO: 9.7 % (ref 19.6–45.3)
MCH RBC QN AUTO: 33 PG (ref 26.6–33)
MCHC RBC AUTO-ENTMCNC: 33.3 G/DL (ref 31.5–35.7)
MCV RBC AUTO: 99 FL (ref 79–97)
MONOCYTES # BLD AUTO: 0.66 10*3/MM3 (ref 0.1–0.9)
MONOCYTES NFR BLD AUTO: 6.6 % (ref 5–12)
NEUTROPHILS # BLD AUTO: 8.24 10*3/MM3 (ref 1.7–7)
NEUTROPHILS NFR BLD AUTO: 82.4 % (ref 42.7–76)
NRBC BLD AUTO-RTO: 0 /100 WBC (ref 0–0.2)
PLATELET # BLD AUTO: 162 10*3/MM3 (ref 140–450)
POTASSIUM SERPL-SCNC: 4.3 MMOL/L (ref 3.5–5.2)
PROT SERPL-MCNC: 6.2 G/DL (ref 6–8.5)
RBC # BLD AUTO: 3.85 10*6/MM3 (ref 4.14–5.8)
SODIUM SERPL-SCNC: 141 MMOL/L (ref 136–145)
TRIGL SERPL-MCNC: 193 MG/DL (ref 0–150)
TSH SERPL DL<=0.005 MIU/L-ACNC: 1.04 UIU/ML (ref 0.27–4.2)
VLDLC SERPL CALC-MCNC: 32 MG/DL (ref 5–40)
WBC # BLD AUTO: 10 10*3/MM3 (ref 3.4–10.8)

## 2024-04-02 ENCOUNTER — APPOINTMENT (OUTPATIENT)
Dept: BONE DENSITY | Facility: HOSPITAL | Age: 79
End: 2024-04-02
Payer: MEDICARE

## 2024-04-02 DIAGNOSIS — M05.79 SEROPOSITIVE RHEUMATOID ARTHRITIS OF MULTIPLE SITES: ICD-10-CM

## 2024-04-02 DIAGNOSIS — M85.9 LOW BONE DENSITY FOR AGE: ICD-10-CM

## 2024-04-02 PROCEDURE — 77080 DXA BONE DENSITY AXIAL: CPT

## 2024-04-11 ENCOUNTER — LAB (OUTPATIENT)
Dept: LAB | Facility: HOSPITAL | Age: 79
End: 2024-04-11
Payer: MEDICARE

## 2024-04-11 ENCOUNTER — OFFICE VISIT (OUTPATIENT)
Dept: GASTROENTEROLOGY | Facility: CLINIC | Age: 79
End: 2024-04-11
Payer: MEDICARE

## 2024-04-11 VITALS
BODY MASS INDEX: 24.73 KG/M2 | DIASTOLIC BLOOD PRESSURE: 60 MMHG | SYSTOLIC BLOOD PRESSURE: 102 MMHG | WEIGHT: 153.85 LBS | HEIGHT: 66 IN

## 2024-04-11 DIAGNOSIS — D64.9 ANEMIA, UNSPECIFIED TYPE: ICD-10-CM

## 2024-04-11 DIAGNOSIS — K21.9 GASTROESOPHAGEAL REFLUX DISEASE WITHOUT ESOPHAGITIS: ICD-10-CM

## 2024-04-11 DIAGNOSIS — Z86.010 PERSONAL HISTORY OF COLONIC POLYPS: Primary | ICD-10-CM

## 2024-04-11 PROBLEM — Z86.0100 PERSONAL HISTORY OF COLONIC POLYPS: Status: ACTIVE | Noted: 2024-04-11

## 2024-04-11 LAB
BASOPHILS # BLD AUTO: 0.06 10*3/MM3 (ref 0–0.2)
BASOPHILS NFR BLD AUTO: 0.7 % (ref 0–1.5)
DEPRECATED RDW RBC AUTO: 54.3 FL (ref 37–54)
EOSINOPHIL # BLD AUTO: 0.09 10*3/MM3 (ref 0–0.4)
EOSINOPHIL NFR BLD AUTO: 1 % (ref 0.3–6.2)
ERYTHROCYTE [DISTWIDTH] IN BLOOD BY AUTOMATED COUNT: 14.6 % (ref 12.3–15.4)
HCT VFR BLD AUTO: 40.9 % (ref 37.5–51)
HGB BLD-MCNC: 13.9 G/DL (ref 13–17.7)
IMM GRANULOCYTES # BLD AUTO: 0.09 10*3/MM3 (ref 0–0.05)
IMM GRANULOCYTES NFR BLD AUTO: 1 % (ref 0–0.5)
IRON 24H UR-MRATE: 64 MCG/DL (ref 59–158)
IRON SATN MFR SERPL: 17 % (ref 20–50)
LYMPHOCYTES # BLD AUTO: 1.16 10*3/MM3 (ref 0.7–3.1)
LYMPHOCYTES NFR BLD AUTO: 12.6 % (ref 19.6–45.3)
MCH RBC QN AUTO: 35 PG (ref 26.6–33)
MCHC RBC AUTO-ENTMCNC: 34 G/DL (ref 31.5–35.7)
MCV RBC AUTO: 103 FL (ref 79–97)
MONOCYTES # BLD AUTO: 0.65 10*3/MM3 (ref 0.1–0.9)
MONOCYTES NFR BLD AUTO: 7.1 % (ref 5–12)
NEUTROPHILS NFR BLD AUTO: 7.13 10*3/MM3 (ref 1.7–7)
NEUTROPHILS NFR BLD AUTO: 77.6 % (ref 42.7–76)
NRBC BLD AUTO-RTO: 0 /100 WBC (ref 0–0.2)
PLATELET # BLD AUTO: 169 10*3/MM3 (ref 140–450)
PMV BLD AUTO: 12.4 FL (ref 6–12)
RBC # BLD AUTO: 3.97 10*6/MM3 (ref 4.14–5.8)
RETICS # AUTO: 0.04 10*6/MM3 (ref 0.02–0.13)
RETICS/RBC NFR AUTO: 1.01 % (ref 0.7–1.9)
TIBC SERPL-MCNC: 370 MCG/DL (ref 298–536)
TRANSFERRIN SERPL-MCNC: 248 MG/DL (ref 200–360)
VIT B12 BLD-MCNC: 473 PG/ML (ref 211–946)
WBC NRBC COR # BLD AUTO: 9.18 10*3/MM3 (ref 3.4–10.8)

## 2024-04-11 PROCEDURE — 82607 VITAMIN B-12: CPT | Performed by: INTERNAL MEDICINE

## 2024-04-11 PROCEDURE — 36415 COLL VENOUS BLD VENIPUNCTURE: CPT | Performed by: INTERNAL MEDICINE

## 2024-04-11 PROCEDURE — 85045 AUTOMATED RETICULOCYTE COUNT: CPT

## 2024-04-11 PROCEDURE — 85014 HEMATOCRIT: CPT

## 2024-04-11 PROCEDURE — 82747 ASSAY OF FOLIC ACID RBC: CPT

## 2024-04-11 PROCEDURE — 85025 COMPLETE CBC W/AUTO DIFF WBC: CPT | Performed by: INTERNAL MEDICINE

## 2024-04-11 PROCEDURE — 83540 ASSAY OF IRON: CPT | Performed by: INTERNAL MEDICINE

## 2024-04-11 PROCEDURE — 84466 ASSAY OF TRANSFERRIN: CPT | Performed by: INTERNAL MEDICINE

## 2024-04-11 NOTE — PROGRESS NOTES
"    PATIENT INFORMATION  Moises Miranda       - 1945    CHIEF COMPLAINT  Chief Complaint   Patient presents with    Anemia    Weight Loss       HISTORY OF PRESENT ILLNESS  Her for history of Colon polyps and is last exam was 2020 and had a single 12 mm polyp-path unavailable    Also has a NC/NC anemia and no recent iron or B12 levels nor a retic %    Has DCM and a defibrillator( 5-6 years ago)- last Echo was  and EF was 34%    Creat this year was 1.1  and stable             REVIEWED PERTINENT RESULTS/ LABS  No results found for: \"CASEREPORT\", \"FINALDX\"  Lab Results   Component Value Date    HGB 12.7 (L) 2024    MCV 99.0 (H) 2024     2024    ALT 25 2024    AST 31 2024    HGBA1C 6.00 (H) 2022    INR 1.0 2022    TRIG 193 (H) 2024      DEXA Bone Density Axial    Result Date: 2024  Narrative: DEXA BONE DENSITY AXIAL Date of Exam:  2024 11:04 AM EDT Indication:  M05.79 Comparison:  None available. Technique:  Lumbar vertebral and proximal femoral Dual-Energy X-ray Absorptiometry (DEXA) was performed on the [employer] C. Findings: According to the World Health Organization criteria, this is classified as osteopenic. The T-score at the femoral neck is -1.5.  The T-score for the total spine is 4.7. There appears to be scoliosis and sclerotic degenerative changes in the lumbar spine which may account for the elevated T score. Using the FRAX scores, which utilized risk factors and femoral neck bone density: The 10 year probability of major osteoporotic fracture is 9.4%. The 10 year probability of a hip fracture is 3.3%.     Impression: Impression: Osteopenia. Based upon the National Osteoporosis Foundation Guide, patient's FRAX score does meet criteria for pharmacologic treatment to prevent Osteoporosis.  Individual treatment decisions should be made based upon each patient's full clinical history  and risk factors. Electronically Signed: Ru " Nett  4/2/2024 11:26 AM EDT  Workstation ID: [id number]     REVIEW OF SYSTEMS  Review of Systems   Constitutional:  Negative for activity change, chills, fever and unexpected weight change.   HENT:  Negative for congestion.    Eyes:  Negative for visual disturbance.   Respiratory:  Negative for shortness of breath.    Cardiovascular:  Negative for chest pain and palpitations.   Gastrointestinal:  Negative for abdominal pain and blood in stool.   Endocrine: Negative for cold intolerance and heat intolerance.   Genitourinary:  Negative for hematuria.   Musculoskeletal:  Negative for gait problem.   Skin:  Negative for color change.   Allergic/Immunologic: Negative for immunocompromised state.   Neurological:  Negative for weakness and light-headedness.   Hematological:  Negative for adenopathy.   Psychiatric/Behavioral:  Negative for sleep disturbance. The patient is not nervous/anxious.          ACTIVE PROBLEMS  Patient Active Problem List    Diagnosis     Anemia [D64.9]     Personal history of colonic polyps [Z86.010]     Chronic systolic congestive heart failure [I50.22]     Coronary artery disease involving native coronary artery of native heart without angina pectoris [I25.10]     NSVT (nonsustained ventricular tachycardia) [I47.29]     Biventricular ICD (implantable cardioverter-defibrillator) in place [Z95.810]     Infrarenal abdominal aortic aneurysm (AAA) without rupture [I71.43]     Spinal stenosis of lumbar region without neurogenic claudication [M48.061]     Prediabetes [R73.03]     History of melanoma [Z85.820]     History of nephrolithiasis [Z87.442]     Bulbous urethral stricture [N35.912]     Mass of soft tissue of knee [M79.89]     Osteoarthritis of left knee [M17.12]     Gastroesophageal reflux disease without esophagitis [K21.9]     Rheumatoid arthritis [M06.9]     Prostate cancer [C61]     Pure hypercholesterolemia [E78.00]          PAST MEDICAL HISTORY  Past Medical History:   Diagnosis Date     Bulbous urethral stricture 9/28/2022    Cancer     melanoma    CHF (congestive heart failure)     GERD (gastroesophageal reflux disease)     Hyperlipidemia     Rheumatoid arthritis          SURGICAL HISTORY  Past Surgical History:   Procedure Laterality Date    CARDIAC DEFIBRILLATOR PLACEMENT      CYSTOSCOPY URETHROTOMY VISUAL INTERNAL N/A 9/28/2022    Procedure: CYSTOSCOPY WITH INTERNAL VISUAL URETHROTOMY;  Surgeon: Jose E Miranda MD;  Location: [facility];  Service: Urology;  Laterality: N/A;    SKIN CANCER EXCISION  03/15/2013         FAMILY HISTORY  Family History   Problem Relation Age of Onset    Hypertension Neg Hx          SOCIAL HISTORY  Social History     Occupational History    Not on file   Tobacco Use    Smoking status: Former     Types: Cigarettes    Smokeless tobacco: Never    Tobacco comments:     quit 1990   Vaping Use    Vaping status: Never Used   Substance and Sexual Activity    Alcohol use: Yes     Alcohol/week: 3.0 standard drinks of alcohol     Types: 3 Cans of beer per week     Comment: few beers a week    Drug use: No    Sexual activity: Defer         CURRENT MEDICATIONS    Current Outpatient Medications:     alendronate (FOSAMAX) 70 MG tablet, Take 1 tablet by mouth Every 7 (Seven) Days., Disp: , Rfl:     aspirin 81 MG EC tablet, 1 tablet Daily., Disp: , Rfl:     atorvastatin (LIPITOR) 20 MG tablet, Take 1 tablet by mouth., Disp: , Rfl:     carvedilol (COREG) 12.5 MG tablet, TAKE 1/2 TABLET TWICE DAILY WITH MEALS, Disp: , Rfl:     fluticasone (FLONASE) 50 MCG/ACT nasal spray, 2 sprays into the nostril(s) as directed by provider Daily., Disp: 16 g, Rfl: 0    folic acid (FOLVITE) 1 MG tablet, Take 1 tablet by mouth 3 (Three) Times a Day., Disp: , Rfl:     furosemide (LASIX) 20 MG tablet, Take 1 tablet by mouth Daily., Disp: , Rfl:     furosemide (LASIX) 40 MG tablet, 1/2 tab PO q daily, Disp: , Rfl:     loratadine (CLARITIN) 10 MG tablet, Take 1 tablet by mouth Daily., Disp: 30 tablet,  "Rfl: 0    methotrexate 2.5 MG tablet, , Disp: , Rfl:     omeprazole (priLOSEC) 20 MG capsule, Take 1 capsule by mouth Daily., Disp: 90 capsule, Rfl: 3    Pediatric Multivitamins-Iron (multivitamin chewable) chewable tablet, 1 tablet Daily., Disp: , Rfl:     predniSONE (DELTASONE) 5 MG tablet, Take 1 tablet by mouth Daily., Disp: , Rfl:     sacubitril-valsartan (Entresto) 24-26 MG tablet, Take 1 tablet by mouth 2 (Two) Times a Day., Disp: 60 tablet, Rfl: 3    spironolactone (ALDACTONE) 25 MG tablet, , Disp: , Rfl:     tamsulosin (FLOMAX) 0.4 MG capsule 24 hr capsule, Take 1 capsule by mouth Daily., Disp: , Rfl:     carvedilol (COREG) 12.5 MG tablet, Take 1 tablet by mouth 2 (Two) Times a Day With Meals., Disp: , Rfl:     Pediatric Multivitamins-Fl (MULTI VIT/FL PO), Take  by mouth., Disp: , Rfl:     ALLERGIES  Meloxicam    VITALS  Vitals:    04/11/24 0859   BP: 102/60   BP Location: Left arm   Patient Position: Sitting   Cuff Size: Adult   Weight: 69.8 kg (153 lb 13.6 oz)   Height: 167.6 cm (66\")       PHYSICAL EXAM  Debilities/Disabilities Identified: None  Emotional Behavior: Appropriate  Wt Readings from Last 3 Encounters:   04/11/24 69.8 kg (153 lb 13.6 oz)   03/14/24 69.4 kg (153 lb)   02/15/24 71.7 kg (158 lb)     Ht Readings from Last 1 Encounters:   04/11/24 167.6 cm (66\")     Body mass index is 24.83 kg/m².  Physical Exam  Constitutional:       Appearance: He is well-developed. He is not diaphoretic.   HENT:      Head: Normocephalic and atraumatic.   Eyes:      General: No scleral icterus.     Conjunctiva/sclera: Conjunctivae normal.      Pupils: Pupils are equal, round, and reactive to light.   Neck:      Thyroid: No thyromegaly.   Cardiovascular:      Rate and Rhythm: Normal rate and regular rhythm.      Heart sounds: Normal heart sounds. No murmur heard.     No gallop.   Pulmonary:      Effort: Pulmonary effort is normal.      Breath sounds: Normal breath sounds. No wheezing or rales.   Abdominal:      " General: Bowel sounds are normal. There is no distension or abdominal bruit.      Palpations: Abdomen is soft. There is no shifting dullness, fluid wave or mass.      Tenderness: There is no abdominal tenderness. There is no guarding. Negative signs include Gibbs's sign.      Hernia: There is no hernia in the ventral area.   Musculoskeletal:         General: Normal range of motion.      Cervical back: Normal range of motion and neck supple.   Lymphadenopathy:      Cervical: No cervical adenopathy.   Skin:     General: Skin is warm and dry.      Findings: No erythema or rash.   Neurological:      Mental Status: He is alert and oriented to person, place, and time.   Psychiatric:         Mood and Affect: Mood normal.         Behavior: Behavior normal.         CLINICAL DATA REVIEWED   reviewed previous lab results and integrated with today's visit, reviewed notes from other physicians and/or last GI encounter, reviewed previous endoscopy results and available photos, reviewed surgical pathology results from previous biopsies    ASSESSMENT  Diagnoses and all orders for this visit:    Personal history of colonic polyps  -     Case Request; Standing  -     Case Request    Anemia, unspecified type  -     Vitamin B12  -     Reticulocytes; Future  -     CBC & Differential  -     Iron Profile  -     Folate RBC; Future    Gastroesophageal reflux disease without esophagitis    Other orders  -     Follow Anesthesia Guidelines / Protocol; Future  -     Verify bowel prep was successful; Standing  -     Give tap water enema if bowel prep was insufficient; Standing  -     Obtain Informed Consent; Standing          PLAN  Return if symptoms worsen or fail to improve.    I have discussed the above plan with the patient.  They verbalize understanding and are in agreement with the plan.  They have been advised to contact the office for any questions, concerns, or changes related to their health.

## 2024-04-11 NOTE — H&P (VIEW-ONLY)
"    PATIENT INFORMATION  Moises Miranda       - 1945    CHIEF COMPLAINT  Chief Complaint   Patient presents with    Anemia    Weight Loss       HISTORY OF PRESENT ILLNESS  Her for history of Colon polyps and is last exam was 2020 and had a single 12 mm polyp-path unavailable    Also has a NC/NC anemia and no recent iron or B12 levels nor a retic %    Has DCM and a defibrillator( 5-6 years ago)- last Echo was  and EF was 34%    Creat this year was 1.1  and stable             REVIEWED PERTINENT RESULTS/ LABS  No results found for: \"CASEREPORT\", \"FINALDX\"  Lab Results   Component Value Date    HGB 12.7 (L) 2024    MCV 99.0 (H) 2024     2024    ALT 25 2024    AST 31 2024    HGBA1C 6.00 (H) 2022    INR 1.0 2022    TRIG 193 (H) 2024      DEXA Bone Density Axial    Result Date: 2024  Narrative: DEXA BONE DENSITY AXIAL Date of Exam:  2024 11:04 AM EDT Indication:  M05.79 Comparison:  None available. Technique:  Lumbar vertebral and proximal femoral Dual-Energy X-ray Absorptiometry (DEXA) was performed on the [employer] C. Findings: According to the World Health Organization criteria, this is classified as osteopenic. The T-score at the femoral neck is -1.5.  The T-score for the total spine is 4.7. There appears to be scoliosis and sclerotic degenerative changes in the lumbar spine which may account for the elevated T score. Using the FRAX scores, which utilized risk factors and femoral neck bone density: The 10 year probability of major osteoporotic fracture is 9.4%. The 10 year probability of a hip fracture is 3.3%.     Impression: Impression: Osteopenia. Based upon the National Osteoporosis Foundation Guide, patient's FRAX score does meet criteria for pharmacologic treatment to prevent Osteoporosis.  Individual treatment decisions should be made based upon each patient's full clinical history  and risk factors. Electronically Signed: Ru " Nett  4/2/2024 11:26 AM EDT  Workstation ID: [id number]     REVIEW OF SYSTEMS  Review of Systems   Constitutional:  Negative for activity change, chills, fever and unexpected weight change.   HENT:  Negative for congestion.    Eyes:  Negative for visual disturbance.   Respiratory:  Negative for shortness of breath.    Cardiovascular:  Negative for chest pain and palpitations.   Gastrointestinal:  Negative for abdominal pain and blood in stool.   Endocrine: Negative for cold intolerance and heat intolerance.   Genitourinary:  Negative for hematuria.   Musculoskeletal:  Negative for gait problem.   Skin:  Negative for color change.   Allergic/Immunologic: Negative for immunocompromised state.   Neurological:  Negative for weakness and light-headedness.   Hematological:  Negative for adenopathy.   Psychiatric/Behavioral:  Negative for sleep disturbance. The patient is not nervous/anxious.          ACTIVE PROBLEMS  Patient Active Problem List    Diagnosis     Anemia [D64.9]     Personal history of colonic polyps [Z86.010]     Chronic systolic congestive heart failure [I50.22]     Coronary artery disease involving native coronary artery of native heart without angina pectoris [I25.10]     NSVT (nonsustained ventricular tachycardia) [I47.29]     Biventricular ICD (implantable cardioverter-defibrillator) in place [Z95.810]     Infrarenal abdominal aortic aneurysm (AAA) without rupture [I71.43]     Spinal stenosis of lumbar region without neurogenic claudication [M48.061]     Prediabetes [R73.03]     History of melanoma [Z85.820]     History of nephrolithiasis [Z87.442]     Bulbous urethral stricture [N35.912]     Mass of soft tissue of knee [M79.89]     Osteoarthritis of left knee [M17.12]     Gastroesophageal reflux disease without esophagitis [K21.9]     Rheumatoid arthritis [M06.9]     Prostate cancer [C61]     Pure hypercholesterolemia [E78.00]          PAST MEDICAL HISTORY  Past Medical History:   Diagnosis Date     Bulbous urethral stricture 9/28/2022    Cancer     melanoma    CHF (congestive heart failure)     GERD (gastroesophageal reflux disease)     Hyperlipidemia     Rheumatoid arthritis          SURGICAL HISTORY  Past Surgical History:   Procedure Laterality Date    CARDIAC DEFIBRILLATOR PLACEMENT      CYSTOSCOPY URETHROTOMY VISUAL INTERNAL N/A 9/28/2022    Procedure: CYSTOSCOPY WITH INTERNAL VISUAL URETHROTOMY;  Surgeon: Jose E Miranda MD;  Location: [facility];  Service: Urology;  Laterality: N/A;    SKIN CANCER EXCISION  03/15/2013         FAMILY HISTORY  Family History   Problem Relation Age of Onset    Hypertension Neg Hx          SOCIAL HISTORY  Social History     Occupational History    Not on file   Tobacco Use    Smoking status: Former     Types: Cigarettes    Smokeless tobacco: Never    Tobacco comments:     quit 1990   Vaping Use    Vaping status: Never Used   Substance and Sexual Activity    Alcohol use: Yes     Alcohol/week: 3.0 standard drinks of alcohol     Types: 3 Cans of beer per week     Comment: few beers a week    Drug use: No    Sexual activity: Defer         CURRENT MEDICATIONS    Current Outpatient Medications:     alendronate (FOSAMAX) 70 MG tablet, Take 1 tablet by mouth Every 7 (Seven) Days., Disp: , Rfl:     aspirin 81 MG EC tablet, 1 tablet Daily., Disp: , Rfl:     atorvastatin (LIPITOR) 20 MG tablet, Take 1 tablet by mouth., Disp: , Rfl:     carvedilol (COREG) 12.5 MG tablet, TAKE 1/2 TABLET TWICE DAILY WITH MEALS, Disp: , Rfl:     fluticasone (FLONASE) 50 MCG/ACT nasal spray, 2 sprays into the nostril(s) as directed by provider Daily., Disp: 16 g, Rfl: 0    folic acid (FOLVITE) 1 MG tablet, Take 1 tablet by mouth 3 (Three) Times a Day., Disp: , Rfl:     furosemide (LASIX) 20 MG tablet, Take 1 tablet by mouth Daily., Disp: , Rfl:     furosemide (LASIX) 40 MG tablet, 1/2 tab PO q daily, Disp: , Rfl:     loratadine (CLARITIN) 10 MG tablet, Take 1 tablet by mouth Daily., Disp: 30 tablet,  "Rfl: 0    methotrexate 2.5 MG tablet, , Disp: , Rfl:     omeprazole (priLOSEC) 20 MG capsule, Take 1 capsule by mouth Daily., Disp: 90 capsule, Rfl: 3    Pediatric Multivitamins-Iron (multivitamin chewable) chewable tablet, 1 tablet Daily., Disp: , Rfl:     predniSONE (DELTASONE) 5 MG tablet, Take 1 tablet by mouth Daily., Disp: , Rfl:     sacubitril-valsartan (Entresto) 24-26 MG tablet, Take 1 tablet by mouth 2 (Two) Times a Day., Disp: 60 tablet, Rfl: 3    spironolactone (ALDACTONE) 25 MG tablet, , Disp: , Rfl:     tamsulosin (FLOMAX) 0.4 MG capsule 24 hr capsule, Take 1 capsule by mouth Daily., Disp: , Rfl:     carvedilol (COREG) 12.5 MG tablet, Take 1 tablet by mouth 2 (Two) Times a Day With Meals., Disp: , Rfl:     Pediatric Multivitamins-Fl (MULTI VIT/FL PO), Take  by mouth., Disp: , Rfl:     ALLERGIES  Meloxicam    VITALS  Vitals:    04/11/24 0859   BP: 102/60   BP Location: Left arm   Patient Position: Sitting   Cuff Size: Adult   Weight: 69.8 kg (153 lb 13.6 oz)   Height: 167.6 cm (66\")       PHYSICAL EXAM  Debilities/Disabilities Identified: None  Emotional Behavior: Appropriate  Wt Readings from Last 3 Encounters:   04/11/24 69.8 kg (153 lb 13.6 oz)   03/14/24 69.4 kg (153 lb)   02/15/24 71.7 kg (158 lb)     Ht Readings from Last 1 Encounters:   04/11/24 167.6 cm (66\")     Body mass index is 24.83 kg/m².  Physical Exam  Constitutional:       Appearance: He is well-developed. He is not diaphoretic.   HENT:      Head: Normocephalic and atraumatic.   Eyes:      General: No scleral icterus.     Conjunctiva/sclera: Conjunctivae normal.      Pupils: Pupils are equal, round, and reactive to light.   Neck:      Thyroid: No thyromegaly.   Cardiovascular:      Rate and Rhythm: Normal rate and regular rhythm.      Heart sounds: Normal heart sounds. No murmur heard.     No gallop.   Pulmonary:      Effort: Pulmonary effort is normal.      Breath sounds: Normal breath sounds. No wheezing or rales.   Abdominal:      " General: Bowel sounds are normal. There is no distension or abdominal bruit.      Palpations: Abdomen is soft. There is no shifting dullness, fluid wave or mass.      Tenderness: There is no abdominal tenderness. There is no guarding. Negative signs include Gibbs's sign.      Hernia: There is no hernia in the ventral area.   Musculoskeletal:         General: Normal range of motion.      Cervical back: Normal range of motion and neck supple.   Lymphadenopathy:      Cervical: No cervical adenopathy.   Skin:     General: Skin is warm and dry.      Findings: No erythema or rash.   Neurological:      Mental Status: He is alert and oriented to person, place, and time.   Psychiatric:         Mood and Affect: Mood normal.         Behavior: Behavior normal.         CLINICAL DATA REVIEWED   reviewed previous lab results and integrated with today's visit, reviewed notes from other physicians and/or last GI encounter, reviewed previous endoscopy results and available photos, reviewed surgical pathology results from previous biopsies    ASSESSMENT  Diagnoses and all orders for this visit:    Personal history of colonic polyps  -     Case Request; Standing  -     Case Request    Anemia, unspecified type  -     Vitamin B12  -     Reticulocytes; Future  -     CBC & Differential  -     Iron Profile  -     Folate RBC; Future    Gastroesophageal reflux disease without esophagitis    Other orders  -     Follow Anesthesia Guidelines / Protocol; Future  -     Verify bowel prep was successful; Standing  -     Give tap water enema if bowel prep was insufficient; Standing  -     Obtain Informed Consent; Standing          PLAN  Return if symptoms worsen or fail to improve.    I have discussed the above plan with the patient.  They verbalize understanding and are in agreement with the plan.  They have been advised to contact the office for any questions, concerns, or changes related to their health.

## 2024-04-12 LAB
FOLATE BLD-MCNC: >620 NG/ML
FOLATE RBC-MCNC: >1501 NG/ML
HCT VFR BLD AUTO: 41.3 % (ref 37.5–51)

## 2024-05-07 ENCOUNTER — ANESTHESIA EVENT (OUTPATIENT)
Dept: PERIOP | Facility: HOSPITAL | Age: 79
End: 2024-05-07
Payer: MEDICARE

## 2024-05-10 ENCOUNTER — ANESTHESIA (OUTPATIENT)
Dept: PERIOP | Facility: HOSPITAL | Age: 79
End: 2024-05-10
Payer: MEDICARE

## 2024-05-10 ENCOUNTER — HOSPITAL ENCOUNTER (OUTPATIENT)
Facility: HOSPITAL | Age: 79
Setting detail: HOSPITAL OUTPATIENT SURGERY
Discharge: HOME | End: 2024-05-10
Attending: INTERNAL MEDICINE | Admitting: INTERNAL MEDICINE
Payer: MEDICARE

## 2024-05-10 VITALS
HEIGHT: 66 IN | OXYGEN SATURATION: 98 % | DIASTOLIC BLOOD PRESSURE: 69 MMHG | WEIGHT: 151.4 LBS | RESPIRATION RATE: 16 BRPM | TEMPERATURE: 97.6 F | BODY MASS INDEX: 24.33 KG/M2 | SYSTOLIC BLOOD PRESSURE: 116 MMHG | HEART RATE: 63 BPM

## 2024-05-10 DIAGNOSIS — Z86.010 PERSONAL HISTORY OF COLONIC POLYPS: ICD-10-CM

## 2024-05-10 PROCEDURE — 45385 COLONOSCOPY W/LESION REMOVAL: CPT | Performed by: INTERNAL MEDICINE

## 2024-05-10 PROCEDURE — 25010000002 PHENYLEPHRINE 10 MG/ML SOLUTION: Performed by: NURSE ANESTHETIST, CERTIFIED REGISTERED

## 2024-05-10 PROCEDURE — 45380 COLONOSCOPY AND BIOPSY: CPT | Performed by: INTERNAL MEDICINE

## 2024-05-10 PROCEDURE — 25810000003 LACTATED RINGERS PER 1000 ML

## 2024-05-10 PROCEDURE — 88305 TISSUE EXAM BY PATHOLOGIST: CPT | Performed by: INTERNAL MEDICINE

## 2024-05-10 PROCEDURE — 25010000002 PROPOFOL 200 MG/20ML EMULSION: Performed by: NURSE ANESTHETIST, CERTIFIED REGISTERED

## 2024-05-10 RX ORDER — ONDANSETRON 2 MG/ML
4 INJECTION INTRAMUSCULAR; INTRAVENOUS ONCE AS NEEDED
Status: DISCONTINUED | OUTPATIENT
Start: 2024-05-10 | End: 2024-05-10 | Stop reason: HOSPADM

## 2024-05-10 RX ORDER — LIDOCAINE HYDROCHLORIDE 10 MG/ML
0.5 INJECTION, SOLUTION INFILTRATION; PERINEURAL ONCE AS NEEDED
Status: DISCONTINUED | OUTPATIENT
Start: 2024-05-10 | End: 2024-05-10 | Stop reason: HOSPADM

## 2024-05-10 RX ORDER — PROPOFOL 10 MG/ML
INJECTION, EMULSION INTRAVENOUS AS NEEDED
Status: DISCONTINUED | OUTPATIENT
Start: 2024-05-10 | End: 2024-05-10 | Stop reason: SURG

## 2024-05-10 RX ORDER — SODIUM CHLORIDE 0.9 % (FLUSH) 0.9 %
10 SYRINGE (ML) INJECTION EVERY 12 HOURS SCHEDULED
Status: DISCONTINUED | OUTPATIENT
Start: 2024-05-10 | End: 2024-05-10 | Stop reason: HOSPADM

## 2024-05-10 RX ORDER — LIDOCAINE HYDROCHLORIDE 20 MG/ML
INJECTION, SOLUTION INFILTRATION; PERINEURAL AS NEEDED
Status: DISCONTINUED | OUTPATIENT
Start: 2024-05-10 | End: 2024-05-10 | Stop reason: SURG

## 2024-05-10 RX ORDER — PHENYLEPHRINE HYDROCHLORIDE 10 MG/ML
INJECTION INTRAVENOUS AS NEEDED
Status: DISCONTINUED | OUTPATIENT
Start: 2024-05-10 | End: 2024-05-10 | Stop reason: SURG

## 2024-05-10 RX ORDER — SODIUM CHLORIDE 9 MG/ML
40 INJECTION, SOLUTION INTRAVENOUS AS NEEDED
Status: DISCONTINUED | OUTPATIENT
Start: 2024-05-10 | End: 2024-05-10 | Stop reason: HOSPADM

## 2024-05-10 RX ORDER — SODIUM CHLORIDE, SODIUM LACTATE, POTASSIUM CHLORIDE, CALCIUM CHLORIDE 600; 310; 30; 20 MG/100ML; MG/100ML; MG/100ML; MG/100ML
9 INJECTION, SOLUTION INTRAVENOUS CONTINUOUS
Status: DISCONTINUED | OUTPATIENT
Start: 2024-05-10 | End: 2024-05-10 | Stop reason: HOSPADM

## 2024-05-10 RX ORDER — SODIUM CHLORIDE 0.9 % (FLUSH) 0.9 %
10 SYRINGE (ML) INJECTION AS NEEDED
Status: DISCONTINUED | OUTPATIENT
Start: 2024-05-10 | End: 2024-05-10 | Stop reason: HOSPADM

## 2024-05-10 RX ORDER — SODIUM CHLORIDE, SODIUM LACTATE, POTASSIUM CHLORIDE, CALCIUM CHLORIDE 600; 310; 30; 20 MG/100ML; MG/100ML; MG/100ML; MG/100ML
100 INJECTION, SOLUTION INTRAVENOUS ONCE
Status: DISCONTINUED | OUTPATIENT
Start: 2024-05-10 | End: 2024-05-10 | Stop reason: HOSPADM

## 2024-05-10 RX ADMIN — PROPOFOL 30 MG: 10 INJECTION, EMULSION INTRAVENOUS at 11:37

## 2024-05-10 RX ADMIN — PROPOFOL 30 MG: 10 INJECTION, EMULSION INTRAVENOUS at 11:32

## 2024-05-10 RX ADMIN — PROPOFOL 30 MG: 10 INJECTION, EMULSION INTRAVENOUS at 11:17

## 2024-05-10 RX ADMIN — PROPOFOL 30 MG: 10 INJECTION, EMULSION INTRAVENOUS at 11:22

## 2024-05-10 RX ADMIN — SODIUM CHLORIDE, POTASSIUM CHLORIDE, SODIUM LACTATE AND CALCIUM CHLORIDE 9 ML/HR: 600; 310; 30; 20 INJECTION, SOLUTION INTRAVENOUS at 10:20

## 2024-05-10 RX ADMIN — PHENYLEPHRINE HYDROCHLORIDE 100 MCG: 10 INJECTION INTRAVENOUS at 11:31

## 2024-05-10 RX ADMIN — PROPOFOL 30 MG: 10 INJECTION, EMULSION INTRAVENOUS at 11:27

## 2024-05-10 RX ADMIN — PROPOFOL 30 MG: 10 INJECTION, EMULSION INTRAVENOUS at 11:19

## 2024-05-10 RX ADMIN — PHENYLEPHRINE HYDROCHLORIDE 100 MCG: 10 INJECTION INTRAVENOUS at 11:37

## 2024-05-10 RX ADMIN — PHENYLEPHRINE HYDROCHLORIDE 100 MCG: 10 INJECTION INTRAVENOUS at 11:34

## 2024-05-10 RX ADMIN — LIDOCAINE HYDROCHLORIDE 100 MG: 20 INJECTION, SOLUTION INFILTRATION; PERINEURAL at 11:17

## 2024-05-10 NOTE — INTERVAL H&P NOTE
"Vital signs  /83 (BP Location: Left arm, Patient Position: Lying)   Pulse 64   Temp 97.6 °F (36.4 °C) (Oral)   Resp 16   Ht 167.6 cm (66\")   Wt 68.7 kg (151 lb 6.4 oz)   SpO2 100%   BMI 24.44 kg/m²     H&P reviewed. The patient was examined and there are no changes to the H&P.        "

## 2024-05-10 NOTE — BRIEF OP NOTE
COLONOSCOPY WITH POLYPECTOMY  Progress Note    Moises Miranda  5/10/2024    Pre-op Diagnosis:   Personal history of colonic polyps [Z86.010]       Post-Op Diagnosis Codes:     * Personal history of colonic polyps [Z86.010]     * Diverticulosis [K57.90]     * Colon polyp [K63.5]    Procedure/CPT® Codes:        Procedure(s):  COLONOSCOPY WITH POLYPECTOMY              Surgeon(s):  Richard Dillon MD    Anesthesia: Monitored Anesthesia Care    Staff:   Circulator: Margo Wooten RN  Scrub Person: Lili Betts         Estimated Blood Loss: none    Urine Voided: * No values recorded between 5/10/2024 11:13 AM and 5/10/2024 11:48 AM *    Specimens:                Specimens       ID Source Type Tests Collected By Collected At Frozen?    A Large Intestine, Sigmoid Colon Polyp TISSUE PATHOLOGY EXAM   Richard Dillon MD 5/10/24 1129 No    Description: sigmoid polyp x 2    This specimen was not marked as sent.    B Large Intestine, Right / Ascending Colon Polyp TISSUE PATHOLOGY EXAM   Richard Dillon MD 5/10/24 1136 No    Description: ascending polyp    This specimen was not marked as sent.    C Large Intestine, Transverse Colon Polyp TISSUE PATHOLOGY EXAM   Richard Dillon MD 5/10/24 1138 No    Description: transverse polyp    This specimen was not marked as sent.                  Drains:   [REMOVED] Urethral Catheter Latex;Other (Comment) 20 Fr. (Removed)       Findings: Colon to Cecum Good prep  Sigmoid Diverticulosis  [id number]        Complications: None          Richard Dillon MD     Date: 5/10/2024  Time: 11:51 EDT

## 2024-05-13 LAB
LAB AP CASE REPORT: NORMAL
PATH REPORT.FINAL DX SPEC: NORMAL
PATH REPORT.GROSS SPEC: NORMAL

## 2024-05-20 ENCOUNTER — OFFICE VISIT (OUTPATIENT)
Dept: ORTHOPEDIC SURGERY | Facility: CLINIC | Age: 79
End: 2024-05-20
Payer: MEDICARE

## 2024-05-20 VITALS
BODY MASS INDEX: 24.59 KG/M2 | HEART RATE: 66 BPM | HEIGHT: 66 IN | DIASTOLIC BLOOD PRESSURE: 77 MMHG | WEIGHT: 153 LBS | SYSTOLIC BLOOD PRESSURE: 136 MMHG

## 2024-05-20 DIAGNOSIS — M79.605 LEFT LEG PAIN: ICD-10-CM

## 2024-05-20 DIAGNOSIS — M17.12 PRIMARY OSTEOARTHRITIS OF LEFT KNEE: Primary | ICD-10-CM

## 2024-05-20 DIAGNOSIS — R22.42 MASS OF LEFT LOWER LEG: ICD-10-CM

## 2024-05-20 RX ADMIN — TRIAMCINOLONE ACETONIDE 80 MG: 40 INJECTION, SUSPENSION INTRA-ARTICULAR; INTRAMUSCULAR at 10:07

## 2024-05-20 RX ADMIN — LIDOCAINE HYDROCHLORIDE 8 ML: 10 INJECTION, SOLUTION EPIDURAL; INFILTRATION; INTRACAUDAL; PERINEURAL at 10:07

## 2024-05-20 NOTE — PROGRESS NOTES
Subjective:     Patient ID: Moises Miranda is a 79 y.o. male.    Chief Complaint:  Mass along left lower leg - new issue to examiner   History of Present Illness  Moises Miranda Presents to clinic for evaluation of his left lower extremity.  For the last 2-3 weeks has noted acute swelling at the mid tib-fib along the lateral aspect of the left lower extremity.  In the past we have aspirated questionable cyst at the knee which did decrease we did end up completing corticosteroid injection which did provide him symptom improvement.  He is experiencing discomfort with palpation along the tib-fib describes pain throbbing right burning in nature rates discomfort a 1 out of 10 swelling in nature positive pain with standing and again with pressure denies any recent corticosteroid injections denies any recent x-ray imaging.  Denies any other concerns present.      Social History     Occupational History    Not on file   Tobacco Use    Smoking status: Former     Types: Cigarettes    Smokeless tobacco: Never    Tobacco comments:     quit 1990   Vaping Use    Vaping status: Never Used   Substance and Sexual Activity    Alcohol use: Yes     Alcohol/week: 3.0 standard drinks of alcohol     Types: 3 Cans of beer per week     Comment: few beers a week    Drug use: No    Sexual activity: Defer      Past Medical History:   Diagnosis Date    Bulbous urethral stricture 9/28/2022    Cancer     melanoma    CHF (congestive heart failure)     GERD (gastroesophageal reflux disease)     Hyperlipidemia     Rheumatoid arthritis      Past Surgical History:   Procedure Laterality Date    CARDIAC DEFIBRILLATOR PLACEMENT      COLONOSCOPY W/ POLYPECTOMY N/A 5/10/2024    Procedure: COLONOSCOPY WITH POLYPECTOMY;  Surgeon: Richard Dillon MD;  Location: [facility];  Service: Gastroenterology;  Laterality: N/A;  diverticulosis; sigmoid polyp x2 (cold snare); ascending polyp x1 (forceps); transverse polyp x1 (cold snare)    CYSTOSCOPY  "URETHROTOMY VISUAL INTERNAL N/A 9/28/2022    Procedure: CYSTOSCOPY WITH INTERNAL VISUAL URETHROTOMY;  Surgeon: Jose E Miranda MD;  Location: [facility];  Service: Urology;  Laterality: N/A;    SKIN CANCER EXCISION  03/15/2013       Family History   Problem Relation Age of Onset    Hypertension Neg Hx                Objective:  Physical Exam    Vital signs reviewed.   General: No acute distress.  Eyes: conjunctiva clear; pupils equally round and reactive  ENT: external ears and nose atraumatic; oropharynx clear  CV: no peripheral edema  Resp: normal respiratory effort  Skin: no rashes or wounds; normal turgor  Psych: mood and affect appropriate; recent and remote memory intact    Vitals:    05/20/24 0939   BP: 136/77   Pulse: 66   Weight: 69.4 kg (153 lb)   Height: 167.6 cm (66\")         05/20/24  0939   Weight: 69.4 kg (153 lb)     Body mass index is 24.69 kg/m².      Ortho Exam       Left lower extremity exam  Knee range of motion 3 degrees to 120 degrees stable varus valgus stress at 30 degrees and 30 degrees moderate swelling left knee palpable swelling along the mid fibula appears to be proximal to mid aspect of the tib-fib along the posterior joint line as well positive sensation light touch all distributions left lower extremity  Negative calf tenderness, negative Homans' sign  Positive active patellar compression test  Positive crepitus to arc of motion  Imaging:  Left tib/fib X-Ray  Indication: Pain    AP, Lateral, and [place] views    Findings:  No fracture  No bony lesion  Normal soft tissues  Advance tricompartmental osteoarthritis with bone-on-bone articulation all 3 compartments with varus deformity, soft tissue swelling proximal to mid aspect of the tib-fib laterally    No prior studies were available for comparison.    Assessment:        1. Left leg pain    2. Primary osteoarthritis of left knee    3. Mass of left lower leg           Plan:  Large Joint Arthrocentesis: L knee  Date/Time: " 5/20/2024 10:07 AM  Consent given by: patient  Site marked: site marked  Timeout: Immediately prior to procedure a time out was called to verify the correct patient, procedure, equipment, support staff and site/side marked as required   Supporting Documentation  Indications: pain   Procedure Details  Location: knee - L knee  Preparation: Patient was prepped and draped in the usual sterile fashion  Needle size: 22 G  Approach: anterolateral  Medications administered: 80 mg triamcinolone acetonide 40 MG/ML; 8 mL lidocaine PF 1% 1 %  Patient tolerance: patient tolerated the procedure well with no immediate complications        1.  Discussed with of care with patient.  Discussed we will proceed with corticosteroid injection.  In the next week if he is not improving we can proceed with MRI with and without contrast of the tib-fib.  Again I do have suspicion this is related to osteoarthritis with his prior history which was significantly improved with corticosteroid injection.  Again if not we can proceed with MRI.  Encouraged to call with any questions or concerns.  All questions answered.  Orders:  Orders Placed This Encounter   Procedures    Large Joint Arthrocentesis: L knee    XR Tibia Fibula 2 View Left     No orders of the defined types were placed in this encounter.          I ordered and reviewed the VIET today.       Dragon dictation utilized

## 2024-05-21 RX ORDER — TRIAMCINOLONE ACETONIDE 40 MG/ML
80 INJECTION, SUSPENSION INTRA-ARTICULAR; INTRAMUSCULAR
Status: COMPLETED | OUTPATIENT
Start: 2024-05-20 | End: 2024-05-20

## 2024-05-21 RX ORDER — LIDOCAINE HYDROCHLORIDE 10 MG/ML
8 INJECTION, SOLUTION EPIDURAL; INFILTRATION; INTRACAUDAL; PERINEURAL
Status: COMPLETED | OUTPATIENT
Start: 2024-05-20 | End: 2024-05-20

## 2024-06-18 ENCOUNTER — OFFICE VISIT (OUTPATIENT)
Dept: ORTHOPEDIC SURGERY | Facility: CLINIC | Age: 79
End: 2024-06-18
Payer: MEDICARE

## 2024-06-18 VITALS — HEIGHT: 66 IN | BODY MASS INDEX: 24.59 KG/M2 | WEIGHT: 153 LBS

## 2024-06-18 DIAGNOSIS — M17.12 PRIMARY OSTEOARTHRITIS OF LEFT KNEE: ICD-10-CM

## 2024-06-18 DIAGNOSIS — M79.605 LEFT LEG PAIN: ICD-10-CM

## 2024-06-18 DIAGNOSIS — R22.42 MASS OF LEFT LOWER LEG: Primary | ICD-10-CM

## 2024-06-18 PROCEDURE — 99214 OFFICE O/P EST MOD 30 MIN: CPT | Performed by: NURSE PRACTITIONER

## 2024-06-18 RX ORDER — PREDNISONE 10 MG/1
TABLET ORAL
Qty: 39 TABLET | Refills: 0 | Status: SHIPPED | OUTPATIENT
Start: 2024-06-18

## 2024-06-18 RX ORDER — FUROSEMIDE 20 MG/1
TABLET ORAL
COMMUNITY
Start: 2024-05-30

## 2024-06-18 NOTE — PROGRESS NOTES
Subjective:     Patient ID: Moises Miranda is a 79 y.o. male.    Chief Complaint:  Follow-up left knee DJD   Corticosteroid injection left knee   History of Present Illness  History of Present Illness     Moises Miranda Returns to clinic today for follow-up of his left lower extremity.  He has been seen by dermatology who did take biopsy.  He is leaking fluid from the mid tib-fib clear yellow joint fluid.  He was contacted with results however unsure of specific terminology.  He does take prednisone 5 mg daily.  He did receive corticosteroid injection last visit without any significant symptom improvement fluid has continued.  The swelling at the lower extremity does appear to be improved.  He is ambulating weightbearing as tolerated.  No evidence of any erythema.  He has continued covering with bandage.  Denies any other concerns present.    Social History     Occupational History    Not on file   Tobacco Use    Smoking status: Former     Types: Cigarettes    Smokeless tobacco: Never    Tobacco comments:     quit 1990   Vaping Use    Vaping status: Never Used   Substance and Sexual Activity    Alcohol use: Yes     Alcohol/week: 3.0 standard drinks of alcohol     Types: 3 Cans of beer per week     Comment: few beers a week    Drug use: No    Sexual activity: Defer      Past Medical History:   Diagnosis Date    Bulbous urethral stricture 9/28/2022    Cancer     melanoma    CHF (congestive heart failure)     GERD (gastroesophageal reflux disease)     Hyperlipidemia     Rheumatoid arthritis      Past Surgical History:   Procedure Laterality Date    CARDIAC DEFIBRILLATOR PLACEMENT      COLONOSCOPY W/ POLYPECTOMY N/A 5/10/2024    Procedure: COLONOSCOPY WITH POLYPECTOMY;  Surgeon: Richard Dillon MD;  Location: [facility];  Service: Gastroenterology;  Laterality: N/A;  diverticulosis; sigmoid polyp x2 (cold snare); ascending polyp x1 (forceps); transverse polyp x1 (cold snare)    CYSTOSCOPY URETHROTOMY VISUAL  "INTERNAL N/A 9/28/2022    Procedure: CYSTOSCOPY WITH INTERNAL VISUAL URETHROTOMY;  Surgeon: Jose E Miranda MD;  Location: [facility];  Service: Urology;  Laterality: N/A;    SKIN CANCER EXCISION  03/15/2013       Family History   Problem Relation Age of Onset    Hypertension Neg Hx                Objective:  Physical Exam  General: No acute distress.  Eyes: conjunctiva clear; pupils equally round and reactive  ENT: external ears and nose atraumatic; oropharynx clear  CV: no peripheral edema  Resp: normal respiratory effort  Skin: no rashes or wounds; normal turgor  Psych: mood and affect appropriate; recent and remote memory intact    Vitals:    06/18/24 1049   Weight: 69.4 kg (153 lb)   Height: 167.6 cm (66\")         06/18/24  1049   Weight: 69.4 kg (153 lb)     Body mass index is 24.69 kg/m².      Ortho Exam     Physical Exam  Left lower extremity examined  Pinpoint wound to the lateral aspect of the tib-fib, proximal to mid with serous joint fluid draining, palpable swelling that was previously present significantly improved all compartments soft is a compressible  Negative calf tenderness, negative Homans' sign  Negative erythema  Positive crepitus throughout arc of motion left knee  Left knee range of motion 3 degrees to 120 degrees  Stable to varus and valgus stress at 3 degrees and 30 degrees  Moderate swelling, mild effusion  Positive active patellar compression test  Positive crepitus throughout arc of motion    Assessment:        1. Mass of left lower leg    2. Left leg pain    3. Primary osteoarthritis of left knee         Assessment & Plan      Plan:    1.  Discussed plan of care with patient.  We did proceed last visit with corticosteroid injection as this has been present in the past where he gets a fluid collection done of the tib-fib due to the underlying arthritis.  He is leaking the joint fluid at the left lower extremity but we will proceed with MRI to evaluate soft tissue.  We will start " oral prednisone taper discussed to hold off on his prednisone that he is currently prescribed for now.  I will call him with results and further plan of care.  Continue to keep the wound covered.  All questions answered.  Orders:  Orders Placed This Encounter   Procedures    MRI Tibia Fibula Left Without Contrast     New Medications Ordered This Visit   Medications    predniSONE (DELTASONE) 10 MG tablet     Si mg daily x 3 days, 40 mg daily x 3 days, 20 mg daily x 3 days, 10 mg daily x 3 days     Dispense:  39 tablet     Refill:  0         Dragon dictation utilized

## 2024-07-03 ENCOUNTER — OFFICE VISIT (OUTPATIENT)
Dept: INTERNAL MEDICINE | Facility: CLINIC | Age: 79
End: 2024-07-03
Payer: MEDICARE

## 2024-07-03 VITALS
OXYGEN SATURATION: 100 % | WEIGHT: 149.8 LBS | HEART RATE: 69 BPM | BODY MASS INDEX: 24.08 KG/M2 | DIASTOLIC BLOOD PRESSURE: 66 MMHG | HEIGHT: 66 IN | TEMPERATURE: 98.6 F | SYSTOLIC BLOOD PRESSURE: 106 MMHG

## 2024-07-03 DIAGNOSIS — Z00.00 MEDICARE ANNUAL WELLNESS VISIT, SUBSEQUENT: Primary | ICD-10-CM

## 2024-07-03 DIAGNOSIS — I71.43 INFRARENAL ABDOMINAL AORTIC ANEURYSM (AAA) WITHOUT RUPTURE: ICD-10-CM

## 2024-07-03 PROCEDURE — G0439 PPPS, SUBSEQ VISIT: HCPCS | Performed by: STUDENT IN AN ORGANIZED HEALTH CARE EDUCATION/TRAINING PROGRAM

## 2024-07-03 PROCEDURE — 1126F AMNT PAIN NOTED NONE PRSNT: CPT | Performed by: STUDENT IN AN ORGANIZED HEALTH CARE EDUCATION/TRAINING PROGRAM

## 2024-07-03 PROCEDURE — 1159F MED LIST DOCD IN RCRD: CPT | Performed by: STUDENT IN AN ORGANIZED HEALTH CARE EDUCATION/TRAINING PROGRAM

## 2024-07-03 PROCEDURE — 1160F RVW MEDS BY RX/DR IN RCRD: CPT | Performed by: STUDENT IN AN ORGANIZED HEALTH CARE EDUCATION/TRAINING PROGRAM

## 2024-07-03 NOTE — PROGRESS NOTES
The ABCs of the Annual Wellness Visit  Subsequent Medicare Wellness Visit    Subjective    Moises Miranda is a 79 y.o. male who presents for a Subsequent Medicare Wellness Visit.    Moises is an established patient presenting for subsequent Medicare wellness visit.  He has CAD, HFrEF, NSVT with  BiV-ICD pacer, hypercholesterolemia, GERD, AAA, RA, spinal stenosis, prediabetes, osteoporosis, history of prostate cancer s/p radiation, history of melanoma, history of kidney stones.  Also follows with JF Wilson, rheumatology, Jose E Miranda MD, urology, Janae Tinoco MD, dermatology, Julien Shanks MD, electrophysiology, Nehemias Ortiz MD, cardiology, Prosper Abdul MD, Cardiology, Richard Mishra MD, pain management, has referral to Ru Tello MD, vascular surgery for AAA (never connected, new referral today),  JF Veras, Ortho, Richard Dillon MD, gastroenterology.     The patient expresses a desire to regain his weight, currently weighing 150 pounds. He recalls weighing 175 pounds when he retired, but currently weighs 160 pounds.  He attributes his weight loss to dietary changes. He maintains an active lifestyle, including fishing and hunting.  He sees his dermatologist every 3 months and his rheumatologist every 3 months.    The following portions of the patient's history were reviewed and   updated as appropriate: allergies, current medications, past family history, past medical history, past social history, past surgical history, and problem list.    Compared to one year ago, the patient feels his physical   health is the same.    Compared to one year ago, the patient feels his mental   health is the same.    Recent Hospitalizations:  He was not admitted to the hospital during the last year.       Current Medical Providers:  Patient Care Team:  Jama Powers MD as PCP - General (Family Medicine)  JF Wilson, rheumatology  Jose E Miranda MD, urology  Janae Tinoco MD,  dermatology  Julien Shanks MD, electrophysiology  Nehemias Ortiz MD, cardiology,   Prosper Abdul MD, Cardiology  Richard Mishra MD, Pain management  Ru Tello MD, vascular surgery for AAA (never connected, new referral today)  JF Veras, Ortho  Richard Dillon MD, gastroenterology    Outpatient Medications Prior to Visit   Medication Sig Dispense Refill    alendronate (FOSAMAX) 70 MG tablet Take 1 tablet by mouth Every 7 (Seven) Days.      aspirin 81 MG EC tablet 1 tablet Daily.      atorvastatin (LIPITOR) 20 MG tablet Take 1 tablet by mouth.      carvedilol (COREG) 12.5 MG tablet TAKE 1/2 TABLET TWICE DAILY WITH MEALS      fluticasone (FLONASE) 50 MCG/ACT nasal spray 2 sprays into the nostril(s) as directed by provider Daily. 16 g 0    folic acid (FOLVITE) 1 MG tablet Take 1 tablet by mouth 3 (Three) Times a Day.      furosemide (LASIX) 20 MG tablet       loratadine (CLARITIN) 10 MG tablet Take 1 tablet by mouth Daily. 30 tablet 0    methotrexate 2.5 MG tablet       omeprazole (priLOSEC) 20 MG capsule Take 1 capsule by mouth Daily. 90 capsule 3    Pediatric Multivitamins-Iron (multivitamin chewable) chewable tablet 1 tablet Daily.      predniSONE (DELTASONE) 5 MG tablet Take 1 tablet by mouth Daily.      sacubitril-valsartan (Entresto) 24-26 MG tablet Take 1 tablet by mouth 2 (Two) Times a Day. 60 tablet 3    spironolactone (ALDACTONE) 25 MG tablet       tamsulosin (FLOMAX) 0.4 MG capsule 24 hr capsule Take 1 capsule by mouth Daily.      furosemide (LASIX) 40 MG tablet 1/2 tab PO q daily (Patient not taking: Reported on 7/3/2024)      predniSONE (DELTASONE) 10 MG tablet 60 mg daily x 3 days, 40 mg daily x 3 days, 20 mg daily x 3 days, 10 mg daily x 3 days (Patient not taking: Reported on 7/3/2024) 39 tablet 0     No facility-administered medications prior to visit.       No opioid medication identified on active medication list. I have reviewed chart for other potential  high risk medication/s and  "harmful drug interactions in the elderly.        Aspirin is on active medication list. Aspirin use is indicated based on review of current medical condition/s. Pros and cons of this therapy have been discussed today. Benefits of this medication outweigh potential harm.  Patient has been encouraged to continue taking this medication.  .      Patient Active Problem List   Diagnosis    Pure hypercholesterolemia    Gastroesophageal reflux disease without esophagitis    Rheumatoid arthritis    Prostate cancer    Mass of soft tissue of knee    Osteoarthritis of left knee    Bulbous urethral stricture    Chronic systolic congestive heart failure    Coronary artery disease involving native coronary artery of native heart without angina pectoris    NSVT (nonsustained ventricular tachycardia)    Biventricular ICD (implantable cardioverter-defibrillator) in place    Infrarenal abdominal aortic aneurysm (AAA) without rupture    Spinal stenosis of lumbar region without neurogenic claudication    Prediabetes    History of melanoma    History of nephrolithiasis    Anemia    Personal history of colonic polyps     Advance Care Planning   Advance Care Planning     Advance Directive is not on file.  ACP discussion was held with the patient during this visit. Patient does not have an advance directive, information provided.     Objective    Vitals:    07/03/24 0857   BP: 106/66   Pulse: 69   Temp: 98.6 °F (37 °C)   SpO2: 100%   Weight: 67.9 kg (149 lb 12.8 oz)   Height: 167.6 cm (66\")     Estimated body mass index is 24.18 kg/m² as calculated from the following:    Height as of this encounter: 167.6 cm (66\").    Weight as of this encounter: 67.9 kg (149 lb 12.8 oz).    BMI is within normal parameters. No other follow-up for BMI required.      Does the patient have evidence of cognitive impairment? No          HEALTH RISK ASSESSMENT    Smoking Status:  Social History     Tobacco Use   Smoking Status Former    Current packs/day: 0.00    " Average packs/day: 1 pack/day for 15.0 years (15.0 ttl pk-yrs)    Types: Cigarettes    Quit date: 10/19/1990    Years since quittin.7   Smokeless Tobacco Never   Tobacco Comments    quit      Alcohol Consumption:  Social History     Substance and Sexual Activity   Alcohol Use Not Currently    Alcohol/week: 3.0 standard drinks of alcohol    Types: 3 Cans of beer per week    Comment: few beers a week     Fall Risk Screen:    SHAYLA Fall Risk Assessment was completed, and patient is at LOW risk for falls.Assessment completed on:7/3/2024    Depression Screenin/3/2024     8:56 AM   PHQ-2/PHQ-9 Depression Screening   Little Interest or Pleasure in Doing Things 0-->not at all   Feeling Down, Depressed or Hopeless 0-->not at all   PHQ-9: Brief Depression Severity Measure Score 0       Health Habits and Functional and Cognitive Screenin/26/2024     2:47 PM   Functional & Cognitive Status   Do you have difficulty preparing food and eating? No   Do you have difficulty bathing yourself, getting dressed or grooming yourself? No   Do you have difficulty using the toilet? No   Do you have difficulty moving around from place to place? No   Do you have trouble with steps or getting out of a bed or a chair? No   Current Diet Well Balanced Diet   Dental Exam Other   Eye Exam Up to date   Exercise (times per week) 5 times per week   Current Exercises Include Walking   Do you need help using the phone?  Yes   Are you deaf or do you have serious difficulty hearing?  Yes   Do you need help to go to places out of walking distance? No   Do you need help shopping? No   Do you need help preparing meals?  No   Do you need help with housework?  No   Do you need help with laundry? No   Do you need help taking your medications? No   Do you need help managing money? No   Do you ever drive or ride in a car without wearing a seat belt? No   Have you felt unusual stress, anger or loneliness in the last month? No   Who do  you live with? Spouse   If you need help, do you have trouble finding someone available to you? No   Have you been bothered in the last four weeks by sexual problems? No   Do you have difficulty concentrating, remembering or making decisions? No       Age-appropriate Screening Schedule:  Refer to the list below for future screening recommendations based on patient's age, sex and/or medical conditions. Orders for these recommended tests are listed in the plan section. The patient has been provided with a written plan.    Health Maintenance   Topic Date Due    COVID-19 Vaccine (6 - 2023-24 season) 06/29/2024    INFLUENZA VACCINE  08/01/2024    LIPID PANEL  03/14/2025    ANNUAL WELLNESS VISIT  07/03/2025    COLORECTAL CANCER SCREENING  05/10/2027    TDAP/TD VACCINES (2 - Td or Tdap) 03/03/2032    HEPATITIS C SCREENING  Completed    RSV Vaccine - Adults  Completed    Pneumococcal Vaccine 65+  Completed    ZOSTER VACCINE  Completed                Physical Exam  Vitals and nursing note reviewed.   Constitutional:       General: He is not in acute distress.     Appearance: Normal appearance.   HENT:      Head: Normocephalic and atraumatic.   Cardiovascular:      Rate and Rhythm: Normal rate and regular rhythm.      Heart sounds: Normal heart sounds. No murmur heard.     No friction rub. No gallop.   Pulmonary:      Effort: Pulmonary effort is normal.      Breath sounds: Normal breath sounds. No wheezing, rhonchi or rales.   Abdominal:      General: There is no distension.      Palpations: Abdomen is soft.      Tenderness: There is no abdominal tenderness. There is no left CVA tenderness or guarding.   Musculoskeletal:      Right lower leg: No edema.      Left lower leg: No edema.   Skin:     General: Skin is warm and dry.   Neurological:      General: No focal deficit present.      Mental Status: He is alert. Mental status is at baseline.   Psychiatric:         Mood and Affect: Mood normal.         Behavior: Behavior  normal.           Assessment and Plan   Diagnoses and all orders for this visit:    1. Medicare annual wellness visit, subsequent (Primary)    2. Infrarenal abdominal aortic aneurysm (AAA) without rupture  Comments:  Identified on 4/20/2023 screen.  Orders:  -     Ambulatory Referral to Vascular Surgery      Assessment & Plan  1. Medicare wellness.  The patient's weight is within the healthy range.  The patient was encouraged to maintain a healthy diet, limit snacks and sugary drinks, and maintain an active lifestyle.    2. Urinary incontinence.  The patient was advised to perform Kegel exercises as discussed in AVS.    3. Aneurysm.  A new referral will be made to Vascular for further evaluation.    FH:     Medical: Deferred    Siblings: Deferred    Children: 2 daughters     Reproductive: Deferred.      Social/Preventative:     Risks: CAD, HFrEF    AAA screen: 4/20/2023, referred to vascular surgery for AAA management.  Again referred today since he had not connected with vascular surgery.    PSA: Followed by urology    Colonoscopy: 5/10/2024, polyps, diverticula.  Repeat in 3 years.    Vaccinations: COVID, influenza, RSV this season, PPSV23 2017, PCV13 3/2022, tetanus 3/2022    Foot exams: N/A    Nicotine: Quit 1990, started 1961, 29PY    Illicit drugs: None    EtOH: 2/week    Home: Lives at home with wife, feels safe    Work:  Working deferred.    Social: None currently, feels like he should get back to Religious.    Exercise: Active outdoors, does a lot of fishing during fishing season.    Diet: Reports overall healthy diet.       CMS Preventative Services Quick Reference  Risk Factors Identified During Encounter  Urinary Incontinence: Kegel exercises discussed  The above risks/problems have been discussed with the patient.  Pertinent information has been shared with the patient in the After Visit Summary.  An After Visit Summary and PPPS were made available to the patient.    Follow Up:   Next Medicare Wellness  visit to be scheduled in 1 year.       Follow Up   Return in about 3 months (around 9/23/2024) for Follow-up, labs.  Patient was given instructions and counseling regarding his condition or for health maintenance advice. Please see specific information pulled into the AVS if appropriate.     Patient or patient representative verbalized consent for the use of Ambient Listening during the visit with  Jama Powers MD for chart documentation. 7/3/2024  18:14 EDT

## 2024-07-03 NOTE — PATIENT INSTRUCTIONS
Call pharmacy about prednisone prescription from 6/18/2024.  If not there, call JF Veras.    Do Chandlers:   -10x/day for 1st week   -10x two times a day for 2nd weei   -10xthree times a day for 3nd week   -gradually work up to 30xthree times a day

## 2024-07-30 ENCOUNTER — HOSPITAL ENCOUNTER (OUTPATIENT)
Dept: MRI IMAGING | Facility: HOSPITAL | Age: 79
Discharge: HOME | End: 2024-07-30
Admitting: NURSE PRACTITIONER
Payer: MEDICARE

## 2024-07-30 VITALS
HEART RATE: 83 BPM | DIASTOLIC BLOOD PRESSURE: 67 MMHG | RESPIRATION RATE: 18 BRPM | SYSTOLIC BLOOD PRESSURE: 105 MMHG | BODY MASS INDEX: 24.11 KG/M2 | OXYGEN SATURATION: 94 % | HEIGHT: 66 IN | WEIGHT: 150 LBS

## 2024-07-30 DIAGNOSIS — M79.605 LEFT LEG PAIN: ICD-10-CM

## 2024-07-30 DIAGNOSIS — M17.12 PRIMARY OSTEOARTHRITIS OF LEFT KNEE: ICD-10-CM

## 2024-07-30 DIAGNOSIS — R22.42 MASS OF LEFT LOWER LEG: ICD-10-CM

## 2024-07-30 PROCEDURE — A9577 INJ MULTIHANCE: HCPCS | Performed by: NURSE PRACTITIONER

## 2024-07-30 PROCEDURE — 73720 MRI LWR EXTREMITY W/O&W/DYE: CPT

## 2024-07-30 PROCEDURE — 0 GADOBENATE DIMEGLUMINE 529 MG/ML SOLUTION: Performed by: NURSE PRACTITIONER

## 2024-07-30 RX ADMIN — GADOBENATE DIMEGLUMINE 14 ML: 529 INJECTION, SOLUTION INTRAVENOUS at 13:31

## 2024-08-14 ENCOUNTER — OFFICE VISIT (OUTPATIENT)
Dept: ORTHOPEDIC SURGERY | Facility: CLINIC | Age: 79
End: 2024-08-14
Payer: MEDICARE

## 2024-08-14 VITALS — WEIGHT: 150 LBS | HEIGHT: 66 IN | BODY MASS INDEX: 24.11 KG/M2

## 2024-08-14 DIAGNOSIS — M17.12 PRIMARY OSTEOARTHRITIS OF LEFT KNEE: Primary | ICD-10-CM

## 2024-08-14 RX ADMIN — LIDOCAINE HYDROCHLORIDE 8 ML: 10 INJECTION, SOLUTION EPIDURAL; INFILTRATION; INTRACAUDAL; PERINEURAL at 09:15

## 2024-08-14 RX ADMIN — TRIAMCINOLONE ACETONIDE 80 MG: 40 INJECTION, SUSPENSION INTRA-ARTICULAR; INTRAMUSCULAR at 09:15

## 2024-08-14 NOTE — PROGRESS NOTES
Subjective:     Patient ID: Moises Miranda is a 79 y.o. male.    Chief Complaint:  Follow-up DJD left knee, cystic drainage along the proximal fibula  MRI completed left tib-fib  History of Present Illness  History of Present Illness  The patient returns to clinic today for follow-up of his left lower extremity. He has completed MRI, returns to discuss results and further plan of care.    Persistent swelling is noted at the proximal fibula, although the drainage has significantly reduced following the punch biopsy, indicating good healing and closure. He reports mild tenderness in his left knee. Previous corticosteroid injections have been beneficial. He maintains his daily activities and hobbies such as hunting and fishing without any issues. He denies experiencing any locking, catching, or instability in the knee and has no other concerns.       Social History     Occupational History    Not on file   Tobacco Use    Smoking status: Former     Current packs/day: 0.00     Average packs/day: 1 pack/day for 15.0 years (15.0 ttl pk-yrs)     Types: Cigarettes     Quit date: 10/19/1990     Years since quittin.8    Smokeless tobacco: Never    Tobacco comments:     quit    Vaping Use    Vaping status: Never Used   Substance and Sexual Activity    Alcohol use: Not Currently     Alcohol/week: 3.0 standard drinks of alcohol     Types: 3 Cans of beer per week     Comment: few beers a week    Drug use: No    Sexual activity: Not Currently     Partners: Female      Past Medical History:   Diagnosis Date    Allergic     Anxiety     Benign prostatic hyperplasia     Bulbous urethral stricture 2022    Cancer     melanoma    Cataract     CHF (congestive heart failure)     Cholelithiasis     Colon polyp     Depression     Diverticulosis     Erectile dysfunction     GERD (gastroesophageal reflux disease)     HL (hearing loss)     Hyperlipidemia     Hypertension     Kidney stone     Low back pain     Rheumatoid arthritis   "   Visual impairment      Past Surgical History:   Procedure Laterality Date    CARDIAC DEFIBRILLATOR PLACEMENT      COLONOSCOPY  04 25    5 polops    COLONOSCOPY W/ POLYPECTOMY N/A 05/10/2024    Procedure: COLONOSCOPY WITH POLYPECTOMY;  Surgeon: Richard Dillon MD;  Location: [facility];  Service: Gastroenterology;  Laterality: N/A;  diverticulosis; sigmoid polyp x2 (cold snare); ascending polyp x1 (forceps); transverse polyp x1 (cold snare)    CYSTOSCOPY URETHROTOMY VISUAL INTERNAL N/A 09/28/2022    Procedure: CYSTOSCOPY WITH INTERNAL VISUAL URETHROTOMY;  Surgeon: Jose E Miranda MD;  Location: [facility] OR;  Service: Urology;  Laterality: N/A;    EYE SURGERY      PROSTATE SURGERY      SKIN CANCER EXCISION  03/15/2013       Family History   Problem Relation Age of Onset    Alcohol abuse Maternal Grandmother     Cancer Brother     Hearing loss Brother     Hypertension Neg Hx                Objective:  Physical Exam    General: No acute distress.  Eyes: conjunctiva clear; pupils equally round and reactive  ENT: external ears and nose atraumatic; oropharynx clear  CV: no peripheral edema  Resp: normal respiratory effort  Skin: no rashes or wounds; normal turgor  Psych: mood and affect appropriate; recent and remote memory intact    Vitals:    08/14/24 0904   Weight: 68 kg (150 lb)   Height: 167.6 cm (66\")         08/14/24  0904   Weight: 68 kg (150 lb)     Body mass index is 24.21 kg/m².      Ortho Exam     Physical Exam  Left lower extremity examined  Cystic structure along the proximal fibula appears to be healed very minimal drainage serous drainage but very minimal  Knee range of motion 3 degrees to 95 degrees  Stable to varus and valgus stress at 3 degrees and 30 degrees  1+ anterior Lachman exam  Positive tenderness along the medial compartment, patella  Positive active patellar compression test    Imaging:  MRI Tibia Fibula Left With & Without Contrast    Result Date: 7/30/2024   1. Nonspecific " acute lobulated cystic lesion in the fascial planes between the cutaneous fat and underlying musculature at the lateral proximal lower leg area of clinical concern with minimal thin peripheral enhancement. There may be thin multilobulated extension proximally through the subcutaneous fat to the level of the fibular head. Nonspecific patchy adjacent soft tissue edema. No nodular or masslike enhancement. This could represent a benign ganglion or possibly a seroma or hematoma that could be the result of a Alexandra-Dionne fascial degloving injury. Correlate with prior history. 2. Incompletely evaluated tricompartmental DJD of the knee, most severe in the medial compartment, with chronic appearing subchondral insufficiency type fracture deformity of the weightbearing portions of the medial femoral condyle and medial tibial plateau. 3. Mild edema in the medial and lateral head gastrocnemius muscles and trace fluid between the medial head gastrocnemius muscle and the soleus muscle. Findings could represent mild muscle strain and partial-thickness myofascial tear with possible plantaris tendon tear.  This report was finalized on 7/30/2024 6:12 PM by Sandeep Crenshaw MD on Workstation: [id number]      Dependently reviewed MRI left tib-fib nonspecific acute lobulated cystic lesion along the fascial planes between cutaneous fat and underlying musculature at lateral proximal lower leg area.  Multilobulated extension proximally through subcu fat and level fibular head.  Nonspecific patchy adjacent soft tissue edema.  No nodular or masslike enhancement.  This is representing benign ganglion cyst or seroma or hematoma.  Chronic appearing subchondral insufficiency type fracture deformity along the weightbearing portions of the medial femoral condyle medial tibial plateau.  Mild edema in the medial and lateral head of the gastrocnemius muscle and trace fluid between the medial head gastrocnemius muscle and psoas muscle.          Assessment:      No diagnosis found.     Assessment & Plan  1. Left lower extremity swelling.  The swelling at the proximal fibula continues to be present. The drainage has slowed significantly after the punch biopsy and is healing well. Mild tenderness at the left knee is noted. He has previously received corticosteroid injections with good results and wishes to proceed with another injection. The corticosteroid injection is recommended to treat the arthritic component causing fluid tracing down to the soft tissue. He is unable to proceed with total knee arthroplasty at this time due to being the primary caregiver for his spouse. All questions were answered.    Follow-up  He will follow up in 3 months or as needed.      Large Joint Arthrocentesis: L knee  Date/Time: 8/14/2024 9:15 AM  Consent given by: patient  Site marked: site marked  Timeout: Immediately prior to procedure a time out was called to verify the correct patient, procedure, equipment, support staff and site/side marked as required   Supporting Documentation  Indications: pain   Procedure Details  Location: knee - L knee  Preparation: Patient was prepped and draped in the usual sterile fashion  Needle size: 22 G  Approach: anterolateral  Medications administered: 8 mL lidocaine PF 1% 1 %; 80 mg triamcinolone acetonide 40 MG/ML  Patient tolerance: patient tolerated the procedure well with no immediate complications        Orders:  No orders of the defined types were placed in this encounter.    No orders of the defined types were placed in this encounter.            Dragon dictation utilized  BMI is within normal parameters. No other follow-up for BMI required.       Patient or patient representative verbalized consent for the use of Ambient Listening during the visit with  JF Pinto for chart documentation. 8/15/2024  07:19 EDT

## 2024-08-15 ENCOUNTER — OFFICE VISIT (OUTPATIENT)
Dept: INTERNAL MEDICINE | Facility: CLINIC | Age: 79
End: 2024-08-15
Payer: MEDICARE

## 2024-08-15 VITALS
SYSTOLIC BLOOD PRESSURE: 110 MMHG | OXYGEN SATURATION: 98 % | HEIGHT: 66 IN | WEIGHT: 151.2 LBS | TEMPERATURE: 98.4 F | HEART RATE: 54 BPM | BODY MASS INDEX: 24.3 KG/M2 | DIASTOLIC BLOOD PRESSURE: 72 MMHG

## 2024-08-15 DIAGNOSIS — B00.1 FEVER BLISTER: Primary | ICD-10-CM

## 2024-08-15 PROCEDURE — 99213 OFFICE O/P EST LOW 20 MIN: CPT | Performed by: STUDENT IN AN ORGANIZED HEALTH CARE EDUCATION/TRAINING PROGRAM

## 2024-08-15 PROCEDURE — 1125F AMNT PAIN NOTED PAIN PRSNT: CPT | Performed by: STUDENT IN AN ORGANIZED HEALTH CARE EDUCATION/TRAINING PROGRAM

## 2024-08-15 PROCEDURE — 1160F RVW MEDS BY RX/DR IN RCRD: CPT | Performed by: STUDENT IN AN ORGANIZED HEALTH CARE EDUCATION/TRAINING PROGRAM

## 2024-08-15 PROCEDURE — 1159F MED LIST DOCD IN RCRD: CPT | Performed by: STUDENT IN AN ORGANIZED HEALTH CARE EDUCATION/TRAINING PROGRAM

## 2024-08-15 RX ORDER — LIDOCAINE HYDROCHLORIDE 10 MG/ML
8 INJECTION, SOLUTION EPIDURAL; INFILTRATION; INTRACAUDAL; PERINEURAL
Status: COMPLETED | OUTPATIENT
Start: 2024-08-14 | End: 2024-08-14

## 2024-08-15 RX ORDER — TRIAMCINOLONE ACETONIDE 40 MG/ML
80 INJECTION, SUSPENSION INTRA-ARTICULAR; INTRAMUSCULAR
Status: COMPLETED | OUTPATIENT
Start: 2024-08-14 | End: 2024-08-14

## 2024-08-15 RX ORDER — VALACYCLOVIR HYDROCHLORIDE 1 G/1
TABLET, FILM COATED ORAL
Qty: 4 TABLET | Refills: 0 | Status: SHIPPED | OUTPATIENT
Start: 2024-08-15

## 2024-08-15 NOTE — PROGRESS NOTES
Chief Complaint  Mouth lesions    Subjective        Moises Miranda presents to [facility]    History of Present Illness  Moises is an established patient presenting with mouth lesions for the past 10 days..  He has CAD, HFrEF, NSVT with  BiV-ICD pacer, hypercholesterolemia, GERD, AAA, RA, spinal stenosis, prediabetes, osteoporosis, history of prostate cancer s/p radiation, history of melanoma, history of kidney stones.  Also follows with JF Wilson, rheumatology, Jose E Miranda MD, urology, Janae Tinoco MD, dermatology, Julien Shanks MD, electrophysiology, Nehemias Ortiz MD, cardiology, Prosper Abdul MD, Cardiology, Richard Mishra MD, pain management, has referral to Ru Tello MD, vascular surgery for AAA (never connected, new referral today),  JF Veras, Ortho, Richard Dillon MD, gastroenterology.     He reports a lesion on his lip, the cause of which is uncertain but he suspects it may be stress-related. Despite using Abreva and lip balm, he has not observed any improvement. Additionally, he mentions a sore spot on the right side of his lip. He is currently on prednisone. He has regular dermatology appointments every 3 months due to a history of melanoma.    He expresses frustration and seeks advice on how to manage his stress. He declines the suggestion of counseling.    He mentions that a nurse practitioner suggested a full knee replacement, which he declined as he is responsible for all household chores and believes the timing is not right. He has limited options for home help. He spends most of his spare time fishing and is considering joining a [employer] group.    He requests an examination of his ears for possible wax buildup. He has been using ear drops and cleaning his ears with a syringe. He is unsure if he requires hearing aids.    Objective   Vital Signs:  /72 (BP Location: Left arm, Patient Position: Sitting, Cuff Size:  "Adult)   Pulse 54   Temp 98.4 °F (36.9 °C) (Temporal)   Ht 167.6 cm (65.98\")   Wt 68.6 kg (151 lb 3.2 oz)   SpO2 98%   BMI 24.42 kg/m²   Estimated body mass index is 24.42 kg/m² as calculated from the following:    Height as of this encounter: 167.6 cm (65.98\").    Weight as of this encounter: 68.6 kg (151 lb 3.2 oz).       BMI is within normal parameters. No other follow-up for BMI required.      Physical Exam  Vitals and nursing note reviewed.   Constitutional:       General: He is not in acute distress.     Appearance: Normal appearance.   HENT:      Head: Normocephalic and atraumatic.      Comments: Minimal cerumen bilaterally     Right Ear: Tympanic membrane, ear canal and external ear normal.      Left Ear: Tympanic membrane, ear canal and external ear normal.      Mouth/Throat:      Mouth: Oral lesions (0.8 cm kennedi ulceration inside lower lip and center.  Second, 0.3 cm kennedi ulceration inside right lower lip.) present.   Cardiovascular:      Rate and Rhythm: Normal rate and regular rhythm.      Heart sounds: Normal heart sounds. No murmur heard.     No friction rub. No gallop.   Pulmonary:      Effort: Pulmonary effort is normal.      Breath sounds: Normal breath sounds. No wheezing, rhonchi or rales.   Musculoskeletal:      Right lower leg: No edema.      Left lower leg: No edema.   Neurological:      General: No focal deficit present.      Mental Status: He is alert. Mental status is at baseline.   Psychiatric:         Mood and Affect: Mood normal.         Behavior: Behavior normal.        Physical Exam  Ears have more wax in one than the other, but both are open.    Result Review :          Results               Assessment and Plan     Diagnoses and all orders for this visit:    1. Fever blister (Primary)  -     valACYclovir (Valtrex) 1000 MG tablet; Take 2 tablets twice about 12 hours apart  Dispense: 4 tablet; Refill: 0      Assessment & Plan  1. Fever blister.  The fever blister could be related to " his current use of prednisone, which can lower the immune system. A prescription for valacyclovir was sent.  If symptoms persist, he should consult his dermatologist during his upcoming appointment in 4 days.    2. Stress management.  He was encouraged to seek home help and engage in social activities such as joining a [employer] group to improve his mental well-being.    3. Ear wax buildup.  He was advised to continue his current ear care regimen, including using drops and a syringe to clean his ears. Both ears are open, but the left ear has more wax buildup than the right.    Follow-up  A follow-up appointment is scheduled for 10/02/2024.         Follow Up     Return in about 7 weeks (around 10/2/2024) for As previously scheduled.  Labs and follow up.  Patient was given instructions and counseling regarding his condition or for health maintenance advice. Please see specific information pulled into the AVS if appropriate.     Patient or patient representative verbalized consent for the use of Ambient Listening during the visit with  Jama Powers MD for chart documentation. 8/15/2024  20:47 EDT

## 2024-10-01 ENCOUNTER — TELEPHONE (OUTPATIENT)
Dept: INTERNAL MEDICINE | Facility: CLINIC | Age: 79
End: 2024-10-01
Payer: MEDICARE

## 2024-10-08 ENCOUNTER — OFFICE VISIT (OUTPATIENT)
Age: 79
End: 2024-10-08
Payer: MEDICARE

## 2024-10-08 ENCOUNTER — PREP FOR SURGERY (OUTPATIENT)
Dept: SURGERY | Facility: SURGERY CENTER | Age: 79
End: 2024-10-08
Payer: MEDICARE

## 2024-10-08 VITALS
WEIGHT: 152 LBS | TEMPERATURE: 97.8 F | BODY MASS INDEX: 24.43 KG/M2 | DIASTOLIC BLOOD PRESSURE: 76 MMHG | OXYGEN SATURATION: 99 % | HEART RATE: 81 BPM | HEIGHT: 66 IN | SYSTOLIC BLOOD PRESSURE: 128 MMHG

## 2024-10-08 DIAGNOSIS — M46.1 SACROILIITIS: ICD-10-CM

## 2024-10-08 DIAGNOSIS — M53.3 CHRONIC LEFT SI JOINT PAIN: Primary | ICD-10-CM

## 2024-10-08 DIAGNOSIS — G89.29 CHRONIC LEFT SI JOINT PAIN: Primary | ICD-10-CM

## 2024-10-08 DIAGNOSIS — M48.062 SPINAL STENOSIS OF LUMBAR REGION WITH NEUROGENIC CLAUDICATION: ICD-10-CM

## 2024-10-08 DIAGNOSIS — M54.50 LUMBOSACRAL PAIN, CHRONIC: ICD-10-CM

## 2024-10-08 DIAGNOSIS — M51.360 DEGENERATION OF INTERVERTEBRAL DISC OF LUMBAR REGION WITH DISCOGENIC BACK PAIN: ICD-10-CM

## 2024-10-08 DIAGNOSIS — G89.29 LUMBOSACRAL PAIN, CHRONIC: ICD-10-CM

## 2024-10-08 PROCEDURE — 99214 OFFICE O/P EST MOD 30 MIN: CPT

## 2024-10-08 PROCEDURE — 1160F RVW MEDS BY RX/DR IN RCRD: CPT

## 2024-10-08 PROCEDURE — 1159F MED LIST DOCD IN RCRD: CPT

## 2024-10-08 PROCEDURE — 1125F AMNT PAIN NOTED PAIN PRSNT: CPT

## 2024-10-08 RX ORDER — MUPIROCIN 20 MG/G
1 OINTMENT TOPICAL 2 TIMES DAILY
COMMUNITY
Start: 2024-08-27

## 2024-10-08 NOTE — PROGRESS NOTES
"CHIEF COMPLAINT  Back pain    Subjective   Moises Miranda is a 79 y.o. male.   He presents to the office for evaluation of back pain. He was referred here by JF Wilson.     Patient reports that back pain has been an ongoing issue for many years. No injury noted. Patient as a history of RA. He has underwent numerous injections with [facility] that offered relief in the past but wanted to be seen here as it was closer to his home.    Today pain is 7/10VAS in severity. Pain is located his low back and radiates into left buttock. Denies radicular leg pain. Describes this pain as an intermittent \"dull ache\". Pain is worsened by walking long distances, prolonged standing, climbing stairs, and sometimes \"pain is just there\". Pain improves with rest/reposition and medications. He notes some improvement with PT in the past. He continues with HEP and lift light weights as tolerated.     Current medication regimen OTC Tylenol PRN, Methotrexate 2.5mg 10 tablets weekly (prescribed by JF Wilson) and Prednisone 5mg daily.     Not a candidate for NSAID's due to decreased renal function     History of CHF - patient has a defibrillator     Past pain medications: None    Past therapies:  Physical Therapy: Yes  Chiropractor: Yes  Massage Therapy: No  TENS: No  Neck or back surgery: No  Past pain management: Yes - [facility]    Back Pain  This is a chronic problem. The current episode started more than 1 year ago. The problem occurs intermittently. The problem has been comes and goes since onset. The pain is present in the lumbar spine. The quality of the pain is described as aching. The pain radiates to the left buttock. The pain is at a severity of 7/10. The pain is moderate. The symptoms are aggravated by position and standing (climbing stairs). Pertinent negatives include no abdominal pain, chest pain, dysuria, fever, headaches, numbness or weakness. He has tried chiropractic manipulation, bed rest and " home exercises (Tylenol, PT) for the symptoms.     PEG Assessment   What number best describes your pain on average in the past week?7  What number best describes how, during the past week, pain has interfered with your enjoyment of life?7  What number best describes how, during the past week, pain has interfered with your general activity?  7      Current Outpatient Medications:     alendronate (FOSAMAX) 70 MG tablet, Take 1 tablet by mouth Every 7 (Seven) Days., Disp: , Rfl:     aspirin 81 MG EC tablet, 1 tablet Daily., Disp: , Rfl:     atorvastatin (LIPITOR) 20 MG tablet, Take 1 tablet by mouth., Disp: , Rfl:     carvedilol (COREG) 12.5 MG tablet, TAKE 1/2 TABLET TWICE DAILY WITH MEALS, Disp: , Rfl:     fluticasone (FLONASE) 50 MCG/ACT nasal spray, 2 sprays into the nostril(s) as directed by provider Daily., Disp: 16 g, Rfl: 0    folic acid (FOLVITE) 1 MG tablet, Take 1 tablet by mouth 3 (Three) Times a Day., Disp: , Rfl:     furosemide (LASIX) 40 MG tablet, 1/2 tab PO q daily, Disp: , Rfl:     loratadine (CLARITIN) 10 MG tablet, Take 1 tablet by mouth Daily., Disp: 30 tablet, Rfl: 0    methotrexate 2.5 MG tablet, , Disp: , Rfl:     mupirocin (BACTROBAN) 2 % ointment, Apply 1 Application topically to the appropriate area as directed 2 (Two) Times a Day., Disp: , Rfl:     omeprazole (priLOSEC) 20 MG capsule, Take 1 capsule by mouth Daily., Disp: 90 capsule, Rfl: 3    Pediatric Multivitamins-Iron (multivitamin chewable) chewable tablet, 1 tablet Daily., Disp: , Rfl:     predniSONE (DELTASONE) 5 MG tablet, Take 1 tablet by mouth Daily., Disp: , Rfl:     sacubitril-valsartan (Entresto) 24-26 MG tablet, Take 1 tablet by mouth 2 (Two) Times a Day., Disp: 60 tablet, Rfl: 3    spironolactone (ALDACTONE) 25 MG tablet, , Disp: , Rfl:     tamsulosin (FLOMAX) 0.4 MG capsule 24 hr capsule, Take 1 capsule by mouth Daily., Disp: , Rfl:     valACYclovir (Valtrex) 1000 MG tablet, Take 2 tablets twice about 12 hours apart (Patient  not taking: Reported on 10/8/2024), Disp: 4 tablet, Rfl: 0    The following portions of the patient's history were reviewed and updated as appropriate: allergies, current medications, past family history, past medical history, past social history, past surgical history, and problem list.    REVIEW OF PERTINENT MEDICAL DATA  Reviewed office note from JF Wilson from 8/1/2024.  Patient presents with complaints of pain and stiffness due to history of rheumatoid arthritis that is managed with Methotrexate.  He is not a candidate for NSAIDs due to decreased renal function as well as cardiac history.  She notes that he sees a chiropractor for back pain/degenerative disc disease.  She also notes he is either going to pain management or has a history of seeing pain management with epidurals.    He has been seen by [facility] in the past and underwent numerous interventions with their clinic.  Last seen by them on 5/22/2024. Interventional procedures listed below:    MRI LUMBAR SPINE WITHOUT CONTRAST     HISTORY: Low back pain, melanoma, prostate cancer.     COMPARISON: No prior MRI examination of the lumbar spine is available  for comparison.     FINDINGS: There is levoscoliosis of the lumbar spine with apex at the  level of L4 with a compensatory dextroscoliosis at L1. There is  moderate-to-severe loss of disc height and disc desiccation from T12 to  L4. Loss of disc height is more prominent to the left at T12-L1 and  L1-L2 and to the right at L3-L4. The conus is at L1 and the caudal  aspect of the spinal cord appears unremarkable.     There is fusiform aneurysmal enlargement of the infrarenal abdominal  aorta which is only partially visualized. This measures approximately  4.7 cm in the maximum transverse dimension. The aorta measured  approximately 2.4 cm in maximum transverse dimension on the CT  examination of the abdomen and pelvis from 03/24/2004.     A fatty filum terminale is appreciated. This extends  from L3 to L5-S1.  There is prominent epidural fat consistent with epidural lipomatosis  from L2 to L5.     T12-L1: A central and left paramedian inferiorly directed disc  herniation is appreciated which extends to the junction of the superior  and middle thirds of the L1 vertebral body. Mild foraminal stenosis is  present bilaterally secondary to extension of a disc osteophyte complex  into the neural foramen.     L1-L2: Mild-to-moderate facet degenerative disease is present  bilaterally. A mild broad-based disc osteophyte complex is present which  results in mild canal stenosis. There is moderate-to-severe foraminal  stenosis on the left secondary to loss of disc height and extension of a  disc osteophyte complex into the neural foramen.     L2-L3: Severe central canal stenosis secondary to a mild broad-based  disc osteophyte complex and epidural lipomatosis, further accentuated by  mild-to-moderate and moderate-to-severe facet degenerative disease at  the left and right respectively. Mild foraminal stenosis is present on  the left. There is moderate-to-severe foraminal stenosis on the right  secondary to facet hypertrophy and to a lesser extent extension of disc  material into the neural foramen.     L3-L4: Moderate facet degenerative disease is present bilaterally. There  is moderate-to-severe central canal stenosis secondary to the epidural  lipomatosis and a mild broad-based disc osteophyte complex.  Moderate-to-severe foraminal stenosis is present on the right secondary  to loss of disc height and extension of a disc osteophyte complex into  the neural foramen.     L4-L5: There is moderate-to-severe if not severe central canal stenosis  secondary to a broad-based disc osteophyte complex which is more  prominent to the left, mild facet ligamentum flavum hypertrophy and  epidural lipomatosis. Mild foraminal stenosis is present bilaterally  secondary to extension of a disc osteophyte complex into the  neural  foramen.     L5-S1: A mild broad-based disc osteophyte complex is present.  Moderate-to-severe facet degenerative disease is present bilaterally.  Fluid is present within the facets bilaterally. A synovial cyst is  present on the left contributing to mild foraminal stenosis. It  approaches the posterior aspect of the left L5 nerve as the nerve exits  the neural foramen but does not definitively involve the nerve.     IMPRESSION:  1.  There is a fusiform infrarenal abdominal aortic aneurysm which  measures 4.7 cm in transverse dimension. It is only partially  visualized. The aorta measured 2.4 cm in the transverse dimension at the  same level on the CT examination of the abdomen and pelvis performed on  03/24/2004.  2.  There is an inferiorly directed disc herniation to the left of  midline at T12-L1 which extends inferiorly to the junction of the  superior and middle thirds of the L1 vertebral body.  3.  Multilevel degenerative disease involving the lumbar spine is noted  as described in detail above including severe canal stenosis at L2-L3,  L3-L4 and moderate-to-severe if not severe canal stenosis at L4-L5. This  is secondary to broad-based disc osteophyte complexes, posterior element  degenerative disease and epidural lipomatosis. A synovial cyst is  present on the left at L5-S1 approaching but does not definitively  involve the left L5 nerve as it exits the neural foramen.  4.  A fatty filum terminale is noted.     The above information was called to and discussed with Dr. Staples on  04/06/2023 at 1245 hours.     This report was finalized on 4/7/2023 5:09 PM by Dr. Richard Altamirano M.D.     Review of Systems   Constitutional:  Positive for activity change (decreased). Negative for chills, fatigue and fever.   HENT:  Negative for congestion.    Eyes:  Negative for visual disturbance.   Respiratory:  Negative for chest tightness and shortness of breath.    Cardiovascular:  Negative for chest pain.  "  Gastrointestinal:  Negative for abdominal pain, constipation and diarrhea.   Genitourinary:  Negative for difficulty urinating and dysuria.   Musculoskeletal:  Positive for back pain.   Neurological:  Negative for dizziness, weakness, light-headedness, numbness and headaches.   Psychiatric/Behavioral:  Negative for agitation, self-injury, sleep disturbance and suicidal ideas. The patient is not nervous/anxious.      I have reviewed and confirmed the accuracy of the ROS as documented by the MA/LPN/RN JF Anderson    Vitals:    10/08/24 1358   BP: 128/76   Pulse: 81   Temp: 97.8 °F (36.6 °C)   SpO2: 99%   Weight: 68.9 kg (152 lb)   Height: 167.6 cm (66\")   PainSc:   7   PainLoc: Buttocks     Objective     Physical Exam  Constitutional:       Appearance: Normal appearance.   HENT:      Head: Normocephalic.   Cardiovascular:      Rate and Rhythm: Normal rate and regular rhythm.   Pulmonary:      Effort: Pulmonary effort is normal.      Breath sounds: Normal breath sounds.   Musculoskeletal:         General: Normal range of motion.      Cervical back: Normal range of motion.      Lumbar back: Tenderness and bony tenderness present. Decreased range of motion.      Comments: + lumbar facet  loading/tenderness  + Left SI joint tenderness  + Left RONN's test  + Left Gaenslen's test    Skin:     General: Skin is warm and dry.      Capillary Refill: Capillary refill takes less than 2 seconds.   Neurological:      General: No focal deficit present.      Mental Status: He is alert and oriented to person, place, and time.   Psychiatric:         Mood and Affect: Mood normal.         Behavior: Behavior normal.         Thought Content: Thought content normal.         Cognition and Memory: Cognition normal.       Assessment & Plan   Diagnoses and all orders for this visit:    1. Chronic left SI joint pain (Primary)    2. Sacroiliitis    3. Degeneration of intervertebral disc of lumbar region with discogenic back " pain    4. Spinal stenosis of lumbar region with neurogenic claudication      --- Moises Miranda reports a pain score of 7.  Given his pain assessment as noted, treatment options were discussed and the following options were decided upon as a follow-up plan to address the patient's pain: continuation of current treatment plan for pain and steroid injections.    --- Left SI Joint Injection  ----------  Education about Sacroiliac joint injections:  This Sacroiliac joint injection (blockade) we have suggested is intended for diagnostic purposes, with the intent of offering the patient Radiofrequency thermal rhizotomy (RF) of the sensory branches to the joint if the block is diagnostically effective.  The diagnostic blockade is necessary to determine the likelihood that RF therapy could be efficacious in providing long term relief to the patient.    In this procedure, the sacroiliac joint is aligned with imaging, and under image guidance a needle is placed with the needle tip into the joint.  The needle position is confirmed to be appropriately in the joint before injection of medication into the joint.  When xray fluoroscopy is used, contrast dye is used to confirm a proper arthrogram (i.e., outline of the joint).  When ultrasound is used, IV fluid (normal saline) is injected to see the flow of the fluid into the joint.  Once confirmed, then the medication can be injected into the joint.  Oftentimes this medication is a combination of local anesthetics (for diagnostic purposes) and also a steroid (to decrease irritation & inflammation in the joint, also known as sacroilitis).      Medically, a successful RF procedure should provide a patient with 50% pain relief or more for at least 6 months.  Clinical experience suggests that successful patients receive relief more in the range of 12 months on average.  We also discussed that many patients receive therapeutic success from the intraarticular joint injection, and may not  require RF ablation.  If a patient receives more than 8 weeks of relief from joint injection, then occasional repeat joint blocks for therapeutic purposes is a very reasonable alternative therapy.  This course of therapy is consistent with our LCDs according to our CMS  in the area, and therefore other insurance providers should follow accordingly.  We will monitor our patients to screen for these therapeutic responders and will offer RF therapy only when necessary.      We discussed that joint injections & also RF procedures are not without risks.  Best practices regarding anticoagulant use & neuraxial procedures will be respected.  Oftentimes a patient on an anticoagulant can be offered a joint injection safely, but again this is not risk-free, and such patients give consent with regards to this increased bleeding risk, which could cause problems including but not limited to worsening of pain, nerve damage, or muscle damage.  Patients that are ill or otherwise may be at risk for sepsis will not have their spines accessed by neuraxial injections of any type.  This patient will not be offered these therapies if there is an increased risk.   We discussed that there is a risk of postprocedural pain and also a risk of worsening of clinical picture with these procedures as with any neuraxial procedure.    ----------   --- Follow-up for procedure      Pain / Disability Scale    The scale used for measurement of pain and/or disability for this patient was the [place] back pain disability scale.  The score was 21 on 10/08/2024    VIET REPORT  As the clinician, I personally reviewed the VIET from 10/8/24 while the patient was in the office today.    Dictated utilizing Dragon dictation.

## 2024-10-10 ENCOUNTER — TELEPHONE (OUTPATIENT)
Dept: PAIN MEDICINE | Facility: HOSPITAL | Age: 79
End: 2024-10-10

## 2024-10-11 NOTE — DISCHARGE INSTRUCTIONS
[facility] - Post-procedure Instructions              --  While there are no absolute restrictions, it is recommended that you do not perform strenuous activity today. In the morning, you may resume your level of activity as before your block.    --  If you have a band-aid at your injection site, please remove it later today. Observe the area for any redness, swelling, pus-like drainage, or a temperature over 101°. If any of these symptoms occur, please call your doctor at [phone]. If after office hours, leave a message and the on-call provider will return your call.    --  Ice may be applied to your injection site. It is recommended you avoid direct heat (heating pad; hot tub) for 1-2 days.    --  Call [facility] at [phone] if you experience persistent headache, persistent bleeding from the injection site, or severe pain not relieved by heat or oral medication.    --  Do not make important decisions today.    --  Due to the effects of the block and/or the Sedation, DO NOT drive or operate hazardous machinery for 12 hours.  Local anesthetics may cause numbness after procedure and precautions must be taken with regards to operating equipment as well as with walking, even if ambulating with assistance of another person or with an assistive device.    --  Do not drink alcohol for 12 hours.    -- You may return to work tomorrow, or as directed by your referring doctor.    --  Occasionally you may notice a slight increase in your pain after the procedure. This should start to improve within the next 24-48 hours. Radiofrequency ablation procedure pain may last 3-4 weeks.    --  It may take as long as 3-4 days before you notice a gradual improvement in your pain and/or other symptoms.    -- You may continue to take your prescribed pain medication as needed.    --  Some normal possible side effects of steroid use could include fluid retention, increased blood sugar, dull headache, increased  sweating, increased appetite, mood swings and flushing.    --  Diabetics are recommended to watch their blood glucose level closely for 24-48 hours after the injection.    --  Must stay in PACU for 20 min upon arrival and prove no leg weakness before being discharged.    --  IN THE EVENT OF A LIFE THREATENING EMERGENCY, (CHEST PAIN, BREATHING DIFFICULTIES, PARALYSIS…) YOU SHOULD GO TO YOUR NEAREST EMERGENCY ROOM.    --  You should be contacted by our office within 2-3 days to schedule follow up or next appointment date (if not already scheduled).  If not contacted within 7 days, please call the office at [phone].

## 2024-10-15 ENCOUNTER — HOSPITAL ENCOUNTER (OUTPATIENT)
Dept: PAIN MEDICINE | Facility: HOSPITAL | Age: 79
Discharge: HOME | End: 2024-10-15
Payer: MEDICARE

## 2024-10-15 ENCOUNTER — HOSPITAL ENCOUNTER (OUTPATIENT)
Dept: GENERAL RADIOLOGY | Facility: HOSPITAL | Age: 79
Discharge: HOME | End: 2024-10-15
Payer: MEDICARE

## 2024-10-15 VITALS
BODY MASS INDEX: 24.11 KG/M2 | DIASTOLIC BLOOD PRESSURE: 70 MMHG | SYSTOLIC BLOOD PRESSURE: 110 MMHG | OXYGEN SATURATION: 99 % | WEIGHT: 150 LBS | HEIGHT: 66 IN | RESPIRATION RATE: 16 BRPM | TEMPERATURE: 97.9 F | HEART RATE: 69 BPM

## 2024-10-15 DIAGNOSIS — G89.29 CHRONIC LEFT SI JOINT PAIN: ICD-10-CM

## 2024-10-15 DIAGNOSIS — G89.29 LUMBOSACRAL PAIN, CHRONIC: ICD-10-CM

## 2024-10-15 DIAGNOSIS — M54.50 LUMBOSACRAL PAIN, CHRONIC: ICD-10-CM

## 2024-10-15 DIAGNOSIS — M46.1 SACROILIITIS: ICD-10-CM

## 2024-10-15 DIAGNOSIS — R52 PAIN: ICD-10-CM

## 2024-10-15 DIAGNOSIS — M53.3 CHRONIC LEFT SI JOINT PAIN: ICD-10-CM

## 2024-10-15 PROCEDURE — 25010000002 LIDOCAINE PF 1% 1 % SOLUTION: Performed by: ANESTHESIOLOGY

## 2024-10-15 PROCEDURE — 77002 NEEDLE LOCALIZATION BY XRAY: CPT

## 2024-10-15 PROCEDURE — 25010000002 DEXAMETHASONE SODIUM PHOSPHATE 10 MG/ML SOLUTION: Performed by: ANESTHESIOLOGY

## 2024-10-15 PROCEDURE — C1755 CATHETER, INTRASPINAL: HCPCS

## 2024-10-15 PROCEDURE — 25510000001 IOPAMIDOL 61 % SOLUTION: Performed by: ANESTHESIOLOGY

## 2024-10-15 PROCEDURE — 27096 INJECT SACROILIAC JOINT: CPT | Performed by: ANESTHESIOLOGY

## 2024-10-15 RX ORDER — DEXAMETHASONE SODIUM PHOSPHATE 10 MG/ML
10 INJECTION, SOLUTION INTRAMUSCULAR; INTRAVENOUS ONCE
Status: COMPLETED | OUTPATIENT
Start: 2024-10-15 | End: 2024-10-15

## 2024-10-15 RX ORDER — IOPAMIDOL 612 MG/ML
3 INJECTION, SOLUTION INTRAVASCULAR
Status: COMPLETED | OUTPATIENT
Start: 2024-10-15 | End: 2024-10-15

## 2024-10-15 RX ORDER — LIDOCAINE HYDROCHLORIDE 10 MG/ML
5 INJECTION, SOLUTION EPIDURAL; INFILTRATION; INTRACAUDAL; PERINEURAL ONCE
Status: COMPLETED | OUTPATIENT
Start: 2024-10-15 | End: 2024-10-15

## 2024-10-15 RX ADMIN — IOPAMIDOL 3 ML: 612 INJECTION, SOLUTION INTRAVENOUS at 08:08

## 2024-10-15 RX ADMIN — LIDOCAINE HYDROCHLORIDE 5 ML: 10 INJECTION, SOLUTION EPIDURAL; INFILTRATION; INTRACAUDAL; PERINEURAL at 08:08

## 2024-10-15 RX ADMIN — DEXAMETHASONE SODIUM PHOSPHATE 10 MG: 10 INJECTION INTRAMUSCULAR; INTRAVENOUS at 08:08

## 2024-10-15 NOTE — H&P
Moises Miranda is a 79 y.o.  male who returns today for a scheduled procedure.  The previous clinic note has been reviewed.  There has been no change in the location or quality of the patient's pain.  The pain is currently rated as 3/10 in severity.  The patient is alert at the time of exam and is emotionally appropriate without significant debilities that would disqualify from the procedure.     Planned Procedure: Left sacroiliac joint injection    Vitals:    10/15/24 0704   BP: 107/63   Pulse: 71   Resp: 16   Temp: 97.9 °F (36.6 °C)   SpO2: 97%       The risks and benefits of the procedure have been discussed with the patient who has elected to proceed.  There are no contraindications to the procedure at this time.  Appropriate consent forms have been signed.  All questions regarding the planned procedure have been addressed.  Please see the separate documentation for details of the interventional procedure.

## 2024-10-15 NOTE — PROCEDURES
Procedures    Left Sacroiliac Joint Injection  [facility]      PREOPERATIVE DIAGNOSIS:   Sacroiliac joint dysfunction    POSTOPERATIVE DIAGNOSIS:  Same    PROCEDURE:  Sacroiliac Joint Injection, with fluoroscopic guidance CPT 60755 -LT    PRE-PROCEDURE DISCUSSION WITH PATIENT:    Risks and complications were discussed with the patient prior to starting the procedure and informed consent was obtained.  We discussed various topics including but not limited to bleeding, infection, injury, postprocedural site soreness, painful flareup, worsening of clinical picture, paralysis, coma, and death.     SURGEON:  Alaina Roy MD    REASON FOR PROCEDURE:    Patient has pain consistent with SI pathology on history and physical exam.    SEDATION:  No sedation was used for this procedure  ANESTHETIC AGENT:  1% Lidocaine  STEROID AGENT:  10mg Dexamethasone    DESCRIPTON OF PROCEDURE:  After obtaining informed consent, IV access was not obtained in the preoperative area.  The patient was transported to the operative suite and placed in the prone position. Heart rate, blood pressure, and pulse oximeter were monitored. The lumbosacral area was prepped with Chloraprep and draped in a sterile fashion. Under fluoroscopic guidance the inferior most portion of the left SI joint was identified. The overlying skin and subcutaneous tissue was anesthetized with 1% lidocaine. A 22-gauge spinal needle was then advanced under fluoroscopic guidance in a coaxial view into the joint space.  After negative aspiration, 1 mL of Isovue 61% was injected, and after seeing appropriate spread into the joint a volume of 3ml of the above medication was injected.    Needle was removed intact.      Vital signs remained stable.  The onset of analgesia was noted.    Pre-procedure pain score: 3/10 at rest, 7/10 with activity  Post-procedure pain score: 0/10      ESTIMATED BLOOD LOSS:  minimal  SPECIMENS:  None  COMPLICATIONS:  No complications were  noted. and There was no indication of vascular uptake on live injection of contrast dye.    TOLERANCE & DISCHARGE CONDITION:    The patient tolerated the procedure well.  The patient was transported to the recovery area without difficulties.  The patient was discharged to home under the care of family in stable and satisfactory condition.    PLAN OF CARE:  The patient was given our standard instruction sheet and will resume all medications as per the medication reconciliation sheet.  The patient will Return to clinic 4-6 wks.  The patient is instructed to keep an account of pain relief after the procedure.

## 2024-10-17 ENCOUNTER — TELEPHONE (OUTPATIENT)
Dept: PAIN MEDICINE | Facility: HOSPITAL | Age: 79
End: 2024-10-17
Payer: MEDICARE

## 2024-10-17 NOTE — TELEPHONE ENCOUNTER
Post pin injection follow up: Spoke to patient's wife who stated that patient is not having any pain 100% relief

## 2024-10-21 DIAGNOSIS — K21.9 GASTROESOPHAGEAL REFLUX DISEASE WITHOUT ESOPHAGITIS: ICD-10-CM

## 2024-10-21 NOTE — TELEPHONE ENCOUNTER
Caller: RubenEnriqueta    Relationship: Emergency Contact    Best call back number: [phone]     Requested Prescriptions:   Requested Prescriptions     Pending Prescriptions Disp Refills    omeprazole (priLOSEC) 20 MG capsule 90 capsule 3     Sig: Take 1 capsule by mouth Daily.        Pharmacy where request should be sent: [facility] - [facility] [address] - [phone]  - [phone] FX     Last office visit with prescribing clinician: 8/15/2024   Last telemedicine visit with prescribing clinician: Visit date not found   Next office visit with prescribing clinician: Visit date not found     Additional details provided by patient: PATIENT IS OUT OF THE MEDICATION.     Does the patient have less than a 3 day supply:  [x] Yes  [] No    Would you like a call back once the refill request has been completed: [] Yes [x] No    If the office needs to give you a call back, can they leave a voicemail: [x] Yes [] No    Bernadine Reynolds Rep   10/21/24 15:44 EDT

## 2024-10-22 NOTE — TELEPHONE ENCOUNTER
Please schedule this patient an appt per Dr. Gmoes.  He actually needs to establish care with one of our providers, because he was a   Dr. Powers patient.    Thank you

## 2024-10-24 NOTE — TELEPHONE ENCOUNTER
Okay for hub to read*  I called and spoke with Enriqueta regarding her appointment with Dr. Powers. She mentioned Moises also had an appointment with Dr. Powers. I offered to reschedule both of them with Dr. Sylvester, Enriqueta declined at this time. Saying she would call to schedule another time. Appts canceled. No further action taken.

## 2024-10-24 NOTE — TELEPHONE ENCOUNTER
Okay for hub to read*  I attempted to call Enriqueta back. The line was unavailable. Per encounter on 10/01/2024, when I had a previously spoken with Enriqueta I had advised that they must schedule with a new primary care to continue receiving medications. And I offered to schedule with Pj Sylvester. Enriqueta had declined at the time saying she would call back to schedule. Appts canceled. No further action taken.

## 2024-10-31 ENCOUNTER — OFFICE VISIT (OUTPATIENT)
Dept: INTERNAL MEDICINE | Facility: CLINIC | Age: 79
End: 2024-10-31
Payer: MEDICARE

## 2024-10-31 VITALS
SYSTOLIC BLOOD PRESSURE: 120 MMHG | BODY MASS INDEX: 24.33 KG/M2 | WEIGHT: 151.4 LBS | HEIGHT: 66 IN | HEART RATE: 92 BPM | DIASTOLIC BLOOD PRESSURE: 80 MMHG | OXYGEN SATURATION: 97 %

## 2024-10-31 DIAGNOSIS — L72.0 CYST OF SKIN AND SUBCUTANEOUS TISSUE: ICD-10-CM

## 2024-10-31 DIAGNOSIS — L08.9 LOCAL SKIN INFECTION: Primary | ICD-10-CM

## 2024-10-31 PROCEDURE — 1160F RVW MEDS BY RX/DR IN RCRD: CPT

## 2024-10-31 PROCEDURE — 1125F AMNT PAIN NOTED PAIN PRSNT: CPT

## 2024-10-31 PROCEDURE — 1159F MED LIST DOCD IN RCRD: CPT

## 2024-10-31 PROCEDURE — 99213 OFFICE O/P EST LOW 20 MIN: CPT

## 2024-10-31 RX ORDER — DOXYCYCLINE 100 MG/1
100 CAPSULE ORAL 2 TIMES DAILY
Qty: 20 CAPSULE | Refills: 0 | Status: SHIPPED | OUTPATIENT
Start: 2024-10-31 | End: 2024-11-10

## 2024-10-31 NOTE — PROGRESS NOTES
"Chief Complaint  Follow-up (Drainage on left leg its been there for 2 months)    Subjective        Moises Miranda presents to [facility]  History of Present Illness    Moises presents today with drainage from a skin laceration on left lower leg  He had a punch biopsy done at his dermatology office (Dr. Tinoco) for a lesion/katherine on left lower leg about 3-4 months ago per patient  His ortho provider, JF Veras, is also following him for cystic lesions and drained a lesion on his left knee several years ago  The know on his leg improved and resolved but there remains a small pinpoint hole in which clear and sometimes yellow fluid constantly drains out  The drainage has gotten worse and increased; no pain or tenderness  Covers area with a bandage sometimes but tries to leave open to drain    He also states his ortho provider stated he needs a knee replacement and he wants a second opinion  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    Weight as of this encounter: 68.7 kg (151 lb 6.4 oz).    BMI is within normal parameters. No other follow-up for BMI required.      Physical Exam  Constitutional:       Appearance: Normal appearance. He is not ill-appearing.   Musculoskeletal:        Legs:       Comments: Pinpoint circular opening in the skin with mild surrounding erythema and moderate amount of clear drainage, slight warmth to touch; not tender to touch, no edema   Skin:     General: Skin is warm.   Neurological:      Mental " Status: He is alert.      Motor: No weakness.      Gait: Gait normal.   Psychiatric:         Mood and Affect: Mood normal.         Behavior: Behavior normal.         Thought Content: Thought content normal.         Judgment: Judgment normal.        Result Review :  The following data was reviewed by: JF Romo on 10/31/2024:    Data reviewed : reviewed ortho notes, previous encounters           Assessment and Plan   Diagnoses and all orders for this visit:    1. Local skin infection (Primary)  -     doxycycline (VIBRAMYCIN) 100 MG capsule; Take 1 capsule by mouth 2 (Two) Times a Day for 10 days.  Dispense: 20 capsule; Refill: 0    2. Cyst of skin and subcutaneous tissue      It appears there might be a secondary skin infection post punch biopsy and cystic lesion in left lower leg. Not definitive however will cover with antibiotics. Reviewed ortho notes. Dermatology notes not available for review. Patient to return to ortho for further follow up as it seems JF Veras was managing patients condition in collaboration with his dermatologist, Dr. Tinoco. Patient has an appointment on 11/12. He is to call their office and I will also try to reach out to JF Veras about possibly moving up his appointment. In regards to a knee replacement, further discussion with JF Veras would be beneficial. He is also welcome to discuss further with Dr. Segura when he establishes care or seek a second opinion if he wishes.  (Dr. Segura consulted and assisted in exam room. Agrees with plan.)         Follow Up   Return if symptoms worsen or fail to improve.  Patient was given instructions and counseling regarding his condition or for health maintenance advice. Please see specific information pulled into the AVS if appropriate.

## 2024-11-07 ENCOUNTER — LAB (OUTPATIENT)
Dept: LAB | Facility: HOSPITAL | Age: 79
End: 2024-11-07
Payer: MEDICARE

## 2024-11-07 ENCOUNTER — OFFICE VISIT (OUTPATIENT)
Dept: ORTHOPEDIC SURGERY | Facility: CLINIC | Age: 79
End: 2024-11-07
Payer: MEDICARE

## 2024-11-07 VITALS — WEIGHT: 151 LBS | HEIGHT: 66 IN | BODY MASS INDEX: 24.27 KG/M2

## 2024-11-07 DIAGNOSIS — M25.462 EFFUSION OF LEFT KNEE: ICD-10-CM

## 2024-11-07 DIAGNOSIS — S81.832A: ICD-10-CM

## 2024-11-07 DIAGNOSIS — S81.832A: Primary | ICD-10-CM

## 2024-11-07 DIAGNOSIS — M17.12 PRIMARY OSTEOARTHRITIS OF LEFT KNEE: ICD-10-CM

## 2024-11-07 LAB
BASOPHILS # BLD AUTO: 0.06 10*3/MM3 (ref 0–0.2)
BASOPHILS NFR BLD AUTO: 0.6 % (ref 0–1.5)
CRP SERPL-MCNC: <0.3 MG/DL (ref 0–0.5)
CRYSTALS FLD MICRO: NORMAL
DEPRECATED RDW RBC AUTO: 53.2 FL (ref 37–54)
EOSINOPHIL # BLD AUTO: 0.08 10*3/MM3 (ref 0–0.4)
EOSINOPHIL NFR BLD AUTO: 0.9 % (ref 0.3–6.2)
ERYTHROCYTE [DISTWIDTH] IN BLOOD BY AUTOMATED COUNT: 13.4 % (ref 12.3–15.4)
ERYTHROCYTE [SEDIMENTATION RATE] IN BLOOD: 3 MM/HR (ref 0–20)
HCT VFR BLD AUTO: 43.9 % (ref 37.5–51)
HGB BLD-MCNC: 14.5 G/DL (ref 13–17.7)
IMM GRANULOCYTES # BLD AUTO: 0.1 10*3/MM3 (ref 0–0.05)
IMM GRANULOCYTES NFR BLD AUTO: 1.1 % (ref 0–0.5)
LYMPHOCYTES # BLD AUTO: 1.82 10*3/MM3 (ref 0.7–3.1)
LYMPHOCYTES NFR BLD AUTO: 19.5 % (ref 19.6–45.3)
MACROCYTES BLD QL SMEAR: NORMAL
MCH RBC QN AUTO: 36 PG (ref 26.6–33)
MCHC RBC AUTO-ENTMCNC: 33 G/DL (ref 31.5–35.7)
MCV RBC AUTO: 108.9 FL (ref 79–97)
MONOCYTES # BLD AUTO: 0.62 10*3/MM3 (ref 0.1–0.9)
MONOCYTES NFR BLD AUTO: 6.6 % (ref 5–12)
NEUTROPHILS NFR BLD AUTO: 6.67 10*3/MM3 (ref 1.7–7)
NEUTROPHILS NFR BLD AUTO: 71.3 % (ref 42.7–76)
PLATELET # BLD AUTO: 210 10*3/MM3 (ref 140–450)
PMV BLD AUTO: 11.4 FL (ref 6–12)
RBC # BLD AUTO: 4.03 10*6/MM3 (ref 4.14–5.8)
SMALL PLATELETS BLD QL SMEAR: ADEQUATE
WBC MORPH BLD: NORMAL
WBC NRBC COR # BLD AUTO: 9.35 10*3/MM3 (ref 3.4–10.8)

## 2024-11-07 PROCEDURE — 86140 C-REACTIVE PROTEIN: CPT

## 2024-11-07 PROCEDURE — 87075 CULTR BACTERIA EXCEPT BLOOD: CPT

## 2024-11-07 PROCEDURE — 85025 COMPLETE CBC W/AUTO DIFF WBC: CPT

## 2024-11-07 PROCEDURE — 85652 RBC SED RATE AUTOMATED: CPT

## 2024-11-07 PROCEDURE — 36415 COLL VENOUS BLD VENIPUNCTURE: CPT

## 2024-11-07 PROCEDURE — 87070 CULTURE OTHR SPECIMN AEROBIC: CPT

## 2024-11-07 PROCEDURE — 89060 EXAM SYNOVIAL FLUID CRYSTALS: CPT

## 2024-11-07 PROCEDURE — 85007 BL SMEAR W/DIFF WBC COUNT: CPT

## 2024-11-07 NOTE — PROGRESS NOTES
Subjective:     Patient ID: Moises Miranda is a 79 y.o. male.    Chief Complaint:  Follow-up DJD left knee  Puncture wound left lower extremity with drainage  History of Present Illness  History of Present Illness  The patient is here today for a follow-up visit concerning his left knee.    He has previously received steroid injections in this knee, which have significantly alleviated his symptoms. He has been previously treated for degenerative joint disease (DJD) of the knee, and a prior aspiration was performed. Advanced imaging of the knee, unrelated to the dermatology procedure, was also conducted. Currently, he is experiencing increased pain and drainage leakage that continues through the punch biopsy. He reports no pain in his knee at present.    Recently, he has been treated by his primary care physician for worsening fluid leakage from his left lower extremity. He was prescribed oral antibiotics and was recently started on doxycycline. He reports no redness at this time and has been keeping the dressing covered. He reports no lab work from the wound and no other concerns at present.    He has also been seen by a dermatologist and underwent a punch biopsy. Although he was referred back to dermatology due to an issue along the proximal to mid fibula, he reports that no further treatment was recommended at that time. The fluid leakage from his lower extremity has not been treated by us as it was related to his dermatology visit.      Social History     Occupational History    Not on file   Tobacco Use    Smoking status: Former     Current packs/day: 0.00     Average packs/day: 1 pack/day for 15.0 years (15.0 ttl pk-yrs)     Types: Cigarettes     Quit date: 10/19/1990     Years since quittin.0    Smokeless tobacco: Never    Tobacco comments:     quit    Vaping Use    Vaping status: Never Used   Substance and Sexual Activity    Alcohol use: Not Currently     Alcohol/week: 3.0 standard drinks of alcohol      Types: 3 Cans of beer per week     Comment: few beers a week    Drug use: No    Sexual activity: Not Currently     Partners: Female      Past Medical History:   Diagnosis Date    Allergic     Anxiety     Benign prostatic hyperplasia     Bulbous urethral 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    Cancer     melanoma    Cataract     CHF (congestive heart failure)     Cholelithiasis     Colon polyp     Depression     Diverticulosis     Erectile dysfunction     GERD (gastroesophageal reflux disease)     HL (hearing loss)     Hyperlipidemia     Hypertension     Kidney stone     Low back pain     Rheumatoid arthritis     Visual impairment      Past Surgical History:   Procedure Laterality Date    CARDIAC DEFIBRILLATOR PLACEMENT      COLONOSCOPY  04 25 5 polops    COLONOSCOPY W/ POLYPECTOMY N/A 05/10/2024    Procedure: COLONOSCOPY WITH POLYPECTOMY;  Surgeon: Richard Dillon MD;  Location: [facility];  Service: Gastroenterology;  Laterality: N/A;  diverticulosis; sigmoid polyp x2 (cold snare); ascending polyp x1 (forceps); transverse polyp x1 (cold snare)    CYSTOSCOPY URETHROTOMY VISUAL INTERNAL N/A 09/28/2022    Procedure: CYSTOSCOPY WITH INTERNAL VISUAL URETHROTOMY;  Surgeon: Jose E Miranda MD;  Location: [facility];  Service: Urology;  Laterality: N/A;    EYE SURGERY      PROSTATE SURGERY      SACROILIAC JOINT INJECTION Left 10/15/2024    SKIN CANCER EXCISION  03/15/2013       Family History   Problem Relation Age of Onset    Alcohol abuse Maternal Grandmother     Cancer Brother     Hearing loss Brother     Hypertension Neg Hx                Objective:  Physical Exam  General: No acute distress.  Eyes: conjunctiva clear; pupils equally round and reactive  ENT: external ears and nose atraumatic; oropharynx clear  CV: no peripheral edema  Resp: normal respiratory effort  Skin: no rashes or wounds; normal turgor  Psych: mood and affect appropriate; recent and remote memory intact    Vitals:    11/07/24 1104   Weight:  "68.5 kg (151 lb)   Height: 167.6 cm (66\")         11/07/24  1104   Weight: 68.5 kg (151 lb)     Body mass index is 24.37 kg/m².      Ortho Exam     Physical Exam  Left lower extremity examined  Knee range of motion -3 degrees to 100 degrees  Stable to varus and valgus stress testing at 3 degrees and 30 degrees  1+ anterior Lachman exam  Positive crepitus arc of motion  Positive active patellar compression test  Puncture wound to the lower mid area of the fibula draining serous color fluid, minimal erythema surrounding the puncture wound, positive palpable tenderness surrounding the puncture wound    Assessment:        1. Puncture wound of left lower leg with complication, initial encounter    2. Primary osteoarthritis of left knee    3. Effusion of left knee         Assessment & Plan  1. Left knee follow-up.  He does not suspect a septic knee. The plan includes proceeding with lab work, including aspiration with culture, sensitivity, and crystal examination of the knee. Continue with doxycycline.     2. Wound management.  There is worsening fluid leaking from the left lower extremity. A culture of the wound at the left lower extremity will be performed. Referral to wound management is planned, but will wait for cultures and sensitivity to ensure septic knee is not of concern.       Orders:  Orders Placed This Encounter   Procedures    Anaerobic Culture - Aspirate, Knee, Left    Wound Culture - Aspirate, Knee, Left    Wound Culture - Drainage, Leg, Left    Anaerobic Culture - Drainage, Leg, Left    Body Fluid Cell Count With Differential - Synovial Fluid, Knee, Left    C-reactive Protein    Crystal Exam, Fluid - Synovial Fluid,    Sedimentation Rate    CBC & Differential     No orders of the defined types were placed in this encounter.          Dragon dictation utilized  BMI is within normal parameters. No other follow-up for BMI required.       Patient or patient representative verbalized consent for the use of " Ambient Listening during the visit with  JF Pinto for chart documentation. 11/7/2024  14:26 EST

## 2024-11-08 DIAGNOSIS — S81.832A: Primary | ICD-10-CM

## 2024-11-08 DIAGNOSIS — L08.9 LOCAL SKIN INFECTION: ICD-10-CM

## 2024-11-08 RX ORDER — DOXYCYCLINE 100 MG/1
100 CAPSULE ORAL 2 TIMES DAILY
Qty: 20 CAPSULE | Refills: 0 | Status: SHIPPED | OUTPATIENT
Start: 2024-11-08

## 2024-11-10 LAB
BACTERIA SPEC AEROBE CULT: NORMAL
BACTERIA SPEC AEROBE CULT: NORMAL
BACTERIA SPEC ANAEROBE CULT: NORMAL
BACTERIA SPEC ANAEROBE CULT: NORMAL

## 2024-11-12 ENCOUNTER — APPOINTMENT (OUTPATIENT)
Dept: WOUND CARE | Facility: HOSPITAL | Age: 79
End: 2024-11-12
Payer: MEDICARE

## 2024-11-12 ENCOUNTER — OFFICE VISIT (OUTPATIENT)
Age: 79
End: 2024-11-12
Payer: MEDICARE

## 2024-11-12 VITALS
SYSTOLIC BLOOD PRESSURE: 104 MMHG | TEMPERATURE: 98 F | BODY MASS INDEX: 24.78 KG/M2 | DIASTOLIC BLOOD PRESSURE: 66 MMHG | HEIGHT: 66 IN | WEIGHT: 154.2 LBS

## 2024-11-12 DIAGNOSIS — G89.29 CHRONIC LEFT SI JOINT PAIN: Primary | ICD-10-CM

## 2024-11-12 DIAGNOSIS — G89.29 LUMBOSACRAL PAIN, CHRONIC: ICD-10-CM

## 2024-11-12 DIAGNOSIS — M48.062 SPINAL STENOSIS OF LUMBAR REGION WITH NEUROGENIC CLAUDICATION: ICD-10-CM

## 2024-11-12 DIAGNOSIS — M51.360 DEGENERATION OF INTERVERTEBRAL DISC OF LUMBAR REGION WITH DISCOGENIC BACK PAIN: ICD-10-CM

## 2024-11-12 DIAGNOSIS — M46.1 SACROILIITIS: ICD-10-CM

## 2024-11-12 DIAGNOSIS — M53.3 CHRONIC LEFT SI JOINT PAIN: Primary | ICD-10-CM

## 2024-11-12 DIAGNOSIS — M54.50 LUMBOSACRAL PAIN, CHRONIC: ICD-10-CM

## 2024-11-12 LAB
BACTERIA SPEC ANAEROBE CULT: NORMAL
BACTERIA SPEC ANAEROBE CULT: NORMAL

## 2024-11-12 PROCEDURE — G0463 HOSPITAL OUTPT CLINIC VISIT: HCPCS

## 2024-11-12 NOTE — PROGRESS NOTES
"CHIEF COMPLAINT  Back pain    Subjective   Moises Miranda is a 79 y.o. male  who presents to the office for follow-up of procedure.  He completed a Left SI Joint Injection   on 10/15/24 performed by Dr. Roy for management of lumbosacral pain. Patient reports 85% relief from the procedure x 5 days.  He states that he was doing well following this procedure until his rheumatologist stopped his prednisone.  Pain has since returned.    Today pain is 2/10VAS in severity. Pain is located his low back and radiates into left buttock. Denies radicular leg pain. Describes this pain as an intermittent \"dull ache\". Pain is worsened by walking long distances, prolonged standing, climbing stairs, and sometimes \"pain is just there\". Pain improves with rest/reposition and medications. He notes some improvement with PT in the past. He continues with HEP and lift light weights as tolerated.      Current medication regimen OTC Tylenol PRN, Methotrexate 2.5mg 10 tablets weekly (prescribed by JF Wilson). Daily Prednisone 5mg was discontinued by his Rheumatologist.     Not a candidate for NSAID's due to decreased renal function      History of CHF - patient has a defibrillator     He is currently being treat by wound care and on an antibiotic for wound on left calf. He is scheduled to see them again on 11/15/24 and 11/19/24.    Procedure:  10/15/24 - Left SI Joint Injection - 85% relief x 5 days     Past pain medications: None     Past therapies:  Physical Therapy: Yes  Chiropractor: Yes  Massage Therapy: No  TENS: No  Neck or back surgery: No  Past pain management: Yes - [facility]    Back Pain  This is a chronic problem. The current episode started more than 1 year ago. The problem occurs intermittently. The problem is unchanged. The pain is present in the lumbar spine. The quality of the pain is described as aching. The pain radiates to the left buttock. The pain is at a severity of 2/10. The pain is moderate. The " symptoms are aggravated by position and standing (climbing stairs). Pertinent negatives include no abdominal pain, chest pain, dysuria, fever, headaches, numbness or weakness. He has tried chiropractic manipulation, bed rest and home exercises (Tylenol, PT) for the symptoms.     PEG Assessment   What number best describes your pain on average in the past week?4  What number best describes how, during the past week, pain has interfered with your enjoyment of life?3  What number best describes how, during the past week, pain has interfered with your general activity?  5    Review of Pertinent Medical Data ---  MRI LUMBAR SPINE WITHOUT CONTRAST     HISTORY: Low back pain, melanoma, prostate cancer.     COMPARISON: No prior MRI examination of the lumbar spine is available  for comparison.     FINDINGS: There is levoscoliosis of the lumbar spine with apex at the  level of L4 with a compensatory dextroscoliosis at L1. There is  moderate-to-severe loss of disc height and disc desiccation from T12 to  L4. Loss of disc height is more prominent to the left at T12-L1 and  L1-L2 and to the right at L3-L4. The conus is at L1 and the caudal  aspect of the spinal cord appears unremarkable.     There is fusiform aneurysmal enlargement of the infrarenal abdominal  aorta which is only partially visualized. This measures approximately  4.7 cm in the maximum transverse dimension. The aorta measured  approximately 2.4 cm in maximum transverse dimension on the CT  examination of the abdomen and pelvis from 03/24/2004.     A fatty filum terminale is appreciated. This extends from L3 to L5-S1.  There is prominent epidural fat consistent with epidural lipomatosis  from L2 to L5.     T12-L1: A central and left paramedian inferiorly directed disc  herniation is appreciated which extends to the junction of the superior  and middle thirds of the L1 vertebral body. Mild foraminal stenosis is  present bilaterally secondary to extension of a disc  osteophyte complex  into the neural foramen.     L1-L2: Mild-to-moderate facet degenerative disease is present  bilaterally. A mild broad-based disc osteophyte complex is present which  results in mild canal stenosis. There is moderate-to-severe foraminal  stenosis on the left secondary to loss of disc height and extension of a  disc osteophyte complex into the neural foramen.     L2-L3: Severe central canal stenosis secondary to a mild broad-based  disc osteophyte complex and epidural lipomatosis, further accentuated by  mild-to-moderate and moderate-to-severe facet degenerative disease at  the left and right respectively. Mild foraminal stenosis is present on  the left. There is moderate-to-severe foraminal stenosis on the right  secondary to facet hypertrophy and to a lesser extent extension of disc  material into the neural foramen.     L3-L4: Moderate facet degenerative disease is present bilaterally. There  is moderate-to-severe central canal stenosis secondary to the epidural  lipomatosis and a mild broad-based disc osteophyte complex.  Moderate-to-severe foraminal stenosis is present on the right secondary  to loss of disc height and extension of a disc osteophyte complex into  the neural foramen.     L4-L5: There is moderate-to-severe if not severe central canal stenosis  secondary to a broad-based disc osteophyte complex which is more  prominent to the left, mild facet ligamentum flavum hypertrophy and  epidural lipomatosis. Mild foraminal stenosis is present bilaterally  secondary to extension of a disc osteophyte complex into the neural  foramen.     L5-S1: A mild broad-based disc osteophyte complex is present.  Moderate-to-severe facet degenerative disease is present bilaterally.  Fluid is present within the facets bilaterally. A synovial cyst is  present on the left contributing to mild foraminal stenosis. It  approaches the posterior aspect of the left L5 nerve as the nerve exits  the neural foramen  but does not definitively involve the nerve.     IMPRESSION:  1.  There is a fusiform infrarenal abdominal aortic aneurysm which  measures 4.7 cm in transverse dimension. It is only partially  visualized. The aorta measured 2.4 cm in the transverse dimension at the  same level on the CT examination of the abdomen and pelvis performed on  03/24/2004.  2.  There is an inferiorly directed disc herniation to the left of  midline at T12-L1 which extends inferiorly to the junction of the  superior and middle thirds of the L1 vertebral body.  3.  Multilevel degenerative disease involving the lumbar spine is noted  as described in detail above including severe canal stenosis at L2-L3,  L3-L4 and moderate-to-severe if not severe canal stenosis at L4-L5. This  is secondary to broad-based disc osteophyte complexes, posterior element  degenerative disease and epidural lipomatosis. A synovial cyst is  present on the left at L5-S1 approaching but does not definitively  involve the left L5 nerve as it exits the neural foramen.  4.  A fatty filum terminale is noted.     The above information was called to and discussed with Dr. Staples on  04/06/2023 at 1245 hours.     This report was finalized on 4/7/2023 5:09 PM by Dr. Richard Altamirano M.D.     The following portions of the patient's history were reviewed and updated as appropriate: allergies, current medications, past family history, past medical history, past social history, past surgical history, and problem list.    Review of Systems   Constitutional:  Negative for activity change, chills, fatigue and fever.   HENT:  Negative for congestion.    Eyes:  Negative for visual disturbance.   Respiratory:  Negative for chest tightness and shortness of breath.    Cardiovascular:  Negative for chest pain.   Gastrointestinal:  Negative for abdominal pain, constipation and diarrhea.   Genitourinary:  Negative for difficulty urinating and dysuria.   Musculoskeletal:  Positive for back  "pain.   Neurological:  Negative for dizziness, weakness, light-headedness, numbness and headaches.   Psychiatric/Behavioral:  Negative for agitation, self-injury, sleep disturbance and suicidal ideas. The patient is not nervous/anxious.      I have reviewed and confirmed the accuracy of the ROS as documented by the MA/LPN/RN JF Anderson    Vitals:    11/12/24 1508   BP: 104/66   Temp: 98 °F (36.7 °C)   Weight: 69.9 kg (154 lb 3.2 oz)   Height: 167.6 cm (66\")   PainSc:   2   PainLoc: Back     Objective   Physical Exam  Constitutional:       Appearance: Normal appearance.   HENT:      Head: Normocephalic.   Cardiovascular:      Rate and Rhythm: Normal rate and regular rhythm.   Pulmonary:      Effort: Pulmonary effort is normal.      Breath sounds: Normal breath sounds.   Musculoskeletal:         General: Normal range of motion.      Cervical back: Normal range of motion.      Lumbar back: Tenderness and bony tenderness present. Decreased range of motion.      Comments: + lumbar facet  loading/tenderness  + Left SI joint tenderness  - Left RONN's test     Skin:     General: Skin is warm and dry.      Capillary Refill: Capillary refill takes less than 2 seconds.      Comments: Ace wrap to LLE    Neurological:      General: No focal deficit present.      Mental Status: He is alert and oriented to person, place, and time.   Psychiatric:         Mood and Affect: Mood normal.         Behavior: Behavior normal.         Thought Content: Thought content normal.         Cognition and Memory: Cognition normal.       Assessment & Plan   Diagnoses and all orders for this visit:    1. Chronic left SI joint pain (Primary)    2. Sacroiliitis    3. Lumbosacral pain, chronic    4. Degeneration of intervertebral disc of lumbar region with discogenic back pain    5. Spinal stenosis of lumbar region with neurogenic claudication      Moises Miranda reports a pain score of 2.  Given his pain assessment as noted, treatment options " were discussed and the following options were decided upon as a follow-up plan to address the patient's pain: continuation of current treatment plan for pain.    --- Patient asked about repeating his SI joint injection.  Discussed that he would be a candidate for this but he would need to be free of infection and off of all antibiotics prior to this procedure.  Patient verbalized understanding and will contact our office when he is able to safely proceed with another injection.  --- Follow-up as needed    Pain / Disability Scale  The scale used for measurement of pain and/or disability for this patient was the [place] back pain disability scale.  The score was 7 on 11/12/2024     VIET REPORT  As the clinician, I personally reviewed the VIET from 11/12/24 while the patient was in the office today.    Dictated utilizing Dragon dictation.

## 2024-11-15 ENCOUNTER — APPOINTMENT (OUTPATIENT)
Dept: WOUND CARE | Facility: HOSPITAL | Age: 79
End: 2024-11-15
Payer: MEDICARE

## 2024-11-19 ENCOUNTER — APPOINTMENT (OUTPATIENT)
Dept: WOUND CARE | Facility: HOSPITAL | Age: 79
End: 2024-11-19
Payer: MEDICARE

## 2024-11-19 PROCEDURE — G0463 HOSPITAL OUTPT CLINIC VISIT: HCPCS

## 2024-11-19 PROCEDURE — 97602 WOUND(S) CARE NON-SELECTIVE: CPT

## 2024-11-20 ENCOUNTER — OFFICE VISIT (OUTPATIENT)
Dept: SURGERY | Facility: CLINIC | Age: 79
End: 2024-11-20
Payer: MEDICARE

## 2024-11-20 VITALS
RESPIRATION RATE: 16 BRPM | WEIGHT: 154 LBS | SYSTOLIC BLOOD PRESSURE: 128 MMHG | HEIGHT: 66 IN | HEART RATE: 80 BPM | DIASTOLIC BLOOD PRESSURE: 74 MMHG | BODY MASS INDEX: 24.75 KG/M2

## 2024-11-20 DIAGNOSIS — S81.802A WOUND OF LEFT LOWER EXTREMITY, INITIAL ENCOUNTER: Primary | ICD-10-CM

## 2024-11-20 NOTE — H&P (VIEW-ONLY)
"Moises Miranda 79 y.o. male presents @ the req of [facility] for eval of hematoma LLE.  Pt reports no injury stating \"It just appeared.\"  No chief complaint on file.            HPI   This very interesting 79-year-old was referred from the wound care center with a wound of his left lower extremity.  It just appeared out of nowhere.  He was seen in May and the orthopedic office when it was a fluid-filled mass.  An MRI was obtained which showed a possible seroma, hematoma, or ganglioma.  The area was opened and drained.  Since that time he has completed multiple courses of antibiotics and been evaluated the wound care center.  There is still a small hole that drains daily.  He has had no known trauma to the area.  He has no knee or extremity pain.  He has no fevers or chills or evidence of infection.  He is still going out hunting and is quite active.  He says this is interfering with his lifestyle.      Review of Systems   All other systems reviewed and are negative.            Current Outpatient Medications:     alendronate (FOSAMAX) 70 MG tablet, Take 1 tablet by mouth Every 7 (Seven) Days., Disp: , Rfl:     aspirin 81 MG EC tablet, 1 tablet Daily., Disp: , Rfl:     atorvastatin (LIPITOR) 20 MG tablet, Take 1 tablet by mouth., Disp: , Rfl:     carvedilol (COREG) 12.5 MG tablet, TAKE 1/2 TABLET TWICE DAILY WITH MEALS, Disp: , Rfl:     doxycycline (VIBRAMYCIN) 100 MG capsule, Take 1 capsule by mouth 2 (Two) Times a Day., Disp: 20 capsule, Rfl: 0    fluticasone (FLONASE) 50 MCG/ACT nasal spray, 2 sprays into the nostril(s) as directed by provider Daily., Disp: 16 g, Rfl: 0    folic acid (FOLVITE) 1 MG tablet, Take 1 tablet by mouth 3 (Three) Times a Day., Disp: , Rfl:     loratadine (CLARITIN) 10 MG tablet, Take 1 tablet by mouth Daily., Disp: 30 tablet, Rfl: 0    methotrexate 2.5 MG tablet, , Disp: , Rfl:     mupirocin (BACTROBAN) 2 % ointment, Apply 1 Application topically to the appropriate area as directed 2 " (Two) Times a Day., Disp: , Rfl:     omeprazole (priLOSEC) 20 MG capsule, Take 1 capsule by mouth Daily., Disp: 90 capsule, Rfl: 3    Pediatric Multivitamins-Iron (multivitamin chewable) chewable tablet, 1 tablet Daily., Disp: , Rfl:     predniSONE (DELTASONE) 5 MG tablet, Take 1 tablet by mouth Daily., Disp: , Rfl:     sacubitril-valsartan (Entresto) 24-26 MG tablet, Take 1 tablet by mouth 2 (Two) Times a Day., Disp: 60 tablet, Rfl: 3    spironolactone (ALDACTONE) 25 MG tablet, , Disp: , Rfl:     tamsulosin (FLOMAX) 0.4 MG capsule 24 hr capsule, Take 1 capsule by mouth Daily., Disp: , Rfl:         Allergies   Allergen Reactions    Meloxicam Other (See Comments)     Doesn't remember the reaction            Past Medical History:   Diagnosis Date    Allergic     Anxiety     Benign prostatic hyperplasia     Bulbous urethral 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    Cancer     melanoma    Cataract     CHF (congestive heart failure)     Cholelithiasis     Colon polyp     Depression     Diverticulosis     Erectile dysfunction     GERD (gastroesophageal reflux disease)     HL (hearing loss)     Hyperlipidemia     Hypertension     Kidney stone     Low back pain     Rheumatoid arthritis     Visual impairment            Past Surgical History:   Procedure Laterality Date    CARDIAC DEFIBRILLATOR PLACEMENT      COLONOSCOPY  04 25    5 polops    COLONOSCOPY W/ POLYPECTOMY N/A 05/10/2024    Procedure: COLONOSCOPY WITH POLYPECTOMY;  Surgeon: Richard Dillon MD;  Location: [facility];  Service: Gastroenterology;  Laterality: N/A;  diverticulosis; sigmoid polyp x2 (cold snare); ascending polyp x1 (forceps); transverse polyp x1 (cold snare)    CYSTOSCOPY URETHROTOMY VISUAL INTERNAL N/A 09/28/2022    Procedure: CYSTOSCOPY WITH INTERNAL VISUAL URETHROTOMY;  Surgeon: Jose E Miranda MD;  Location: [facility];  Service: Urology;  Laterality: N/A;    EYE SURGERY      PROSTATE SURGERY      SACROILIAC JOINT INJECTION Left 10/15/2024    SKIN  CANCER EXCISION  03/15/2013           Social History     Tobacco Use    Smoking status: Former     Current packs/day: 0.00     Average packs/day: 1 pack/day for 15.0 years (15.0 ttl pk-yrs)     Types: Cigarettes     Quit date: 10/19/1990     Years since quittin.1    Smokeless tobacco: Never    Tobacco comments:     quit    Vaping Use    Vaping status: Never Used   Substance Use Topics    Alcohol use: Not Currently     Alcohol/week: 3.0 standard drinks of alcohol     Types: 3 Cans of beer per week     Comment: few beers a week    Drug use: No           Immunization History   Administered Date(s) Administered    Arexvy (RSV, Adults 60+ yrs) 2024    COVID-19 (MODERNA) 12YRS+ (SPIKEVAX) 2024, 2024    COVID-19 (PFIZER) BIVALENT 12+YRS 10/18/2022    COVID-19 (PFIZER) Purple Cap Monovalent 2021, 2021, 10/14/2021    Fluzone High-Dose 65+YRS 2022    Pneumococcal Conjugate 13-Valent (PCV13) 2022    Shingrix 2023    Tdap 2022           Physical Exam  Vitals and nursing note reviewed.   Constitutional:       Appearance: Normal appearance.   HENT:      Head: Normocephalic and atraumatic.   Cardiovascular:      Rate and Rhythm: Normal rate and regular rhythm.   Pulmonary:      Effort: Pulmonary effort is normal.      Breath sounds: Normal breath sounds.   Abdominal:      General: Bowel sounds are normal.      Palpations: Abdomen is soft.   Musculoskeletal:      Comments: On the lateral part of his left lower extremity below the knee is a several centimeter sized fluid area with a small hole in the bottom.  When I squeezed the area thick clearish fluid came out.  The skin shows no with erythema and there was no tenderness noted.   Neurological:      General: No focal deficit present.      Mental Status: He is alert and oriented to person, place, and time.   Psychiatric:         Mood and Affect: Mood normal.         Behavior: Behavior normal.  "        Debilities/Disabilities Identified: None    Emotional Behavior: Appropriate      /74   Pulse 80   Resp 16   Ht 167.6 cm (66\")   Wt 69.9 kg (154 lb)   BMI 24.86 kg/m²         Diagnoses and all orders for this visit:    1. Wound of left lower extremity, initial encounter (Primary)    I am unsure exactly what this is.  It is not a hematoma and the fluid appears too thick to be a seroma.  I discussed with Moises taking him to surgery to explore the wound and open it and wash it out and pack it.  I will do this on a day when orthopedic surgery is operating so I can have them evaluate the wound if it involves the gastrocnemius muscle.  We discussed it would have to heal by secondary intention and after we take him to surgery we would start the process of getting wound VAC.  All of Moises's questions were answered and he and his wife are willing to proceed.    Thank you for allowing me to participate in the care of this interesting patient.         "

## 2024-11-20 NOTE — PROGRESS NOTES
"Moises Miranda 79 y.o. male presents @ the req of [facility] for eval of hematoma LLE.  Pt reports no injury stating \"It just appeared.\"  No chief complaint on file.            HPI   This very interesting 79-year-old was referred from the wound care center with a wound of his left lower extremity.  It just appeared out of nowhere.  He was seen in May and the orthopedic office when it was a fluid-filled mass.  An MRI was obtained which showed a possible seroma, hematoma, or ganglioma.  The area was opened and drained.  Since that time he has completed multiple courses of antibiotics and been evaluated the wound care center.  There is still a small hole that drains daily.  He has had no known trauma to the area.  He has no knee or extremity pain.  He has no fevers or chills or evidence of infection.  He is still going out hunting and is quite active.  He says this is interfering with his lifestyle.      Review of Systems   All other systems reviewed and are negative.            Current Outpatient Medications:     alendronate (FOSAMAX) 70 MG tablet, Take 1 tablet by mouth Every 7 (Seven) Days., Disp: , Rfl:     aspirin 81 MG EC tablet, 1 tablet Daily., Disp: , Rfl:     atorvastatin (LIPITOR) 20 MG tablet, Take 1 tablet by mouth., Disp: , Rfl:     carvedilol (COREG) 12.5 MG tablet, TAKE 1/2 TABLET TWICE DAILY WITH MEALS, Disp: , Rfl:     doxycycline (VIBRAMYCIN) 100 MG capsule, Take 1 capsule by mouth 2 (Two) Times a Day., Disp: 20 capsule, Rfl: 0    fluticasone (FLONASE) 50 MCG/ACT nasal spray, 2 sprays into the nostril(s) as directed by provider Daily., Disp: 16 g, Rfl: 0    folic acid (FOLVITE) 1 MG tablet, Take 1 tablet by mouth 3 (Three) Times a Day., Disp: , Rfl:     loratadine (CLARITIN) 10 MG tablet, Take 1 tablet by mouth Daily., Disp: 30 tablet, Rfl: 0    methotrexate 2.5 MG tablet, , Disp: , Rfl:     mupirocin (BACTROBAN) 2 % ointment, Apply 1 Application topically to the appropriate area as directed 2 " (Two) Times a Day., Disp: , Rfl:     omeprazole (priLOSEC) 20 MG capsule, Take 1 capsule by mouth Daily., Disp: 90 capsule, Rfl: 3    Pediatric Multivitamins-Iron (multivitamin chewable) chewable tablet, 1 tablet Daily., Disp: , Rfl:     predniSONE (DELTASONE) 5 MG tablet, Take 1 tablet by mouth Daily., Disp: , Rfl:     sacubitril-valsartan (Entresto) 24-26 MG tablet, Take 1 tablet by mouth 2 (Two) Times a Day., Disp: 60 tablet, Rfl: 3    spironolactone (ALDACTONE) 25 MG tablet, , Disp: , Rfl:     tamsulosin (FLOMAX) 0.4 MG capsule 24 hr capsule, Take 1 capsule by mouth Daily., Disp: , Rfl:         Allergies   Allergen Reactions    Meloxicam Other (See Comments)     Doesn't remember the reaction            Past Medical History:   Diagnosis Date    Allergic     Anxiety     Benign prostatic hyperplasia     Bulbous urethral 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    Cancer     melanoma    Cataract     CHF (congestive heart failure)     Cholelithiasis     Colon polyp     Depression     Diverticulosis     Erectile dysfunction     GERD (gastroesophageal reflux disease)     HL (hearing loss)     Hyperlipidemia     Hypertension     Kidney stone     Low back pain     Rheumatoid arthritis     Visual impairment            Past Surgical History:   Procedure Laterality Date    CARDIAC DEFIBRILLATOR PLACEMENT      COLONOSCOPY  04 25    5 polops    COLONOSCOPY W/ POLYPECTOMY N/A 05/10/2024    Procedure: COLONOSCOPY WITH POLYPECTOMY;  Surgeon: Richard Dillon MD;  Location: [facility];  Service: Gastroenterology;  Laterality: N/A;  diverticulosis; sigmoid polyp x2 (cold snare); ascending polyp x1 (forceps); transverse polyp x1 (cold snare)    CYSTOSCOPY URETHROTOMY VISUAL INTERNAL N/A 09/28/2022    Procedure: CYSTOSCOPY WITH INTERNAL VISUAL URETHROTOMY;  Surgeon: Jose E Miranda MD;  Location: [facility];  Service: Urology;  Laterality: N/A;    EYE SURGERY      PROSTATE SURGERY      SACROILIAC JOINT INJECTION Left 10/15/2024    SKIN  CANCER EXCISION  03/15/2013           Social History     Tobacco Use    Smoking status: Former     Current packs/day: 0.00     Average packs/day: 1 pack/day for 15.0 years (15.0 ttl pk-yrs)     Types: Cigarettes     Quit date: 10/19/1990     Years since quittin.1    Smokeless tobacco: Never    Tobacco comments:     quit    Vaping Use    Vaping status: Never Used   Substance Use Topics    Alcohol use: Not Currently     Alcohol/week: 3.0 standard drinks of alcohol     Types: 3 Cans of beer per week     Comment: few beers a week    Drug use: No           Immunization History   Administered Date(s) Administered    Arexvy (RSV, Adults 60+ yrs) 2024    COVID-19 (MODERNA) 12YRS+ (SPIKEVAX) 2024, 2024    COVID-19 (PFIZER) BIVALENT 12+YRS 10/18/2022    COVID-19 (PFIZER) Purple Cap Monovalent 2021, 2021, 10/14/2021    Fluzone High-Dose 65+YRS 2022    Pneumococcal Conjugate 13-Valent (PCV13) 2022    Shingrix 2023    Tdap 2022           Physical Exam  Vitals and nursing note reviewed.   Constitutional:       Appearance: Normal appearance.   HENT:      Head: Normocephalic and atraumatic.   Cardiovascular:      Rate and Rhythm: Normal rate and regular rhythm.   Pulmonary:      Effort: Pulmonary effort is normal.      Breath sounds: Normal breath sounds.   Abdominal:      General: Bowel sounds are normal.      Palpations: Abdomen is soft.   Musculoskeletal:      Comments: On the lateral part of his left lower extremity below the knee is a several centimeter sized fluid area with a small hole in the bottom.  When I squeezed the area thick clearish fluid came out.  The skin shows no with erythema and there was no tenderness noted.   Neurological:      General: No focal deficit present.      Mental Status: He is alert and oriented to person, place, and time.   Psychiatric:         Mood and Affect: Mood normal.         Behavior: Behavior normal.  "        Debilities/Disabilities Identified: None    Emotional Behavior: Appropriate      /74   Pulse 80   Resp 16   Ht 167.6 cm (66\")   Wt 69.9 kg (154 lb)   BMI 24.86 kg/m²         Diagnoses and all orders for this visit:    1. Wound of left lower extremity, initial encounter (Primary)    I am unsure exactly what this is.  It is not a hematoma and the fluid appears too thick to be a seroma.  I discussed with Moises taking him to surgery to explore the wound and open it and wash it out and pack it.  I will do this on a day when orthopedic surgery is operating so I can have them evaluate the wound if it involves the gastrocnemius muscle.  We discussed it would have to heal by secondary intention and after we take him to surgery we would start the process of getting wound VAC.  All of Moises's questions were answered and he and his wife are willing to proceed.    Thank you for allowing me to participate in the care of this interesting patient.         "

## 2024-11-22 ENCOUNTER — PATIENT ROUNDING (BHMG ONLY) (OUTPATIENT)
Dept: SURGERY | Facility: CLINIC | Age: 79
End: 2024-11-22
Payer: MEDICARE

## 2024-11-22 ENCOUNTER — PRE-ADMISSION TESTING (OUTPATIENT)
Dept: PREADMISSION TESTING | Facility: HOSPITAL | Age: 79
End: 2024-11-22
Payer: MEDICARE

## 2024-11-22 VITALS
HEIGHT: 66 IN | WEIGHT: 154.8 LBS | BODY MASS INDEX: 24.88 KG/M2 | DIASTOLIC BLOOD PRESSURE: 71 MMHG | OXYGEN SATURATION: 98 % | HEART RATE: 73 BPM | SYSTOLIC BLOOD PRESSURE: 109 MMHG | RESPIRATION RATE: 12 BRPM

## 2024-11-22 LAB
ANION GAP SERPL CALCULATED.3IONS-SCNC: 10.1 MMOL/L (ref 5–15)
BUN SERPL-MCNC: 20 MG/DL (ref 8–23)
BUN/CREAT SERPL: 19 (ref 7–25)
CALCIUM SPEC-SCNC: 9.7 MG/DL (ref 8.6–10.5)
CHLORIDE SERPL-SCNC: 108 MMOL/L (ref 98–107)
CO2 SERPL-SCNC: 24.9 MMOL/L (ref 22–29)
CREAT SERPL-MCNC: 1.05 MG/DL (ref 0.76–1.27)
EGFRCR SERPLBLD CKD-EPI 2021: 72.7 ML/MIN/1.73
GLUCOSE SERPL-MCNC: 112 MG/DL (ref 65–99)
POTASSIUM SERPL-SCNC: 3.8 MMOL/L (ref 3.5–5.2)
QT INTERVAL: 456 MS
QTC INTERVAL: 476 MS
SODIUM SERPL-SCNC: 143 MMOL/L (ref 136–145)

## 2024-11-22 PROCEDURE — 80048 BASIC METABOLIC PNL TOTAL CA: CPT | Performed by: SURGERY

## 2024-11-22 PROCEDURE — 93005 ELECTROCARDIOGRAM TRACING: CPT

## 2024-11-22 PROCEDURE — 36415 COLL VENOUS BLD VENIPUNCTURE: CPT

## 2024-11-22 NOTE — DISCHARGE INSTRUCTIONS
PRE-ADMISSION TESTING INSTRUCTIONS FOR ADULTS    Take these medications the morning of surgery with a small sip of water: Carvedilol, Omeprazole, Methotrexate, Tamsulosin      Do not take any insulin or diabetes medications the morning of surgery.      No aspirin, advil, aleve, ibuprofen, naproxen, diet pills, decongestants, or herbal/vitamins for a week prior to surgery.       Tylenol/Acetaminophen is okay to take if needed.    General Instructions:    DO NOT EAT SOLID FOOD AFTER MIDNIGHT THE NIGHT BEFORE SURGERY. No gum, mints, or hard candy after midnight the night before surgery.  You may drink clear liquids the day of surgery up until 2 hours before your arrival time.  Clear liquids are liquids you can see through. Nothing RED in color.    Plain water    Sports drinks      Gelatin (Jell-O)  Fruit juices without pulp such as white grape juice and apple juice  Popsicles that contain no fruit or yogurt  Tea or coffee (no cream or milk added)    It is beneficial for you to have a clear drink that contains carbohydrates 2 hours before your arrival time.  We suggest a 20 ounce bottle of Gatorade or Powerade for non-diabetic patients or a 20 ounce bottle of Gatorade Zero or Powerade Zero for diabetic patients.     Patients who avoid smoking, chewing tobacco and alcohol for 4 weeks prior to surgery have a reduced risk of post-operative complications.  If at all possible, quit smoking as many days before surgery as you can.    Do not smoke, use chewing tobacco or drink alcohol the day of surgery    Bring your C-PAP/ BI-PAP machine if you use one.  Wear clean comfortable clothes.  Do not wear contact lenses, lotion, deodorant, or make-up.  Bring a case for your glasses if applicable. You may brush your teeth the morning of surgery.  You may wear dentures/partials, do not put adhesive/glue on them.  Leave all other jewelry and valuables at home.      Preventing a Surgical Site Infection:    Shower the night before and on  the morning of surgery using the chlorhexidine soap you were given.  Use a clean washcloth with the soap.  Place clean sheets on your bed after showering the night before surgery. Do not use the CHG soap on your hair, face, or private areas. Wash your body gently for five (5) minutes. Do not scrub your skin.  Dry with a clean towel and dress in clean clothing.  Do not shave the surgical area for 10 days-2 weeks prior to surgery  because the razor can irritate skin and make it easier to develop an infection.  Make sure you, your family, and all healthcare providers clean their hands with soap and water or an alcohol based hand  before caring for you or your wound.      Day of surgery:    Your surgeon’s office will advise you of your arrival time for the day of surgery.    Upon arrival, a Pre-op nurse and Anesthesia provider will review your health history, obtain vital signs, and answer questions you may have. The anesthesia provider will also discuss the type of anesthesia that will be needed for your procedure, which may include general anesthesia. The only belongings needed at this time will be your home medications and if applicable your C-PAP/BI-PAP machine.  If you are staying overnight your family can leave the rest of your belongings in the car and bring them to your room later.  A Pre-op nurse will start an IV and you may receive medication in preparation for surgery, including something to help you relax.  Your family will be able to see you in the Pre-op area.  While you are in surgery your family should notify the waiting room  if they leave the waiting room area and provide a contact phone number.    IF you have any questions, you can call the Pre-Admission Department at [phone] or your surgeon's office.  Notify your surgeon if  you become sick, have a fever, productive cough, or cannot be here the day of surgery    Please be aware that surgery does come with discomfort.  We  want to make every effort to control your discomfort so please discuss any uncontrolled symptoms with your nurse.   Your doctor will most likely have prescribed pain medications.      If you are going home after surgery, you will receive individualized written care instructions before being discharged.  A responsible adult (over the age of 18) must drive you to and from the hospital on the day of your surgery and stay with you for 24 hours after anesthesia.    If you are staying overnight following surgery, you will be transported to your hospital room following the recovery period.  [facility] has all private rooms.    You may receive a survey regarding the care you received. Your feedback is very important and will be used to collect the necessary data to help us to continue to provide excellent care.     Deductibles and co-payments are collected on the day of service. Please be prepared to pay the required co-pay, deductible or deposit on the day of service as defined by your plan.

## 2024-11-22 NOTE — PROGRESS NOTES
4E Post Discharge Call Back Program.  Unable to make call, invalid phone number provided. November 22, 2024    Hello, may I speak with Moises Miranda?    My name is Enrique      [facility]  [facility]  [address] [place] [id number] [phone].    Before we get started may I verify your date of birth? 1945    I am calling to officially welcome you to our practice and ask about your recent visit. Is this a good time to talk? yes    Tell me about your visit with us. What things went well?  everything went well.        We're always looking for ways to make our patients' experiences even better. Do you have recommendations on ways we may improve?  no    Overall were you satisfied with your first visit to our practice? yes       I appreciate you taking the time to speak with me today. Is there anything else I can do for you? no      Thank you, and have a great day.

## 2024-11-25 ENCOUNTER — ANESTHESIA EVENT (OUTPATIENT)
Dept: PERIOP | Facility: HOSPITAL | Age: 79
End: 2024-11-25
Payer: MEDICARE

## 2024-11-26 ENCOUNTER — ANESTHESIA (OUTPATIENT)
Dept: PERIOP | Facility: HOSPITAL | Age: 79
End: 2024-11-26
Payer: MEDICARE

## 2024-11-26 ENCOUNTER — HOSPITAL ENCOUNTER (OUTPATIENT)
Facility: HOSPITAL | Age: 79
Setting detail: HOSPITAL OUTPATIENT SURGERY
Discharge: HOME | End: 2024-11-26
Attending: SURGERY | Admitting: SURGERY
Payer: MEDICARE

## 2024-11-26 VITALS
SYSTOLIC BLOOD PRESSURE: 105 MMHG | TEMPERATURE: 97.8 F | WEIGHT: 152.8 LBS | HEART RATE: 61 BPM | HEIGHT: 66 IN | RESPIRATION RATE: 18 BRPM | DIASTOLIC BLOOD PRESSURE: 63 MMHG | BODY MASS INDEX: 24.56 KG/M2 | OXYGEN SATURATION: 97 %

## 2024-11-26 DIAGNOSIS — S81.802A WOUND OF LEFT LOWER EXTREMITY, INITIAL ENCOUNTER: ICD-10-CM

## 2024-11-26 PROCEDURE — 25010000002 FENTANYL CITRATE (PF) 50 MCG/ML SOLUTION: Performed by: NURSE ANESTHETIST, CERTIFIED REGISTERED

## 2024-11-26 PROCEDURE — 25010000002 PROPOFOL 200 MG/20ML EMULSION: Performed by: NURSE ANESTHETIST, CERTIFIED REGISTERED

## 2024-11-26 PROCEDURE — 25010000002 DEXAMETHASONE PER 1 MG

## 2024-11-26 PROCEDURE — 25010000002 CEFAZOLIN PER 500 MG: Performed by: SURGERY

## 2024-11-26 PROCEDURE — 10140 I&D HMTMA SEROMA/FLUID COLLJ: CPT | Performed by: SURGERY

## 2024-11-26 PROCEDURE — 25010000002 LIDOCAINE 2% SOLUTION: Performed by: NURSE ANESTHETIST, CERTIFIED REGISTERED

## 2024-11-26 PROCEDURE — 25010000002 LIDOCAINE 1% - EPINEPHRINE 1:100000 1 %-1:100000 SOLUTION 20 ML VIAL: Performed by: SURGERY

## 2024-11-26 PROCEDURE — 25810000003 LACTATED RINGERS PER 1000 ML

## 2024-11-26 PROCEDURE — 25010000002 ONDANSETRON PER 1 MG

## 2024-11-26 PROCEDURE — 25010000002 BUPIVACAINE (PF) 0.5 % SOLUTION 30 ML VIAL: Performed by: SURGERY

## 2024-11-26 PROCEDURE — 25010000002 PHENYLEPHRINE 10 MG/ML SOLUTION: Performed by: ANESTHESIOLOGY

## 2024-11-26 RX ORDER — OXYCODONE AND ACETAMINOPHEN 5; 325 MG/1; MG/1
1 TABLET ORAL ONCE AS NEEDED
Status: DISCONTINUED | OUTPATIENT
Start: 2024-11-26 | End: 2024-11-26 | Stop reason: HOSPADM

## 2024-11-26 RX ORDER — SODIUM CHLORIDE 0.9 % (FLUSH) 0.9 %
10 SYRINGE (ML) INJECTION EVERY 12 HOURS SCHEDULED
Status: DISCONTINUED | OUTPATIENT
Start: 2024-11-26 | End: 2024-11-26 | Stop reason: HOSPADM

## 2024-11-26 RX ORDER — FENTANYL CITRATE 50 UG/ML
INJECTION, SOLUTION INTRAMUSCULAR; INTRAVENOUS AS NEEDED
Status: DISCONTINUED | OUTPATIENT
Start: 2024-11-26 | End: 2024-11-26 | Stop reason: SURG

## 2024-11-26 RX ORDER — LIDOCAINE HYDROCHLORIDE 10 MG/ML
0.5 INJECTION, SOLUTION EPIDURAL; INFILTRATION; INTRACAUDAL; PERINEURAL ONCE AS NEEDED
Status: DISCONTINUED | OUTPATIENT
Start: 2024-11-26 | End: 2024-11-26 | Stop reason: HOSPADM

## 2024-11-26 RX ORDER — SODIUM CHLORIDE 0.9 % (FLUSH) 0.9 %
10 SYRINGE (ML) INJECTION AS NEEDED
Status: DISCONTINUED | OUTPATIENT
Start: 2024-11-26 | End: 2024-11-26 | Stop reason: HOSPADM

## 2024-11-26 RX ORDER — PHENYLEPHRINE HYDROCHLORIDE 10 MG/ML
INJECTION INTRAVENOUS AS NEEDED
Status: DISCONTINUED | OUTPATIENT
Start: 2024-11-26 | End: 2024-11-26 | Stop reason: SURG

## 2024-11-26 RX ORDER — SODIUM CHLORIDE 9 MG/ML
40 INJECTION, SOLUTION INTRAVENOUS AS NEEDED
Status: DISCONTINUED | OUTPATIENT
Start: 2024-11-26 | End: 2024-11-26 | Stop reason: HOSPADM

## 2024-11-26 RX ORDER — DEXAMETHASONE SODIUM PHOSPHATE 10 MG/ML
8 INJECTION INTRAMUSCULAR; INTRAVENOUS ONCE AS NEEDED
Status: COMPLETED | OUTPATIENT
Start: 2024-11-26 | End: 2024-11-26

## 2024-11-26 RX ORDER — ONDANSETRON 2 MG/ML
4 INJECTION INTRAMUSCULAR; INTRAVENOUS ONCE AS NEEDED
Status: DISCONTINUED | OUTPATIENT
Start: 2024-11-26 | End: 2024-11-26 | Stop reason: HOSPADM

## 2024-11-26 RX ORDER — SODIUM CHLORIDE, SODIUM LACTATE, POTASSIUM CHLORIDE, CALCIUM CHLORIDE 600; 310; 30; 20 MG/100ML; MG/100ML; MG/100ML; MG/100ML
100 INJECTION, SOLUTION INTRAVENOUS ONCE
Status: DISCONTINUED | OUTPATIENT
Start: 2024-11-26 | End: 2024-11-26 | Stop reason: HOSPADM

## 2024-11-26 RX ORDER — FAMOTIDINE 10 MG/ML
20 INJECTION, SOLUTION INTRAVENOUS
Status: COMPLETED | OUTPATIENT
Start: 2024-11-26 | End: 2024-11-26

## 2024-11-26 RX ORDER — PROPOFOL 10 MG/ML
INJECTION, EMULSION INTRAVENOUS CONTINUOUS PRN
Status: DISCONTINUED | OUTPATIENT
Start: 2024-11-26 | End: 2024-11-26 | Stop reason: SURG

## 2024-11-26 RX ORDER — ONDANSETRON 2 MG/ML
4 INJECTION INTRAMUSCULAR; INTRAVENOUS ONCE AS NEEDED
Status: COMPLETED | OUTPATIENT
Start: 2024-11-26 | End: 2024-11-26

## 2024-11-26 RX ORDER — SODIUM CHLORIDE, SODIUM LACTATE, POTASSIUM CHLORIDE, CALCIUM CHLORIDE 600; 310; 30; 20 MG/100ML; MG/100ML; MG/100ML; MG/100ML
9 INJECTION, SOLUTION INTRAVENOUS CONTINUOUS
Status: DISCONTINUED | OUTPATIENT
Start: 2024-11-26 | End: 2024-11-26 | Stop reason: HOSPADM

## 2024-11-26 RX ORDER — LIDOCAINE HYDROCHLORIDE 20 MG/ML
INJECTION, SOLUTION INFILTRATION; PERINEURAL AS NEEDED
Status: DISCONTINUED | OUTPATIENT
Start: 2024-11-26 | End: 2024-11-26 | Stop reason: SURG

## 2024-11-26 RX ADMIN — PHENYLEPHRINE HYDROCHLORIDE 100 MCG: 10 INJECTION INTRAVENOUS at 10:23

## 2024-11-26 RX ADMIN — PROPOFOL 30 MG: 10 INJECTION, EMULSION INTRAVENOUS at 10:01

## 2024-11-26 RX ADMIN — FAMOTIDINE 20 MG: 10 INJECTION, SOLUTION INTRAVENOUS at 07:56

## 2024-11-26 RX ADMIN — SODIUM CHLORIDE, POTASSIUM CHLORIDE, SODIUM LACTATE AND CALCIUM CHLORIDE 9 ML/HR: 600; 310; 30; 20 INJECTION, SOLUTION INTRAVENOUS at 07:56

## 2024-11-26 RX ADMIN — PHENYLEPHRINE HYDROCHLORIDE 100 MCG: 10 INJECTION INTRAVENOUS at 10:34

## 2024-11-26 RX ADMIN — PHENYLEPHRINE HYDROCHLORIDE 100 MCG: 10 INJECTION INTRAVENOUS at 10:26

## 2024-11-26 RX ADMIN — ONDANSETRON 4 MG: 2 INJECTION INTRAMUSCULAR; INTRAVENOUS at 07:57

## 2024-11-26 RX ADMIN — PROPOFOL 80 MCG/KG/MIN: 10 INJECTION, EMULSION INTRAVENOUS at 10:00

## 2024-11-26 RX ADMIN — DEXAMETHASONE SODIUM PHOSPHATE 8 MG: 10 INJECTION INTRAMUSCULAR; INTRAVENOUS at 07:56

## 2024-11-26 RX ADMIN — PHENYLEPHRINE HYDROCHLORIDE 100 MCG: 10 INJECTION INTRAVENOUS at 10:31

## 2024-11-26 RX ADMIN — PHENYLEPHRINE HYDROCHLORIDE 100 MCG: 10 INJECTION INTRAVENOUS at 10:11

## 2024-11-26 RX ADMIN — CEFAZOLIN 2000 MG: 2 INJECTION, POWDER, FOR SOLUTION INTRAVENOUS at 10:02

## 2024-11-26 RX ADMIN — PHENYLEPHRINE HYDROCHLORIDE 100 MCG: 10 INJECTION INTRAVENOUS at 10:16

## 2024-11-26 RX ADMIN — FENTANYL CITRATE 50 MCG: 50 INJECTION, SOLUTION INTRAMUSCULAR; INTRAVENOUS at 10:00

## 2024-11-26 RX ADMIN — PROPOFOL 30 MG: 10 INJECTION, EMULSION INTRAVENOUS at 10:07

## 2024-11-26 RX ADMIN — LIDOCAINE HYDROCHLORIDE 80 MG: 20 INJECTION, SOLUTION INFILTRATION; PERINEURAL at 10:00

## 2024-11-26 NOTE — ANESTHESIA PREPROCEDURE EVALUATION
Anesthesia Evaluation     Patient summary reviewed and Nursing notes reviewed   no history of anesthetic complications:   NPO Solid Status: > 8 hours  NPO Liquid Status: > 2 hours           Airway   Mallampati: III  TM distance: >3 FB  Neck ROM: full  No difficulty expected  Dental    (+) edentulous, upper dentures and lower dentures    Pulmonary     breath sounds clear to auscultation  (+) a smoker (quit >35 years ago) Former, cigarettes,  Cardiovascular   Exercise tolerance: good (4-7 METS)    ECG reviewed  Rhythm: irregular  Rate: normal    (+) pacemaker (medtronic biventricular ICD) ICD, pacemaker interrogated 6-12 months ago, hypertension (carvedilol, spironolactone, entresto) 2 medications or greater, CAD, dysrhythmias (h/o Non-sustained VT\), CHF , hyperlipidemia (lipitor)    ROS comment: Ischemic cardiomyopathy  CAD  Infrarenal AAA 4.4 cm as of 2022    Saw cardiologist 03/2024. Rec for f/u annually.     CT/AP 2018:  1. No etiology for hematuria is identified.  2. Posttreatment changes in the prostate. Mildly thickened, trabeculated  bladder wall most likely related to chronic bladder outflow obstruction.  The bladder is nondistended, and there is no evidence of upper urinary  obstruction.  is.  3. Probable tiny benign hemangioma right hepatic lobe.    EKG:  HEART RATE=66  bpm  RR Uraszxri=925  ms  PA Interval=32  ms  P Horizontal Axis=-4  deg  P Front Axis=72  deg  QRSD Vbcbyjko=783  ms  QT Llqktgqa=512  ms  DNsI=779  ms  QRS Axis=-90  deg  T Wave Axis=92  deg  - ABNORMAL ECG -  A-V dual-paced rhythm with some inhibition  No change from prior tracing  Electronically Signed By: Nelson Kramer ([facility]) 2024-11-22 15:57:45  Date and Time of Study:2024-11-22 13:36:42    EP procedure 01/2022:   1. Upgrade of single chamber ICD to BiV-ICD via addition of RA lead and LV   lead   Indications: Chronic systolic CHF nYHA III,EF 30-35%, RBBB, high burden of   RV pacing     ECHO 2021:   Mod MR  Trace TR, PA  EF 34%  Severe LV  "dilation        PE comment: AV pacer dependent    Neuro/Psych- negative ROS  GI/Hepatic/Renal/Endo    (+) GERD (prilosec), liver disease (Probable tiny benign hemangioma right hepatic lobe per CT), renal disease- stones  (-) diabetes (pre-dm in chart, but not sure)    ROS Comment: H/o colon polyps  Diverticulosis      Musculoskeletal     (+) back pain      ROS comment: Lumbar spinal stenosis    Abdominal    Substance History      OB/GYN          Other   arthritis (rheumatoid arthritis),chronic steroid use (\"took of prednisone about a month ago\")    history of cancer (prostate 2022 (surgery), BCC 2012)                  Anesthesia Plan    ASA 4     MAC, regional and general     (General as backup)    Anesthetic plan, risks, benefits, and alternatives have been provided, discussed and informed consent has been obtained with: patient and other (JOSE RAMON present).  Pre-procedure education provided  Use of blood products discussed with patient and other  Consented to blood products.    Plan discussed with CRNA.    CODE STATUS:         "

## 2024-11-26 NOTE — OP NOTE
GENERAL SURGERY :  Lori  Moises Miranda  1945    Procedure Date: 11/26/24    Pre-op Diagnosis: Chronically draining wound of the left lower extremity    Post-op Diagnosis: Chronically draining wound of left lower extremity most likely secondary to a seroma    Procedure: Wound exploration with removal of seroma capsule of left lower extremity    Surgeon: Lori    Assistant: Jud Pickett was responsible for performing the following activities: Closing and Placing Dressing and their skilled assistance was necessary for the success of this case.  Dr. Franco Lynn was also present for intraoperative consultation      Estimated Blood Loss: Minimal    Complications: None    Specimen: None    Findings: After opening the wound there was some seroma capsule located on the fascia which was peeled off.  The fascia was intact and there was no herniation of muscle.  There were no signs of trauma or infection.    Clinical Note: Moises presented my office with complaints of a chronically draining wound to the left lower extremity.  He was referred by the wound care center.  After reviewing all the notes and films we discussed taking him to surgery for wound exploration.  We discussed the benefits of surgery and limited to including: Bleeding, infection, recurrence, having to heal by secondary intention, anesthesia complications.  All of his questions were answered and he was willing to proceed.    Procedure: Moises was taken the operative suite and placed in supine position.  He was given MAC anesthesia with appropriate cardiopulmonary.  He was then prepped and draped in the usual sterile fashion.  After appropriate timeout was performed local was injected.  I then placed my scissors into the hole that was draining and extended the incision superiorly for several centimeters.  I then  the subcutaneous tissue from the fascia.  I explored the area with Dr. Lynn.  The seroma tissue was peeled from the fascia  until only good fascia was noted.  This was done several centimeters circumferentially around the incision.  There appeared to be no fascial injury.  The nerve was intact.  Copious irrigation was carried out.  More local was injected.  The incision was then closed in a layered fashion using 3-0 Monocryl and 5-0 PDS suture.  A carlos enrique dressing was then placed and a pressure dressing was applied.  Moises was then taken back to his room in stable postoperative condition having tolerated his procedure well.        Anali Sandoval DO  10:33 EST

## 2024-11-26 NOTE — INTERVAL H&P NOTE
"  H&P reviewed. The patient was examined and there are no changes to the H&P.      /72 (BP Location: Left arm, Patient Position: Lying)   Pulse 63   Temp 97.5 °F (36.4 °C) (Oral)   Resp 20   Ht 167.6 cm (66\")   Wt 69.3 kg (152 lb 12.8 oz)   SpO2 99%   BMI 24.66 kg/m²         "

## 2024-11-26 NOTE — ANESTHESIA POSTPROCEDURE EVALUATION
Patient: Moises Miranda    Procedure Summary       Date: 11/26/24 Room / Location:  [facility] /  [facility]    Anesthesia Start: 0952 Anesthesia Stop: 1053    Procedure: WOUND EXPLORATION Left LOWER EXTREMITy (Left: Abdomen) Diagnosis:       Wound of left lower extremity, initial encounter      (Wound of left lower extremity, initial encounter [S81.802A])    Surgeons: Anali Sandoval DO Provider: Gina Portillo MD    Anesthesia Type: MAC, regional, general ASA Status: 4            Anesthesia Type: MAC, regional, general    Vitals  Vitals Value Taken Time   /58 11/26/24 1120   Temp 97.8 °F (36.6 °C) 11/26/24 1051   Pulse 64 11/26/24 1130   Resp 21 11/26/24 1120   SpO2 97 % 11/26/24 1130   Vitals shown include unfiled device data.        Post Anesthesia Care and Evaluation    Patient location during evaluation: PHASE II  Patient participation: complete - patient participated  Level of consciousness: awake and alert  Pain score: 0  Pain management: adequate    Airway patency: patent  Anesthetic complications: No anesthetic complications  PONV Status: none  Cardiovascular status: acceptable  Respiratory status: acceptable  Hydration status: acceptable

## 2024-12-04 ENCOUNTER — OFFICE VISIT (OUTPATIENT)
Dept: SURGERY | Facility: CLINIC | Age: 79
End: 2024-12-04
Payer: MEDICARE

## 2024-12-04 DIAGNOSIS — S81.802A WOUND OF LEFT LOWER EXTREMITY, INITIAL ENCOUNTER: Primary | ICD-10-CM

## 2024-12-04 DIAGNOSIS — M79.89 MASS OF SOFT TISSUE OF KNEE: Primary | ICD-10-CM

## 2024-12-04 NOTE — LETTER
December 4, 2024     Keegan Segura MD  [address]  [facility] [address]    Patient: Moises Miranda   YOB: 1945   Date of Visit: 12/4/2024     Dear Keegan Segura MD:       Thank you for referring Moises Miranda to me for evaluation. Below are the relevant portions of my assessment and plan of care.    If you have questions, please do not hesitate to call me. I look forward to following Moises along with you.         Sincerely,        Anali Sandoval DO        CC: No Recipients    Anali Sandoval DO  12/04/24 1428  Sign when Signing Visit  Moises Miranda 79 y.o. male presents for PO FU I&D LLE.  Pt states he removed the surg dressing today and noticed a knot.        HPI     Above noted and agree.  Moises removed his dressing today and noticed that the fluid has returned.  He has no pain.    Review of Systems        Past Medical History:   Diagnosis Date   • Allergic    • Anxiety    • Benign prostatic hyperplasia    • Bulbous urethral 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   • Cancer     melanoma   • Cataract    • CHF (congestive heart failure)    • Cholelithiasis    • Colon polyp    • Defibrillator     has had for 4-5 yrs. Dr. Shanks cardiologist   • Depression    • Diverticulosis    • Elevated cholesterol    • Erectile dysfunction    • GERD (gastroesophageal reflux disease)    • HL (hearing loss)    • Hyperlipidemia    • Hypertension    • Kidney stone    • Low back pain    • Prostate cancer     2022   • Rheumatoid arthritis    • Visual impairment            Past Surgical History:   Procedure Laterality Date   • CARDIAC DEFIBRILLATOR PLACEMENT     • COLONOSCOPY  04 25 5 polops   • COLONOSCOPY W/ POLYPECTOMY N/A 05/10/2024    Procedure: COLONOSCOPY WITH POLYPECTOMY;  Surgeon: Richard Dillon MD;  Location: [facility];  Service: Gastroenterology;  Laterality: N/A;  diverticulosis; sigmoid polyp x2 (cold snare); ascending polyp x1 (forceps); transverse polyp x1 (cold snare)   • CYSTOSCOPY URETHROTOMY VISUAL  INTERNAL N/A 09/28/2022    Procedure: CYSTOSCOPY WITH INTERNAL VISUAL URETHROTOMY;  Surgeon: Jose E Miranda MD;  Location:  [facility];  Service: Urology;  Laterality: N/A;   • EXCISION MASS LEG Left 11/26/2024    Procedure: WOUND EXPLORATION Left LOWER EXTREMITy;  Surgeon: Anali Sandoval DO;  Location:  [facility];  Service: General;  Laterality: Left;   • EYE SURGERY     • PROSTATE SURGERY     • SACROILIAC JOINT INJECTION Left 10/15/2024   • SKIN CANCER EXCISION  03/15/2013           Physical Exam  Constitutional:       Appearance: Normal appearance.   Musculoskeletal:      Comments: On examination of his left leg, the fluid has returned.  I discussed with him opening the area up and packing it.  The area was prepped and draped and  after local was injected the incision was opened.  A large amount of thick clear fluid was removed.  The wound was then packed with 1 inch iodoform gauze and covered in a sterile dressing.   Neurological:      General: No focal deficit present.      Mental Status: He is alert and oriented to person, place, and time.   Psychiatric:         Mood and Affect: Mood normal.         Behavior: Behavior normal.             There were no vitals taken for this visit.        Diagnoses and all orders for this visit:    1. Mass of soft tissue of knee (Primary)    Unfortunately Moises is going to have to heal by secondary intention.  I will have him follow up to the wound care center for packing changes until we can get a wound vac set up for him.    Thank you for allowing me to participate in the care of this interesting patient.

## 2024-12-04 NOTE — PROGRESS NOTES
Moises Miranda 79 y.o. male presents for PO FU I&D LLE.  Pt states he removed the surg dressing today and noticed a knot.        HPI     Above noted and agree.  Moises removed his dressing today and noticed that the fluid has returned.  He has no pain.    Review of Systems        Past Medical History:   Diagnosis Date    Allergic     Anxiety     Benign prostatic hyperplasia     Bulbous urethral 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    Cancer     melanoma    Cataract     CHF (congestive heart failure)     Cholelithiasis     Colon polyp     Defibrillator     has had for 4-5 yrs. Dr. Shanks cardiologist    Depression     Diverticulosis     Elevated cholesterol     Erectile dysfunction     GERD (gastroesophageal reflux disease)     HL (hearing loss)     Hyperlipidemia     Hypertension     Kidney stone     Low back pain     Prostate cancer     2022    Rheumatoid arthritis     Visual impairment            Past Surgical History:   Procedure Laterality Date    CARDIAC DEFIBRILLATOR PLACEMENT      COLONOSCOPY  04 25 5 polops    COLONOSCOPY W/ POLYPECTOMY N/A 05/10/2024    Procedure: COLONOSCOPY WITH POLYPECTOMY;  Surgeon: Richard Dillon MD;  Location: [facility] OR;  Service: Gastroenterology;  Laterality: N/A;  diverticulosis; sigmoid polyp x2 (cold snare); ascending polyp x1 (forceps); transverse polyp x1 (cold snare)    CYSTOSCOPY URETHROTOMY VISUAL INTERNAL N/A 09/28/2022    Procedure: CYSTOSCOPY WITH INTERNAL VISUAL URETHROTOMY;  Surgeon: Jose E Miranda MD;  Location: [facility] OR;  Service: Urology;  Laterality: N/A;    EXCISION MASS LEG Left 11/26/2024    Procedure: WOUND EXPLORATION Left LOWER EXTREMITy;  Surgeon: Anali Sandoval DO;  Location: [facility] OR;  Service: General;  Laterality: Left;    EYE SURGERY      PROSTATE SURGERY      SACROILIAC JOINT INJECTION Left 10/15/2024    SKIN CANCER EXCISION  03/15/2013           Physical Exam  Constitutional:       Appearance: Normal appearance.   Musculoskeletal:       Comments: On examination of his left leg, the fluid has returned.  I discussed with him opening the area up and packing it.  The area was prepped and draped and  after local was injected the incision was opened.  A large amount of thick clear fluid was removed.  The wound was then packed with 1 inch iodoform gauze and covered in a sterile dressing.   Neurological:      General: No focal deficit present.      Mental Status: He is alert and oriented to person, place, and time.   Psychiatric:         Mood and Affect: Mood normal.         Behavior: Behavior normal.             There were no vitals taken for this visit.        Diagnoses and all orders for this visit:    1. Mass of soft tissue of knee (Primary)    Unfortunately Moises is going to have to heal by secondary intention.  I will have him follow up to the wound care center for packing changes until we can get a wound vac set up for him.    Thank you for allowing me to participate in the care of this interesting patient.

## 2024-12-05 DIAGNOSIS — M79.89 MASS OF SOFT TISSUE OF KNEE: Primary | ICD-10-CM

## 2024-12-06 ENCOUNTER — HOSPITAL ENCOUNTER (EMERGENCY)
Facility: HOSPITAL | Age: 79
Discharge: HOME | End: 2024-12-06
Attending: EMERGENCY MEDICINE | Admitting: EMERGENCY MEDICINE
Payer: MEDICARE

## 2024-12-06 ENCOUNTER — TELEPHONE (OUTPATIENT)
Dept: SURGERY | Facility: CLINIC | Age: 79
End: 2024-12-06
Payer: MEDICARE

## 2024-12-06 VITALS
OXYGEN SATURATION: 94 % | HEART RATE: 67 BPM | RESPIRATION RATE: 17 BRPM | TEMPERATURE: 98 F | WEIGHT: 152.78 LBS | SYSTOLIC BLOOD PRESSURE: 116 MMHG | BODY MASS INDEX: 24.55 KG/M2 | HEIGHT: 66 IN | DIASTOLIC BLOOD PRESSURE: 79 MMHG

## 2024-12-06 DIAGNOSIS — M79.605 PAIN OF LEFT LOWER EXTREMITY: Primary | ICD-10-CM

## 2024-12-06 DIAGNOSIS — L03.116 CELLULITIS OF LEFT LOWER EXTREMITY: ICD-10-CM

## 2024-12-06 PROCEDURE — 87205 SMEAR GRAM STAIN: CPT | Performed by: SURGERY

## 2024-12-06 PROCEDURE — 99283 EMERGENCY DEPT VISIT LOW MDM: CPT | Performed by: EMERGENCY MEDICINE

## 2024-12-06 PROCEDURE — 87070 CULTURE OTHR SPECIMN AEROBIC: CPT | Performed by: SURGERY

## 2024-12-06 RX ORDER — HYDROCODONE BITARTRATE AND ACETAMINOPHEN 7.5; 325 MG/1; MG/1
1 TABLET ORAL EVERY 6 HOURS PRN
Qty: 12 TABLET | Refills: 0 | Status: SHIPPED | OUTPATIENT
Start: 2024-12-06

## 2024-12-06 RX ORDER — DOXYCYCLINE 100 MG/1
100 CAPSULE ORAL EVERY 12 HOURS
Qty: 14 CAPSULE | Refills: 0 | Status: SHIPPED | OUTPATIENT
Start: 2024-12-06

## 2024-12-06 NOTE — ED NOTES
"Dr Paulino at bedside. Packing removed from wound by MD. Wound culture collected by MD. ALEXANDER Paulino \"irrigate wound with NS, pack with iodine gauze, dress with non adherent dressing, dry gauze, mc and ace wrap\".   "

## 2024-12-06 NOTE — TELEPHONE ENCOUNTER
Spoke with pt and he reports he is having significant pain and can barely walk.  Pt advised to go to [facility] ER.

## 2024-12-06 NOTE — PROGRESS NOTES
I was asked see Moises in the emergency department.  He said he woke up with pain and since he had never had any pain he got nervous.  I removed the packing.  The fascia and subcutaneous tissue appeared healthy without signs of infection.  I did obtain a culture.  The wound was then irrigated and repacked and covered with sterile dressing.

## 2024-12-06 NOTE — ED PROVIDER NOTES
Subjective   History of Present Illness  Patient with chronic wound on left lower leg.  Saw Dr. Gloria had surgery 10 days ago..  Followed up at wound clinic and they packed it.  Patient's not had pain for 2 or 3 days.  Called the surgeons office and they advised to go to the ER.  Patient denies any streaking redness, swelling, drainage or discharge and no fever.  No therapy taken prior to arrival.  Nothing seems to make it better or worse.  Patient is not supposed to follow-up with surgery anymore and is supposed to follow-up with wound care.  His next appointment is in 4 days.  No history of DVT.  No calf pain.  No chest pain or shortness of breath.        Review of Systems   All other systems reviewed and are negative.      Past Medical History:   Diagnosis Date    Allergic     Anxiety     Benign prostatic hyperplasia     Bulbous urethral 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    Cancer     melanoma    Cataract     CHF (congestive heart failure)     Cholelithiasis     Colon polyp     Defibrillator     has had for 4-5 yrs. Dr. Shanks cardiologist    Depression     Diverticulosis     Elevated cholesterol     Erectile dysfunction     GERD (gastroesophageal reflux disease)     HL (hearing loss)     Hyperlipidemia     Hypertension     Kidney stone     Low back pain     Prostate cancer     2022    Rheumatoid arthritis     Visual impairment        Allergies   Allergen Reactions    Meloxicam Other (See Comments)     Doesn't remember the reaction        Past Surgical History:   Procedure Laterality Date    CARDIAC DEFIBRILLATOR PLACEMENT      COLONOSCOPY  04 25 5 polops    COLONOSCOPY W/ POLYPECTOMY N/A 05/10/2024    Procedure: COLONOSCOPY WITH POLYPECTOMY;  Surgeon: Richard Dillon MD;  Location: [facility];  Service: Gastroenterology;  Laterality: N/A;  diverticulosis; sigmoid polyp x2 (cold snare); ascending polyp x1 (forceps); transverse polyp x1 (cold snare)    CYSTOSCOPY URETHROTOMY VISUAL INTERNAL N/A 09/28/2022     Procedure: CYSTOSCOPY WITH INTERNAL VISUAL URETHROTOMY;  Surgeon: Jose E Miranda MD;  Location:  [facility];  Service: Urology;  Laterality: N/A;    EXCISION MASS LEG Left 2024    Procedure: WOUND EXPLORATION Left LOWER EXTREMITy;  Surgeon: Anali Sandoval DO;  Location:  [facility];  Service: General;  Laterality: Left;    EYE SURGERY      PROSTATE SURGERY      SACROILIAC JOINT INJECTION Left 10/15/2024    SKIN CANCER EXCISION  03/15/2013       Family History   Problem Relation Age of Onset    Cancer Brother     Hearing loss Brother     Alcohol abuse Maternal Grandmother     Hypertension Neg Hx     Malig Hyperthermia Neg Hx        Social History     Socioeconomic History    Marital status:    Tobacco Use    Smoking status: Former     Current packs/day: 0.00     Average packs/day: 1 pack/day for 15.0 years (15.0 ttl pk-yrs)     Types: Cigarettes     Quit date: 10/19/1990     Years since quittin.1    Smokeless tobacco: Never    Tobacco comments:     quit    Vaping Use    Vaping status: Never Used   Substance and Sexual Activity    Alcohol use: Not Currently     Alcohol/week: 3.0 standard drinks of alcohol     Types: 3 Cans of beer per week     Comment: rarely    Drug use: No    Sexual activity: Not Currently     Partners: Female           Objective   Physical Exam  Vitals and nursing note reviewed.   HENT:      Head: Normocephalic.      Right Ear: External ear normal.      Left Ear: External ear normal.      Nose: Nose normal.      Mouth/Throat:      Pharynx: Oropharynx is clear.   Eyes:      Conjunctiva/sclera: Conjunctivae normal.   Pulmonary:      Effort: Pulmonary effort is normal.   Musculoskeletal:      Cervical back: Neck supple.      Comments: Left lower leg has a wound on the proximal anterior lateral portion of the leg.  There is packing in place.  Mild erythema surrounding it.  No streaking redness or drainage or discharge.  Below where the wound was wrapped, patient does have  a +1 pitting edema of his ankle and foot.     Skin:     General: Skin is warm and dry.   Neurological:      Mental Status: He is alert and oriented to person, place, and time.   Psychiatric:         Mood and Affect: Mood normal.         Behavior: Behavior normal.         Procedures           ED Course  ED Course as of 12/06/24 1441   Fri Dec 06, 2024   1346 Discussed patient with Dr. Clark who is on-call for surgery.  She said that Dr. Sandoval is actually at [facility] today and she was going to contact her to see if she would come by and see the patient briefly. [AW]      ED Course User Index  [AW] Keegan Dudley MD                                                       Medical Decision Making  Ddx cellulitis, abscess, wound infection, normal postprocedural pain after surgery, necrotizing fasciitis    1440 Dr. Sandoval saw patient and wants patient to be put on some pain medicine as well as doxycycline.  Nurses have irrigated and redressed the wound.  Patient to follow-up next week.  All questions personally answered at the bedside and all d/c instructions personally reviewed with pt.  Discussed the importance of close outpt. f/u and pt. understands this and agrees to do so.  Pt agrees to return to ED immediately for any new, persistent, or worsening symptoms.    EMR Dragon/Transcription disclaimer:  Much of this encounter note is an electronic transcription/translation of spoken language to printed text, aka voice recognition.  The electronic translation of spoken language may permit erroneous or at times nonsensical words or phrases to be inadvertently transcribed; although I have reviewed the note for such errors, some may still exist so please interpret based on surrounding text content.      Problems Addressed:  Cellulitis of left lower extremity: complicated acute illness or injury  Pain of left lower extremity: complicated acute illness or injury    Risk  Prescription drug management.        Final  diagnoses:   Pain of left lower extremity   Cellulitis of left lower extremity       ED Disposition  ED Disposition       ED Disposition   Discharge    Condition   Stable    Comment   --               Anali Sandoval  [address] [place] [address] [phone]    In 1 week      [facility]  [address] [place] [address] [phone]  In 1 week           Medication List        New Prescriptions      doxycycline 100 MG capsule  Commonly known as: MONODOX  Take 1 capsule by mouth Every 12 (Twelve) Hours.     HYDROcodone-acetaminophen 7.5-325 MG per tablet  Commonly known as: NORCO  Take 1 tablet by mouth Every 6 (Six) Hours As Needed for Moderate Pain.            Changed      fluticasone 50 MCG/ACT nasal spray  Commonly known as: FLONASE  2 sprays into the nostril(s) as directed by provider Daily.  What changed:   when to take this  reasons to take this               Where to Get Your Medications        These medications were sent to [facility] [address] [place] [address] [address] [facility] [phone] [facility] [phone] [address] [address]      Phone: [phone]   doxycycline 100 MG capsule  HYDROcodone-acetaminophen 7.5-325 MG per tablet            Keegan Dudley MD  12/06/24 8175

## 2024-12-06 NOTE — TELEPHONE ENCOUNTER
Patient has waken up with his leg swollen and fll of fluid again, says he is in pain and can barely walk, but says no fever. Patient wants to know what he needs to do at this point?

## 2024-12-09 ENCOUNTER — OFFICE VISIT (OUTPATIENT)
Dept: SURGERY | Facility: CLINIC | Age: 79
End: 2024-12-09
Payer: MEDICARE

## 2024-12-09 VITALS
HEART RATE: 52 BPM | TEMPERATURE: 96.9 F | HEIGHT: 66 IN | WEIGHT: 150 LBS | DIASTOLIC BLOOD PRESSURE: 79 MMHG | SYSTOLIC BLOOD PRESSURE: 110 MMHG | BODY MASS INDEX: 24.11 KG/M2 | OXYGEN SATURATION: 95 %

## 2024-12-09 DIAGNOSIS — S81.802D WOUND OF LEFT LOWER EXTREMITY, SUBSEQUENT ENCOUNTER: Primary | ICD-10-CM

## 2024-12-09 LAB
BACTERIA SPEC AEROBE CULT: NORMAL
GRAM STN SPEC: NORMAL

## 2024-12-09 PROCEDURE — 1159F MED LIST DOCD IN RCRD: CPT | Performed by: SURGERY

## 2024-12-09 PROCEDURE — 99024 POSTOP FOLLOW-UP VISIT: CPT | Performed by: SURGERY

## 2024-12-09 PROCEDURE — 1160F RVW MEDS BY RX/DR IN RCRD: CPT | Performed by: SURGERY

## 2024-12-09 NOTE — PROGRESS NOTES
Surgery Progress Note   Chief Complaint: Hospital follow-up    Subjective     Interval History:     Moises is here following his surgery.  On November 26 he underwent a wound exploration for a chronically draining wound.  I did have Dr. Lynn of orthopedic surgery scrubbed into the case as well for second opinion.  He had been suffering from a chronically draining wound for over 6 months.  After his surgery he presented to the office with a reaccumulation of fluid.  I opened the incision and drained very thick fluid more consistent with synovial fluid.  On further discussion with Moises and his wife, he has been suffering with this for 2 years.  He had a cyst that was lateral to his knee that was drained under ultrasound guidance.  All of the pathology at that time showed synovial fluid.  All of his cultures have been negative.  We referred Moises for an MRI of the knee but due to him having metal in his body it will not be able to be done until February.  His wife said that she called and made an appointment with a knee specialist at [facility].  He also has an appointment with the wound care center tomorrow.      Objective     Vital Signs  Temp:  [96.9 °F (36.1 °C)] 96.9 °F (36.1 °C)  Heart Rate:  [52] 52  BP: (110)/(79) 110/79  Body mass index is 24.22 kg/m².       Physical Exam:   General: patient awake, alert and cooperative   Extremities: his wound looks good.  I removed the packing and placed more iodoform gauze into the wound, covered with an ABD, and wrapped it with kerlex and an ace wrap.   Neurologic: Normal mood and behavior     Results Review:     I reviewed the patient's new clinical results.        Assessment & Plan     Left lower extremity wound  I told Moises and his wife that I think a second opinion from a knee specialist is a great idea.  In my opinion I believe this is an accumulation of synovial fluid that is draining from his knee.  I encouraged him to keep the wound care appointment  tomorrow.  I told him I would be more than happy to discuss this case with his second opinion.  I will bring him back in 2 weeks.      Anali Sandoval DO  12/09/24  10:03 EST

## 2024-12-09 NOTE — LETTER
December 9, 2024     Keegan Segura MD  [address]  [facility] [address]    Patient: Moises Miranda   YOB: 1945   Date of Visit: 12/9/2024     Dear Keegan Segura MD:       Thank you for referring Moises Miranda to me for evaluation. Below are the relevant portions of my assessment and plan of care.    If you have questions, please do not hesitate to call me. I look forward to following Moises along with you.         Sincerely,        Anali Sandoval DO        CC: No Recipients    Anali Sandoval DO  12/09/24 1009  Sign when Signing Visit          Surgery Progress Note   Chief Complaint: Hospital follow-up    Subjective    Interval History:     Moises is here following his surgery.  On November 26 he underwent a wound exploration for a chronically draining wound.  I did have Dr. Lynn of orthopedic surgery scrubbed into the case as well for second opinion.  He had been suffering from a chronically draining wound for over 6 months.  After his surgery he presented to the office with a reaccumulation of fluid.  I opened the incision and drained very thick fluid more consistent with synovial fluid.  On further discussion with Moises and his wife, he has been suffering with this for 2 years.  He had a cyst that was lateral to his knee that was drained under ultrasound guidance.  All of the pathology at that time showed synovial fluid.  All of his cultures have been negative.  We referred Moises for an MRI of the knee but due to him having metal in his body it will not be able to be done until February.  His wife said that she called and made an appointment with a knee specialist at [facility].  He also has an appointment with the wound care center tomorrow.      Objective    Vital Signs  Temp:  [96.9 °F (36.1 °C)] 96.9 °F (36.1 °C)  Heart Rate:  [52] 52  BP: (110)/(79) 110/79  Body mass index is 24.22 kg/m².       Physical Exam:   General: patient awake, alert and cooperative   Extremities: his wound looks  good.  I removed the packing and placed more iodoform gauze into the wound, covered with an ABD, and wrapped it with kerlex and an ace wrap.   Neurologic: Normal mood and behavior     Results Review:     I reviewed the patient's new clinical results.        Assessment & Plan    Left lower extremity wound  I told Moises and his wife that I think a second opinion from a knee specialist is a great idea.  In my opinion I believe this is an accumulation of synovial fluid that is draining from his knee.  I encouraged him to keep the wound care appointment tomorrow.  I told him I would be more than happy to discuss this case with his second opinion.  I will bring him back in 2 weeks.      Anali Sandoval DO  12/09/24  10:03 EST

## 2024-12-10 ENCOUNTER — APPOINTMENT (OUTPATIENT)
Dept: WOUND CARE | Facility: HOSPITAL | Age: 79
End: 2024-12-10
Payer: MEDICARE

## 2024-12-10 PROCEDURE — G0463 HOSPITAL OUTPT CLINIC VISIT: HCPCS

## 2024-12-16 ENCOUNTER — OFFICE VISIT (OUTPATIENT)
Dept: SURGERY | Facility: CLINIC | Age: 79
End: 2024-12-16
Payer: MEDICARE

## 2024-12-16 DIAGNOSIS — Z51.89 VISIT FOR WOUND CHECK: Primary | ICD-10-CM

## 2024-12-16 PROCEDURE — 1125F AMNT PAIN NOTED PAIN PRSNT: CPT | Performed by: SURGERY

## 2024-12-16 PROCEDURE — 1159F MED LIST DOCD IN RCRD: CPT | Performed by: SURGERY

## 2024-12-16 PROCEDURE — 1160F RVW MEDS BY RX/DR IN RCRD: CPT | Performed by: SURGERY

## 2024-12-16 NOTE — LETTER
December 16, 2024     Keegan Segura MD  [address]  [facility] [address]    Patient: Moises Miranda   YOB: 1945   Date of Visit: 12/16/2024     Dear Keegan Segura MD:       Thank you for referring Moises Miranda to me for evaluation. Below are the relevant portions of my assessment and plan of care.    If you have questions, please do not hesitate to call me. I look forward to following Moises along with you.         Sincerely,        Anali Sandoval DO        CC: No Recipients    Anali Sandoval DO  12/16/24 1018  Sign when Signing Visit  Moises Miranda 79 y.o. male presents for PO FU LLE.  Pt reports it is a little sore.  + drainage.        HPI   Moises is here for a wound check.  He saw the knee specialist at [facility] and said he did not need a knee replacement.  His pain is improved and his drainage is less.  He has completed his antibiotics.      Review of Systems        Past Medical History:   Diagnosis Date   • Allergic    • Anxiety    • Benign prostatic hyperplasia    • Bulbous urethral 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   • Cancer     melanoma   • Cataract    • CHF (congestive heart failure)    • Cholelithiasis    • Colon polyp    • Defibrillator     has had for 4-5 yrs. Dr. Shanks cardiologist   • Depression    • Diverticulosis    • Elevated cholesterol    • Erectile dysfunction    • GERD (gastroesophageal reflux disease)    • HL (hearing loss)    • Hyperlipidemia    • Hypertension    • Kidney stone    • Low back pain    • Prostate cancer     2022   • Rheumatoid arthritis    • Visual impairment            Past Surgical History:   Procedure Laterality Date   • CARDIAC DEFIBRILLATOR PLACEMENT     • COLONOSCOPY  04 25 5 polops   • COLONOSCOPY W/ POLYPECTOMY N/A 05/10/2024    Procedure: COLONOSCOPY WITH POLYPECTOMY;  Surgeon: Richard Dillon MD;  Location: [facility];  Service: Gastroenterology;  Laterality: N/A;  diverticulosis; sigmoid polyp x2 (cold snare); ascending polyp x1 (forceps);  transverse polyp x1 (cold snare)   • CYSTOSCOPY URETHROTOMY VISUAL INTERNAL N/A 09/28/2022    Procedure: CYSTOSCOPY WITH INTERNAL VISUAL URETHROTOMY;  Surgeon: Jose E Miranda MD;  Location: [facility] OR;  Service: Urology;  Laterality: N/A;   • EXCISION MASS LEG Left 11/26/2024    Procedure: WOUND EXPLORATION Left LOWER EXTREMITy;  Surgeon: Anali Sandoval DO;  Location: [facility] OR;  Service: General;  Laterality: Left;   • EYE SURGERY     • PROSTATE SURGERY     • SACROILIAC JOINT INJECTION Left 10/15/2024   • SKIN CANCER EXCISION  03/15/2013           Physical Exam  Constitutional:       Appearance: Normal appearance.   Skin:     Comments: The wound looks good.  We put new packing in and re-wrapped it.   Neurological:      General: No focal deficit present.      Mental Status: He is alert and oriented to person, place, and time.   Psychiatric:         Mood and Affect: Mood normal.         Behavior: Behavior normal.             There were no vitals taken for this visit.        Diagnoses and all orders for this visit:    1. Visit for wound check (Primary)    Moises is going to the wound care center tomorrow and we will see him again on 12/23.    Thank you for allowing me to participate in the care of this interesting patient.

## 2024-12-16 NOTE — PROGRESS NOTES
Moises Miranda 79 y.o. male presents for PO FU LLE.  Pt reports it is a little sore.  + drainage.        HPI   Moises is here for a wound check.  He saw the knee specialist at [facility] and said he did not need a knee replacement.  His pain is improved and his drainage is less.  He has completed his antibiotics.      Review of Systems        Past Medical History:   Diagnosis Date    Allergic     Anxiety     Benign prostatic hyperplasia     Bulbous urethral 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    Cancer     melanoma    Cataract     CHF (congestive heart failure)     Cholelithiasis     Colon polyp     Defibrillator     has had for 4-5 yrs. Dr. Shanks cardiologist    Depression     Diverticulosis     Elevated cholesterol     Erectile dysfunction     GERD (gastroesophageal reflux disease)     HL (hearing loss)     Hyperlipidemia     Hypertension     Kidney stone     Low back pain     Prostate cancer     2022    Rheumatoid arthritis     Visual impairment            Past Surgical History:   Procedure Laterality Date    CARDIAC DEFIBRILLATOR PLACEMENT      COLONOSCOPY  04 25 5 polops    COLONOSCOPY W/ POLYPECTOMY N/A 05/10/2024    Procedure: COLONOSCOPY WITH POLYPECTOMY;  Surgeon: Richard Dillon MD;  Location: [facility] OR;  Service: Gastroenterology;  Laterality: N/A;  diverticulosis; sigmoid polyp x2 (cold snare); ascending polyp x1 (forceps); transverse polyp x1 (cold snare)    CYSTOSCOPY URETHROTOMY VISUAL INTERNAL N/A 09/28/2022    Procedure: CYSTOSCOPY WITH INTERNAL VISUAL URETHROTOMY;  Surgeon: Jose E Miranda MD;  Location: [facility] OR;  Service: Urology;  Laterality: N/A;    EXCISION MASS LEG Left 11/26/2024    Procedure: WOUND EXPLORATION Left LOWER EXTREMITy;  Surgeon: Anali Sandoval DO;  Location: [facility] OR;  Service: General;  Laterality: Left;    EYE SURGERY      PROSTATE SURGERY      SACROILIAC JOINT INJECTION Left 10/15/2024    SKIN CANCER EXCISION  03/15/2013           Physical  Exam  Constitutional:       Appearance: Normal appearance.   Skin:     Comments: The wound looks good.  We put new packing in and re-wrapped it.   Neurological:      General: No focal deficit present.      Mental Status: He is alert and oriented to person, place, and time.   Psychiatric:         Mood and Affect: Mood normal.         Behavior: Behavior normal.             There were no vitals taken for this visit.        Diagnoses and all orders for this visit:    1. Visit for wound check (Primary)    Moises is going to the wound care center tomorrow and we will see him again on 12/23.    Thank you for allowing me to participate in the care of this interesting patient.

## 2024-12-17 ENCOUNTER — LAB REQUISITION (OUTPATIENT)
Dept: LAB | Facility: HOSPITAL | Age: 79
End: 2024-12-17
Payer: MEDICARE

## 2024-12-17 ENCOUNTER — APPOINTMENT (OUTPATIENT)
Dept: WOUND CARE | Facility: HOSPITAL | Age: 79
End: 2024-12-17
Payer: MEDICARE

## 2024-12-17 DIAGNOSIS — L03.116 CELLULITIS OF LEFT LOWER LIMB: ICD-10-CM

## 2024-12-17 PROCEDURE — 87205 SMEAR GRAM STAIN: CPT | Performed by: NURSE PRACTITIONER

## 2024-12-17 PROCEDURE — G0463 HOSPITAL OUTPT CLINIC VISIT: HCPCS

## 2024-12-17 PROCEDURE — 87070 CULTURE OTHR SPECIMN AEROBIC: CPT | Performed by: NURSE PRACTITIONER

## 2024-12-20 LAB
BACTERIA SPEC AEROBE CULT: NORMAL
GRAM STN SPEC: NORMAL
GRAM STN SPEC: NORMAL

## 2024-12-23 ENCOUNTER — TELEPHONE (OUTPATIENT)
Dept: SURGERY | Facility: CLINIC | Age: 79
End: 2024-12-23

## 2024-12-23 NOTE — TELEPHONE ENCOUNTER
"Caller: MirandaEnriqueta    Relationship:  Emergency Contact    Best call back number: [phone]  PT CAN BE REACHED AT ANYTIME, IF NO ANSWER A DETAILED MSG CAN BE LEFT.    PATIENT CALLED REQUESTING TO CANCEL SAME DAY APPT.    Did the patient call AFTER the start time of their scheduled appointment?  []YES  [x]NO    Was the patient's appointment rescheduled? [x]YES  []NO    Any additional information: PT'S DAUGHTER CAME IN FROM OUT OF TOWN UNEXPECTEDLY. PT'S RS'D APPT IS NOT UNTIL JAN 6th. PT CAN BE SEEN LATER TODAY IF POSSIBLE.   \"RADHA SARABIA\"    "

## 2024-12-24 ENCOUNTER — APPOINTMENT (OUTPATIENT)
Dept: WOUND CARE | Facility: HOSPITAL | Age: 79
End: 2024-12-24
Payer: MEDICARE

## 2024-12-24 PROCEDURE — G0463 HOSPITAL OUTPT CLINIC VISIT: HCPCS

## 2024-12-31 ENCOUNTER — APPOINTMENT (OUTPATIENT)
Dept: WOUND CARE | Facility: HOSPITAL | Age: 79
End: 2024-12-31
Payer: MEDICARE

## 2024-12-31 PROCEDURE — G0463 HOSPITAL OUTPT CLINIC VISIT: HCPCS

## 2025-01-07 ENCOUNTER — APPOINTMENT (OUTPATIENT)
Dept: WOUND CARE | Facility: HOSPITAL | Age: 80
End: 2025-01-07
Payer: MEDICARE

## 2025-01-07 PROCEDURE — 97605 NEG PRS WND THER DME<=50SQCM: CPT

## 2025-01-10 ENCOUNTER — APPOINTMENT (OUTPATIENT)
Dept: WOUND CARE | Facility: HOSPITAL | Age: 80
End: 2025-01-10
Payer: MEDICARE

## 2025-01-10 PROCEDURE — 97605 NEG PRS WND THER DME<=50SQCM: CPT

## 2025-01-13 ENCOUNTER — OFFICE VISIT (OUTPATIENT)
Dept: SURGERY | Facility: CLINIC | Age: 80
End: 2025-01-13
Payer: MEDICARE

## 2025-01-13 DIAGNOSIS — Z51.89 VISIT FOR WOUND CHECK: Primary | ICD-10-CM

## 2025-01-13 PROCEDURE — 1160F RVW MEDS BY RX/DR IN RCRD: CPT | Performed by: SURGERY

## 2025-01-13 PROCEDURE — 1159F MED LIST DOCD IN RCRD: CPT | Performed by: SURGERY

## 2025-01-13 PROCEDURE — 17250 CHEM CAUT OF GRANLTJ TISSUE: CPT | Performed by: SURGERY

## 2025-01-13 NOTE — LETTER
January 13, 2025     Keegan Segura MD  [address]  [facility] [address]    Patient: Moises Miranda   YOB: 1945   Date of Visit: 1/13/2025     Dear Keegan Segura MD:       Thank you for referring Moises Miranda to me for evaluation. Below are the relevant portions of my assessment and plan of care.    If you have questions, please do not hesitate to call me. I look forward to following Moises along with you.         Sincerely,        Anali Sandoval DO        CC: No Recipients    Anali Sandoval DO  01/13/25 1113  Sign when Signing Visit  Moises Miranda 79 y.o. male presents for FU LLE.  Pt is currently wearing wound vac and states he has appt tomorrow.       HPI   Above noted and agree.  Moises is doing OK.  His prednisone has been stopped and now he is having pain.  He is going to wound care and wearing his wound vac.        Review of Systems        Past Medical History:   Diagnosis Date   • Allergic    • Anxiety    • Benign prostatic hyperplasia    • Bulbous urethral 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   • Cancer     melanoma   • Cataract    • CHF (congestive heart failure)    • Cholelithiasis    • Colon polyp    • Defibrillator     has had for 4-5 yrs. Dr. Shanks cardiologist   • Depression    • Diverticulosis    • Elevated cholesterol    • Erectile dysfunction    • GERD (gastroesophageal reflux disease)    • HL (hearing loss)    • Hyperlipidemia    • Hypertension    • Kidney stone    • Low back pain    • Prostate cancer     2022   • Rheumatoid arthritis    • Visual impairment            Past Surgical History:   Procedure Laterality Date   • CARDIAC DEFIBRILLATOR PLACEMENT     • COLONOSCOPY  04 25 5 polops   • COLONOSCOPY W/ POLYPECTOMY N/A 05/10/2024    Procedure: COLONOSCOPY WITH POLYPECTOMY;  Surgeon: Richard Dillon MD;  Location: [facility];  Service: Gastroenterology;  Laterality: N/A;  diverticulosis; sigmoid polyp x2 (cold snare); ascending polyp x1 (forceps); transverse polyp x1 (cold snare)    • CYSTOSCOPY URETHROTOMY VISUAL INTERNAL N/A 09/28/2022    Procedure: CYSTOSCOPY WITH INTERNAL VISUAL URETHROTOMY;  Surgeon: Jose E Miranda MD;  Location: [facility];  Service: Urology;  Laterality: N/A;   • EXCISION MASS LEG Left 11/26/2024    Procedure: WOUND EXPLORATION Left LOWER EXTREMITy;  Surgeon: Anali Sandoval DO;  Location: [facility];  Service: General;  Laterality: Left;   • EYE SURGERY     • PROSTATE SURGERY     • SACROILIAC JOINT INJECTION Left 10/15/2024   • SKIN CANCER EXCISION  03/15/2013           Physical Exam    The wound has some slough on it which I treated with silver nitrate.  It is filling in nicely and there are no signs of infection.    There were no vitals taken for this visit.        Diagnoses and all orders for this visit:    1. Visit for wound check (Primary)    We discussed continuing the current regimen and I will see him back in 1 month.    Thank you for allowing me to participate in the care of this interesting patient.

## 2025-01-13 NOTE — PROGRESS NOTES
Moises Miranda 79 y.o. male presents for FU LLE.  Pt is currently wearing wound vac and states he has appt tomorrow.       HPI   Above noted and agree.  Moises is doing OK.  His prednisone has been stopped and now he is having pain.  He is going to wound care and wearing his wound vac.        Review of Systems        Past Medical History:   Diagnosis Date    Allergic     Anxiety     Benign prostatic hyperplasia     Bulbous urethral 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    Cancer     melanoma    Cataract     CHF (congestive heart failure)     Cholelithiasis     Colon polyp     Defibrillator     has had for 4-5 yrs. Dr. Shanks cardiologist    Depression     Diverticulosis     Elevated cholesterol     Erectile dysfunction     GERD (gastroesophageal reflux disease)     HL (hearing loss)     Hyperlipidemia     Hypertension     Kidney stone     Low back pain     Prostate cancer     2022    Rheumatoid arthritis     Visual impairment            Past Surgical History:   Procedure Laterality Date    CARDIAC DEFIBRILLATOR PLACEMENT      COLONOSCOPY  04 25 5 polops    COLONOSCOPY W/ POLYPECTOMY N/A 05/10/2024    Procedure: COLONOSCOPY WITH POLYPECTOMY;  Surgeon: Richard Dillon MD;  Location: [facility];  Service: Gastroenterology;  Laterality: N/A;  diverticulosis; sigmoid polyp x2 (cold snare); ascending polyp x1 (forceps); transverse polyp x1 (cold snare)    CYSTOSCOPY URETHROTOMY VISUAL INTERNAL N/A 09/28/2022    Procedure: CYSTOSCOPY WITH INTERNAL VISUAL URETHROTOMY;  Surgeon: Jose E Miranda MD;  Location: [facility] OR;  Service: Urology;  Laterality: N/A;    EXCISION MASS LEG Left 11/26/2024    Procedure: WOUND EXPLORATION Left LOWER EXTREMITy;  Surgeon: Anali Sandoval DO;  Location: [facility] OR;  Service: General;  Laterality: Left;    EYE SURGERY      PROSTATE SURGERY      SACROILIAC JOINT INJECTION Left 10/15/2024    SKIN CANCER EXCISION  03/15/2013           Physical Exam    The wound has some slough on it which I  treated with silver nitrate.  It is filling in nicely and there are no signs of infection.    There were no vitals taken for this visit.        Diagnoses and all orders for this visit:    1. Visit for wound check (Primary)    We discussed continuing the current regimen and I will see him back in 1 month.    Thank you for allowing me to participate in the care of this interesting patient.

## 2025-01-14 ENCOUNTER — APPOINTMENT (OUTPATIENT)
Dept: WOUND CARE | Facility: HOSPITAL | Age: 80
End: 2025-01-14
Payer: MEDICARE

## 2025-01-14 PROCEDURE — 97605 NEG PRS WND THER DME<=50SQCM: CPT

## 2025-01-21 ENCOUNTER — APPOINTMENT (OUTPATIENT)
Dept: WOUND CARE | Facility: HOSPITAL | Age: 80
End: 2025-01-21
Payer: MEDICARE

## 2025-01-21 PROCEDURE — 97605 NEG PRS WND THER DME<=50SQCM: CPT

## 2025-01-28 ENCOUNTER — APPOINTMENT (OUTPATIENT)
Dept: WOUND CARE | Facility: HOSPITAL | Age: 80
End: 2025-01-28
Payer: MEDICARE

## 2025-01-28 PROCEDURE — 17250 CHEM CAUT OF GRANLTJ TISSUE: CPT

## 2025-01-31 ENCOUNTER — APPOINTMENT (OUTPATIENT)
Dept: WOUND CARE | Facility: HOSPITAL | Age: 80
End: 2025-01-31
Payer: MEDICARE

## 2025-01-31 PROCEDURE — 97607 NEG PRS WND THR NDME<=50SQCM: CPT

## 2025-02-03 ENCOUNTER — APPOINTMENT (OUTPATIENT)
Dept: WOUND CARE | Facility: HOSPITAL | Age: 80
End: 2025-02-03
Payer: MEDICARE

## 2025-02-03 ENCOUNTER — OFFICE VISIT (OUTPATIENT)
Dept: INTERNAL MEDICINE | Facility: CLINIC | Age: 80
End: 2025-02-03
Payer: MEDICARE

## 2025-02-03 VITALS
BODY MASS INDEX: 24.59 KG/M2 | TEMPERATURE: 97.1 F | HEIGHT: 65 IN | SYSTOLIC BLOOD PRESSURE: 92 MMHG | WEIGHT: 147.6 LBS | OXYGEN SATURATION: 98 % | DIASTOLIC BLOOD PRESSURE: 60 MMHG | HEART RATE: 81 BPM | RESPIRATION RATE: 16 BRPM

## 2025-02-03 DIAGNOSIS — M79.89 MASS OF SOFT TISSUE OF KNEE: Primary | ICD-10-CM

## 2025-02-03 DIAGNOSIS — M05.9 RHEUMATOID ARTHRITIS WITH POSITIVE RHEUMATOID FACTOR, INVOLVING UNSPECIFIED SITE: ICD-10-CM

## 2025-02-03 DIAGNOSIS — I25.10 CORONARY ARTERY DISEASE INVOLVING NATIVE CORONARY ARTERY OF NATIVE HEART WITHOUT ANGINA PECTORIS: ICD-10-CM

## 2025-02-03 DIAGNOSIS — M04.8 OTHER AUTOINFLAMMATORY SYNDROMES: ICD-10-CM

## 2025-02-03 PROCEDURE — 99214 OFFICE O/P EST MOD 30 MIN: CPT | Performed by: INTERNAL MEDICINE

## 2025-02-03 PROCEDURE — 1125F AMNT PAIN NOTED PAIN PRSNT: CPT | Performed by: INTERNAL MEDICINE

## 2025-02-03 PROCEDURE — G2211 COMPLEX E/M VISIT ADD ON: HCPCS | Performed by: INTERNAL MEDICINE

## 2025-02-03 PROCEDURE — 97607 NEG PRS WND THR NDME<=50SQCM: CPT

## 2025-02-03 RX ORDER — FUROSEMIDE 20 MG/1
TABLET ORAL
COMMUNITY
Start: 2025-01-04

## 2025-02-03 NOTE — PROGRESS NOTES
"Chief Complaint  Hypotension    Subjective        Moises Miranda presents to [facility]  Hypotension    Blood pressures been low over the past couple of weeks it sounds like.  He is not significantly orthostatic.  Is been no recent changes in any medications.  He has been off the doxycycline for this chronic leg drainage.  He had an MRI of his knee and tib-fib which showed a cystic lesion likely.  Cultures from that have been negative.  He is apparently scheduled for an MRI tomorrow to see if there is still cystic mass in the soft tissue and muscle  Has a history of cardiomyopathy and echocardiogram at [facility] in 2021 showed an ejection fraction of 33%.  He has an AICD in place.  He is on Entresto, carvedilol and spironolactone.  Sounds like he takes the Lasix as needed.  There is been no changes in medication.  He has lost some weight over the past couple of months.  Not intentionally and feels good otherwise.  Feels like his rheumatoid arthritis is fairly well-controlled on a low-dose of methotrexate.  Had lab work done in November.  Objective   Vital Signs:  BP 92/60 (BP Location: Left arm, Patient Position: Sitting, Cuff Size: Large Adult)   Pulse 81   Temp 97.1 °F (36.2 °C) (Temporal)   Resp 16   Ht 165.1 cm (65\")   Wt 67 kg (147 lb 9.6 oz)   SpO2 98%   BMI 24.56 kg/m²   Estimated body mass index is 24.56 kg/m² as calculated from the following:    Height as of this encounter: 165.1 cm (65\").    Weight as of this encounter: 67 kg (147 lb 9.6 oz).    BMI is within normal parameters. No other follow-up for BMI required.      Physical Exam  Vitals and nursing note reviewed.   Constitutional:       Appearance: Normal appearance.   HENT:      Head: Normocephalic and atraumatic.   Cardiovascular:      Rate and Rhythm: Normal rate. Rhythm irregular.   Pulmonary:      Effort: Pulmonary effort is normal.      Breath sounds: Normal breath sounds.   Abdominal:      " General: Abdomen is flat.      Palpations: Abdomen is soft.   Musculoskeletal:         General: No swelling or deformity.      Cervical back: Neck supple.      Right lower leg: No edema.      Left lower leg: Edema present.      Comments: Mild edema in that left lower leg but very mild.  Chronic draining wound laterally approximately over the fibula.  Has a dressing over it currently   Skin:     General: Skin is warm.      Capillary Refill: Capillary refill takes less than 2 seconds.      Findings: No rash.   Neurological:      General: No focal deficit present.      Mental Status: He is alert and oriented to person, place, and time.        Result Review :                Assessment and Plan   Diagnoses and all orders for this visit:    1. Mass of soft tissue of knee (Primary)  -     CBC & Differential  -     Comprehensive Metabolic Panel  -     TSH  -     C-reactive Protein  -     Vitamin B12    2. Rheumatoid arthritis with positive rheumatoid factor, involving unspecified site  -     CBC & Differential  -     Comprehensive Metabolic Panel  -     TSH  -     C-reactive Protein  -     Vitamin B12    3. Coronary artery disease involving native coronary artery of native heart without angina pectoris  -     CBC & Differential  -     Comprehensive Metabolic Panel  -     TSH  -     C-reactive Protein  -     Vitamin B12    4. Other autoinflammatory syndromes  -     TSH    History of CAD and cardiomyopathy.  On Entresto, carvedilol and spironolactone.  Takes Lasix as needed.  He has not had an echocardiogram since 2021 so did encourage him to follow-up with his cardiologist in repeat that to get an idea of his ejection fraction.  Probably need to reduce his Entresto dose.  He is can take it once a day but I did encourage him to touch base with his cardiologist on that as well.  Not particular bradycardic on exam.  This chronic drainage from the lower leg on the left laterally.  The drainage looks serosanguineous and the  cultures were negative.  He has a repeat MRI for tomorrow.  Pending the results of that may need additional treatment.  Currently he just has dressing on followed by the wound clinic apparently in [place].         Follow Up   No follow-ups on file.  Patient was given instructions and counseling regarding his condition or for health maintenance advice. Please see specific information pulled into the AVS if appropriate.

## 2025-02-04 ENCOUNTER — HOSPITAL ENCOUNTER (OUTPATIENT)
Dept: MRI IMAGING | Facility: HOSPITAL | Age: 80
Discharge: HOME | End: 2025-02-04
Admitting: SURGERY
Payer: MEDICARE

## 2025-02-04 VITALS — HEART RATE: 80 BPM | DIASTOLIC BLOOD PRESSURE: 65 MMHG | OXYGEN SATURATION: 96 % | SYSTOLIC BLOOD PRESSURE: 93 MMHG

## 2025-02-04 DIAGNOSIS — M79.89 MASS OF SOFT TISSUE OF KNEE: ICD-10-CM

## 2025-02-04 LAB
ALBUMIN SERPL-MCNC: 4.3 G/DL (ref 3.8–4.8)
ALP SERPL-CCNC: 92 IU/L (ref 44–121)
ALT SERPL-CCNC: 23 IU/L (ref 0–44)
AST SERPL-CCNC: 31 IU/L (ref 0–40)
BASOPHILS # BLD AUTO: 0.1 X10E3/UL (ref 0–0.2)
BASOPHILS NFR BLD AUTO: 1 %
BILIRUB SERPL-MCNC: 0.9 MG/DL (ref 0–1.2)
BUN SERPL-MCNC: 19 MG/DL (ref 8–27)
BUN/CREAT SERPL: 19 (ref 10–24)
CALCIUM SERPL-MCNC: 9.7 MG/DL (ref 8.6–10.2)
CHLORIDE SERPL-SCNC: 107 MMOL/L (ref 96–106)
CO2 SERPL-SCNC: 22 MMOL/L (ref 20–29)
CREAT SERPL-MCNC: 1 MG/DL (ref 0.76–1.27)
CRP SERPL-MCNC: 2 MG/L (ref 0–10)
EGFRCR SERPLBLD CKD-EPI 2021: 77 ML/MIN/1.73
EOSINOPHIL # BLD AUTO: 0.2 X10E3/UL (ref 0–0.4)
EOSINOPHIL NFR BLD AUTO: 2 %
ERYTHROCYTE [DISTWIDTH] IN BLOOD BY AUTOMATED COUNT: 13.2 % (ref 11.6–15.4)
GLOBULIN SER CALC-MCNC: 1.7 G/DL (ref 1.5–4.5)
GLUCOSE SERPL-MCNC: 101 MG/DL (ref 70–99)
HCT VFR BLD AUTO: 41.8 % (ref 37.5–51)
HGB BLD-MCNC: 13.7 G/DL (ref 13–17.7)
IMM GRANULOCYTES # BLD AUTO: 0 X10E3/UL (ref 0–0.1)
IMM GRANULOCYTES NFR BLD AUTO: 0 %
LYMPHOCYTES # BLD AUTO: 1.6 X10E3/UL (ref 0.7–3.1)
LYMPHOCYTES NFR BLD AUTO: 19 %
MCH RBC QN AUTO: 34.3 PG (ref 26.6–33)
MCHC RBC AUTO-ENTMCNC: 32.8 G/DL (ref 31.5–35.7)
MCV RBC AUTO: 105 FL (ref 79–97)
MONOCYTES # BLD AUTO: 0.7 X10E3/UL (ref 0.1–0.9)
MONOCYTES NFR BLD AUTO: 8 %
NEUTROPHILS # BLD AUTO: 6 X10E3/UL (ref 1.4–7)
NEUTROPHILS NFR BLD AUTO: 70 %
PLATELET # BLD AUTO: 210 X10E3/UL (ref 150–450)
POTASSIUM SERPL-SCNC: 4.5 MMOL/L (ref 3.5–5.2)
PROT SERPL-MCNC: 6 G/DL (ref 6–8.5)
RBC # BLD AUTO: 4 X10E6/UL (ref 4.14–5.8)
SODIUM SERPL-SCNC: 142 MMOL/L (ref 134–144)
TSH SERPL DL<=0.005 MIU/L-ACNC: 0.42 UIU/ML (ref 0.45–4.5)
VIT B12 SERPL-MCNC: 581 PG/ML (ref 232–1245)
WBC # BLD AUTO: 8.5 X10E3/UL (ref 3.4–10.8)

## 2025-02-04 PROCEDURE — A9577 INJ MULTIHANCE: HCPCS | Performed by: SURGERY

## 2025-02-04 PROCEDURE — 73723 MRI JOINT LWR EXTR W/O&W/DYE: CPT

## 2025-02-04 PROCEDURE — 25510000002 GADOBENATE DIMEGLUMINE 529 MG/ML SOLUTION: Performed by: SURGERY

## 2025-02-04 RX ADMIN — GADOBENATE DIMEGLUMINE 15 ML: 529 INJECTION, SOLUTION INTRAVENOUS at 12:10

## 2025-02-04 NOTE — NURSING NOTE
"I called [facility] and spoke with Radha Negron RN. She checked records from yesterday's wound care appointment and she stated they \"did not use Silver\" in the dressing.      "

## 2025-02-05 NOTE — PROGRESS NOTES
The last time he was in the office it was almost healed.  It looks like he could use a knee replacement don't you agree?

## 2025-02-06 ENCOUNTER — TELEPHONE (OUTPATIENT)
Dept: SURGERY | Facility: CLINIC | Age: 80
End: 2025-02-06
Payer: MEDICARE

## 2025-02-06 DIAGNOSIS — S83.207A ACUTE MENISCAL TEAR OF KNEE, LEFT, INITIAL ENCOUNTER: Primary | ICD-10-CM

## 2025-02-06 NOTE — TELEPHONE ENCOUNTER
Pt's wife called and reports pt is very upset and thinks he is going to die because he was ref to ortho Dr. Lynn.  Long discussion with wife about the reason for the referral and she req that I speak with pt.  Mr. Miranda came to the phone and we discussed that Dr. Sandoval ref him to Dr. Lynn to review the results of recent MRI and arthritis in his knee.  Pt states he is concerned he might die. When asked why, he states he is unable to eat and is losing weight.  Pt advised to contact PCP and sched OV to discuss those concerns.  Pt is agreeable.

## 2025-02-07 ENCOUNTER — APPOINTMENT (OUTPATIENT)
Dept: WOUND CARE | Facility: HOSPITAL | Age: 80
End: 2025-02-07
Payer: MEDICARE

## 2025-02-07 LAB
CORTIS SERPL-MCNC: 5.1 UG/DL (ref 6.2–19.4)
WRITTEN AUTHORIZATION: NORMAL

## 2025-02-07 PROCEDURE — 97607 NEG PRS WND THR NDME<=50SQCM: CPT

## 2025-02-10 ENCOUNTER — OFFICE VISIT (OUTPATIENT)
Dept: SURGERY | Facility: CLINIC | Age: 80
End: 2025-02-10
Payer: MEDICARE

## 2025-02-10 DIAGNOSIS — M17.12 OSTEOARTHRITIS OF LEFT KNEE, UNSPECIFIED OSTEOARTHRITIS TYPE: ICD-10-CM

## 2025-02-10 DIAGNOSIS — S81.802D WOUND OF LEFT LOWER EXTREMITY, SUBSEQUENT ENCOUNTER: Primary | ICD-10-CM

## 2025-02-10 NOTE — LETTER
February 10, 2025     Keegan Segura MD  [address]  [facility] [address]    Patient: Moises Miranda   YOB: 1945   Date of Visit: 2/10/2025     Dear Keegan Segura MD:       Thank you for referring Moises Miranda to me for evaluation. Below are the relevant portions of my assessment and plan of care.    If you have questions, please do not hesitate to call me. I look forward to following Moises along with you.         Sincerely,        Anali Sandoval DO        CC: No Recipients    Anali Sandoval DO  02/10/25 1037  Sign when Signing Visit  Moises Miranda 79 y.o. male presents for FU LLE. Pt thinks it may be a little better.       HPI   Above noted agree.  Moises is here for wound evaluation to discuss his MRI.  His left knee MRI showed    1. Patchy soft tissue edema, greatest at the partially imaged lateral  proximal lower leg, with an incompletely imaged thin multilobulated  cystic lesion in the fascial plane between the subcutaneous fat and the  underlying musculature at the lateral proximal lower leg, probable  benign ganglion. An open wound is not definitively seen in the  field-of-view.  2. Tricompartmental DJD, most severe in the medial compartment, with  chronic appearing nondisplaced subchondral insufficiency type fracture  deformities at the weightbearing portions of the medial femoral condyle  and medial tibial plateau and medial weightbearing portion of the  lateral femoral condyle.  3. Near circumferential severe complex tear of the medial meniscus with  low signal vacuum phenomenon or a large 2 cm bucket handle type meniscal  flap flipped into the anterior medial knee joint space and small  meniscal flap flipped into the anteromedial femoral gutter.  4. Likely degenerative fraying of the lateral meniscus body inner free  margin.  5. Likely chronic complete tear of the ACL.  6. Trace knee joint fluid with few synovial thickening and/or  intra-articular debris and small bodies or nodular  synovial thickening  in the anterior and medial knee joint space.    He has been going to wound care for his wound.  They have a carlos enrique dressing on it at this time.  It is still having some drainage but appears to be filling in.      Review of Systems        Past Medical History:   Diagnosis Date   • Allergic    • Anxiety    • Benign prostatic hyperplasia    • Bulbous urethral 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   • Cancer     melanoma   • Cataract    • CHF (congestive heart failure)    • Cholelithiasis    • Colon polyp    • Defibrillator     has had for 4-5 yrs. Dr. Shanks cardiologist   • Depression    • Diverticulosis    • Elevated cholesterol    • Erectile dysfunction    • GERD (gastroesophageal reflux disease)    • HL (hearing loss)    • Hyperlipidemia    • Hypertension    • Kidney stone    • Low back pain    • Prostate cancer     2022   • Rheumatoid arthritis    • Visual impairment            Past Surgical History:   Procedure Laterality Date   • CARDIAC DEFIBRILLATOR PLACEMENT     • COLONOSCOPY  04 25 5 polops   • COLONOSCOPY W/ POLYPECTOMY N/A 05/10/2024    Procedure: COLONOSCOPY WITH POLYPECTOMY;  Surgeon: Richard Dillon MD;  Location: [facility] OR;  Service: Gastroenterology;  Laterality: N/A;  diverticulosis; sigmoid polyp x2 (cold snare); ascending polyp x1 (forceps); transverse polyp x1 (cold snare)   • CYSTOSCOPY URETHROTOMY VISUAL INTERNAL N/A 09/28/2022    Procedure: CYSTOSCOPY WITH INTERNAL VISUAL URETHROTOMY;  Surgeon: Jose E Miranda MD;  Location: [facility] OR;  Service: Urology;  Laterality: N/A;   • EXCISION MASS LEG Left 11/26/2024    Procedure: WOUND EXPLORATION Left LOWER EXTREMITy;  Surgeon: Anali Sandoval DO;  Location: [facility] OR;  Service: General;  Laterality: Left;   • EYE SURGERY     • PROSTATE SURGERY     • SACROILIAC JOINT INJECTION Left 10/15/2024   • SKIN CANCER EXCISION  03/15/2013           Physical Exam  Vitals and nursing note reviewed.   Constitutional:       Appearance:  Normal appearance.   Skin:     Comments: The wound appears to be filling in but there is still some moderate exudate noted.   Neurological:      General: No focal deficit present.      Mental Status: He is alert and oriented to person, place, and time.   Psychiatric:         Mood and Affect: Mood normal.         Behavior: Behavior normal.             There were no vitals taken for this visit.        Diagnoses and all orders for this visit:    1. Wound of left lower extremity, subsequent encounter (Primary)    2. Osteoarthritis of left knee, unspecified osteoarthritis type    We have already put in a referral for Moises to see Dr. Lynn.  I discussed with him that all recommendations regarding any replaced would have to come from Dr. Lynn.  We also discussed continuing wound care.  He may return to see me anytime as needed.    Thank you for allowing me to participate in the care of this interesting patient.

## 2025-02-10 NOTE — PROGRESS NOTES
Moises Miranda 79 y.o. male presents for FU LLE. Pt thinks it may be a little better.       HPI   Above noted agree.  Moises is here for wound evaluation to discuss his MRI.  His left knee MRI showed    1. Patchy soft tissue edema, greatest at the partially imaged lateral  proximal lower leg, with an incompletely imaged thin multilobulated  cystic lesion in the fascial plane between the subcutaneous fat and the  underlying musculature at the lateral proximal lower leg, probable  benign ganglion. An open wound is not definitively seen in the  field-of-view.  2. Tricompartmental DJD, most severe in the medial compartment, with  chronic appearing nondisplaced subchondral insufficiency type fracture  deformities at the weightbearing portions of the medial femoral condyle  and medial tibial plateau and medial weightbearing portion of the  lateral femoral condyle.  3. Near circumferential severe complex tear of the medial meniscus with  low signal vacuum phenomenon or a large 2 cm bucket handle type meniscal  flap flipped into the anterior medial knee joint space and small  meniscal flap flipped into the anteromedial femoral gutter.  4. Likely degenerative fraying of the lateral meniscus body inner free  margin.  5. Likely chronic complete tear of the ACL.  6. Trace knee joint fluid with few synovial thickening and/or  intra-articular debris and small bodies or nodular synovial thickening  in the anterior and medial knee joint space.    He has been going to wound care for his wound.  They have a carlos enrique dressing on it at this time.  It is still having some drainage but appears to be filling in.      Review of Systems        Past Medical History:   Diagnosis Date    Allergic     Anxiety     Benign prostatic hyperplasia     Bulbous urethral 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    Cancer     melanoma    Cataract     CHF (congestive heart failure)     Cholelithiasis     Colon polyp     Defibrillator     has had for 4-5 yrs. Dr. Shanks  cardiologist    Depression     Diverticulosis     Elevated cholesterol     Erectile dysfunction     GERD (gastroesophageal reflux disease)     HL (hearing loss)     Hyperlipidemia     Hypertension     Kidney stone     Low back pain     Prostate cancer     2022    Rheumatoid arthritis     Visual impairment            Past Surgical History:   Procedure Laterality Date    CARDIAC DEFIBRILLATOR PLACEMENT      COLONOSCOPY  04 25 5 polops    COLONOSCOPY W/ POLYPECTOMY N/A 05/10/2024    Procedure: COLONOSCOPY WITH POLYPECTOMY;  Surgeon: Richard Dillon MD;  Location: [facility] OR;  Service: Gastroenterology;  Laterality: N/A;  diverticulosis; sigmoid polyp x2 (cold snare); ascending polyp x1 (forceps); transverse polyp x1 (cold snare)    CYSTOSCOPY URETHROTOMY VISUAL INTERNAL N/A 09/28/2022    Procedure: CYSTOSCOPY WITH INTERNAL VISUAL URETHROTOMY;  Surgeon: Jose E Miranda MD;  Location: [facility] OR;  Service: Urology;  Laterality: N/A;    EXCISION MASS LEG Left 11/26/2024    Procedure: WOUND EXPLORATION Left LOWER EXTREMITy;  Surgeon: Anali Sandoval DO;  Location: [facility] OR;  Service: General;  Laterality: Left;    EYE SURGERY      PROSTATE SURGERY      SACROILIAC JOINT INJECTION Left 10/15/2024    SKIN CANCER EXCISION  03/15/2013           Physical Exam  Vitals and nursing note reviewed.   Constitutional:       Appearance: Normal appearance.   Skin:     Comments: The wound appears to be filling in but there is still some moderate exudate noted.   Neurological:      General: No focal deficit present.      Mental Status: He is alert and oriented to person, place, and time.   Psychiatric:         Mood and Affect: Mood normal.         Behavior: Behavior normal.             There were no vitals taken for this visit.        Diagnoses and all orders for this visit:    1. Wound of left lower extremity, subsequent encounter (Primary)    2. Osteoarthritis of left knee, unspecified osteoarthritis type    We  have already put in a referral for Moises to see Dr. Lynn.  I discussed with him that all recommendations regarding any replaced would have to come from Dr. Lynn.  We also discussed continuing wound care.  He may return to see me anytime as needed.    Thank you for allowing me to participate in the care of this interesting patient.

## 2025-02-11 ENCOUNTER — APPOINTMENT (OUTPATIENT)
Dept: WOUND CARE | Facility: HOSPITAL | Age: 80
End: 2025-02-11
Payer: MEDICARE

## 2025-02-11 PROCEDURE — 97607 NEG PRS WND THR NDME<=50SQCM: CPT

## 2025-02-12 ENCOUNTER — TELEPHONE (OUTPATIENT)
Dept: ORTHOPEDIC SURGERY | Facility: CLINIC | Age: 80
End: 2025-02-12
Payer: MEDICARE

## 2025-02-14 ENCOUNTER — OFFICE VISIT (OUTPATIENT)
Dept: INTERNAL MEDICINE | Facility: CLINIC | Age: 80
End: 2025-02-14
Payer: MEDICARE

## 2025-02-14 ENCOUNTER — APPOINTMENT (OUTPATIENT)
Dept: WOUND CARE | Facility: HOSPITAL | Age: 80
End: 2025-02-14
Payer: MEDICARE

## 2025-02-14 VITALS
DIASTOLIC BLOOD PRESSURE: 60 MMHG | SYSTOLIC BLOOD PRESSURE: 100 MMHG | WEIGHT: 147 LBS | HEART RATE: 61 BPM | HEIGHT: 65 IN | OXYGEN SATURATION: 96 % | BODY MASS INDEX: 24.49 KG/M2

## 2025-02-14 DIAGNOSIS — F32.89 OTHER DEPRESSION: Primary | ICD-10-CM

## 2025-02-14 DIAGNOSIS — K21.9 GASTROESOPHAGEAL REFLUX DISEASE WITHOUT ESOPHAGITIS: ICD-10-CM

## 2025-02-14 PROCEDURE — 99214 OFFICE O/P EST MOD 30 MIN: CPT | Performed by: INTERNAL MEDICINE

## 2025-02-14 PROCEDURE — 97607 NEG PRS WND THR NDME<=50SQCM: CPT

## 2025-02-14 PROCEDURE — 1125F AMNT PAIN NOTED PAIN PRSNT: CPT | Performed by: INTERNAL MEDICINE

## 2025-02-14 PROCEDURE — 1159F MED LIST DOCD IN RCRD: CPT | Performed by: INTERNAL MEDICINE

## 2025-02-14 PROCEDURE — 1160F RVW MEDS BY RX/DR IN RCRD: CPT | Performed by: INTERNAL MEDICINE

## 2025-02-14 RX ORDER — MIRTAZAPINE 7.5 MG/1
7.5 TABLET, FILM COATED ORAL NIGHTLY
Qty: 30 TABLET | Refills: 2 | Status: SHIPPED | OUTPATIENT
Start: 2025-02-14

## 2025-02-14 NOTE — PROGRESS NOTES
Chief Complaint  Establish Care (Chronic Wound left leg)    Subjective        Moises Miranda presents to [facility]  History of Present Illness  Moises Miranda is a 80 y/o M with PMH of HFrEF (34% on 06/2021), ischemic cardiomyopathy s/p CRT-D, CAD, NSVT, h/o prostate cancer s/p IMRT, RA, HLD, AAA, GERD, osteoarthritis, h/o melanoma, and h/o renal stones who presents to establish care. Patient reports that he is doing fairly well overall. Does note that he had L leg mass removed a couple months ago and it has been draining ever since. Patient reports that he saw the general surgeon who performed the excision and states that he went to see a knee specialist to see if his knee needed to be replaced and they said no. He states that since the surgery, he has been following with wound care and been using a wound vac and given the slow recovery, he has been feeling more sad and depressed. States that his appetite has also been affected and he is not able to do the things that he enjoys doing given his wound. Reports that he has never been on medications for mood before and states that he would be interested in starting a medication.     Patient was seen on Dr. Gomes on 2/3/25 for low blood pressures and states that since he has cut down his Entresto to once daily, he has been feeling better. Denies any symptoms of dizziness or lightheadedness. Patient's sister reports that BP has been logged and ranges from 110s to 120s since the medication adjustment.    Review of Systems   Constitutional:  Positive for appetite change (decreased). Negative for fever.   HENT:  Negative for ear pain, rhinorrhea and sore throat.    Eyes:  Negative for pain and visual disturbance.   Respiratory:  Negative for cough, shortness of breath and wheezing.    Cardiovascular:  Negative for chest pain and palpitations.   Gastrointestinal:  Negative for abdominal pain, constipation, diarrhea, nausea and  vomiting.   Genitourinary:  Negative for dysuria and hematuria.   Musculoskeletal:  Positive for arthralgias (hips) and back pain. Negative for myalgias.   Skin:  Positive for wound (at LLE).   Neurological:  Negative for dizziness, light-headedness and headaches.   Psychiatric/Behavioral:  Positive for dysphoric mood. Negative for suicidal ideas.    All other systems reviewed and are negative.          Current Outpatient Medications:     alendronate (FOSAMAX) 70 MG tablet, Take 1 tablet by mouth Every 7 (Seven) Days., Disp: , Rfl:     Ascorbic Acid (VITAMIN C PO), Take  by mouth., Disp: , Rfl:     aspirin 81 MG EC tablet, 1 tablet Daily., Disp: , Rfl:     atorvastatin (LIPITOR) 20 MG tablet, Take 1 tablet by mouth., Disp: , Rfl:     carvedilol (COREG) 12.5 MG tablet, TAKE 1/2 TABLET TWICE DAILY WITH MEALS, Disp: , Rfl:     folic acid (FOLVITE) 1 MG tablet, Take 1 tablet by mouth 3 (Three) Times a Day., Disp: , Rfl:     furosemide (LASIX) 20 MG tablet, , Disp: , Rfl:     loratadine (CLARITIN) 10 MG tablet, Take 1 tablet by mouth Daily., Disp: 30 tablet, Rfl: 0    methotrexate 2.5 MG tablet, , Disp: , Rfl:     omeprazole (priLOSEC) 20 MG capsule, Take 1 capsule by mouth Daily., Disp: 90 capsule, Rfl: 3    Pediatric Multivitamins-Iron (multivitamin chewable) chewable tablet, 1 tablet Daily., Disp: , Rfl:     sacubitril-valsartan (Entresto) 24-26 MG tablet, Take 1 tablet by mouth 2 (Two) Times a Day., Disp: 60 tablet, Rfl: 3    spironolactone (ALDACTONE) 25 MG tablet, Take 1 tablet by mouth Daily., Disp: , Rfl:     tamsulosin (FLOMAX) 0.4 MG capsule 24 hr capsule, Take 1 capsule by mouth Daily., Disp: , Rfl:     mirtazapine (REMERON) 7.5 MG tablet, Take 1 tablet by mouth Every Night., Disp: 30 tablet, Rfl: 2  Past Medical History:   Diagnosis Date    Allergic     Anxiety     Benign prostatic hyperplasia     Bulbous urethral 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    Cancer     melanoma    Cataract     CHF (congestive heart failure)      Cholelithiasis     Colon polyp     Defibrillator     has had for 4-5 yrs. Dr. Shanks cardiologist    Depression     Diverticulosis     Elevated cholesterol     Erectile dysfunction     GERD (gastroesophageal reflux disease)     HL (hearing loss)     Hyperlipidemia     Hypertension     Kidney stone     Low back pain     Prostate cancer         Rheumatoid arthritis     Visual impairment      Past Surgical History:   Procedure Laterality Date    CARDIAC DEFIBRILLATOR PLACEMENT      COLONOSCOPY   polops    COLONOSCOPY W/ POLYPECTOMY N/A 05/10/2024    Procedure: COLONOSCOPY WITH POLYPECTOMY;  Surgeon: Richard Dillon MD;  Location: [facility];  Service: Gastroenterology;  Laterality: N/A;  diverticulosis; sigmoid polyp x2 (cold snare); ascending polyp x1 (forceps); transverse polyp x1 (cold snare)    CYSTOSCOPY URETHROTOMY VISUAL INTERNAL N/A 2022    Procedure: CYSTOSCOPY WITH INTERNAL VISUAL URETHROTOMY;  Surgeon: Jose E Miranda MD;  Location: [facility];  Service: Urology;  Laterality: N/A;    EXCISION MASS LEG Left 2024    Procedure: WOUND EXPLORATION Left LOWER EXTREMITy;  Surgeon: Anali Sandoval DO;  Location: [facility];  Service: General;  Laterality: Left;    EYE SURGERY      PROSTATE SURGERY      SACROILIAC JOINT INJECTION Left 10/15/2024    SKIN CANCER EXCISION  03/15/2013     Family History   Problem Relation Age of Onset    Cancer Brother     Hearing loss Brother     Alcohol abuse Maternal Grandmother     Hypertension Neg Hx     Malig Hyperthermia Neg Hx      Social History     Socioeconomic History    Marital status:    Tobacco Use    Smoking status: Former     Current packs/day: 0.00     Average packs/day: 1 pack/day for 15.0 years (15.0 ttl pk-yrs)     Types: Cigarettes     Quit date: 10/19/1990     Years since quittin.3    Smokeless tobacco: Never    Tobacco comments:     quit    Vaping Use    Vaping status: Never Used   Substance and Sexual  "Activity    Alcohol use: Not Currently     Alcohol/week: 3.0 standard drinks of alcohol     Types: 3 Cans of beer per week     Comment: rarely    Drug use: No    Sexual activity: Not Currently     Partners: Female       HM reviewed and updated    Objective   Vital Signs:  /60 (BP Location: Left arm, Patient Position: Sitting, Cuff Size: Adult)   Pulse 61   Ht 165.1 cm (65\")   Wt 66.7 kg (147 lb)   SpO2 96%   BMI 24.46 kg/m²   Estimated body mass index is 24.46 kg/m² as calculated from the following:    Height as of this encounter: 165.1 cm (65\").    Weight as of this encounter: 66.7 kg (147 lb).    BMI is within normal parameters. No other follow-up for BMI required.      Physical Exam  Vitals reviewed.   Constitutional:       General: He is not in acute distress.     Appearance: Normal appearance. He is normal weight. He is not ill-appearing.   HENT:      Head: Normocephalic and atraumatic.      Right Ear: External ear normal.      Left Ear: External ear normal.      Nose: Nose normal.   Eyes:      General: No scleral icterus.     Extraocular Movements: Extraocular movements intact.      Conjunctiva/sclera: Conjunctivae normal.   Cardiovascular:      Rate and Rhythm: Normal rate and regular rhythm.      Pulses: Normal pulses.      Heart sounds: Normal heart sounds.   Pulmonary:      Effort: Pulmonary effort is normal. No respiratory distress.      Breath sounds: Normal breath sounds. No wheezing.   Abdominal:      General: Bowel sounds are normal. There is no distension.      Palpations: Abdomen is soft.      Tenderness: There is no abdominal tenderness.   Musculoskeletal:         General: Normal range of motion.      Cervical back: Normal range of motion.      Right lower leg: No edema.      Left lower leg: No edema.      Comments: Left lower extremity wrapped in dressings   Skin:     General: Skin is warm and dry.      Comments: Age-related skin changes present   Neurological:      General: No focal " deficit present.      Mental Status: He is alert and oriented to person, place, and time. Mental status is at baseline.   Psychiatric:         Mood and Affect: Mood normal.         Behavior: Behavior normal.         Thought Content: Thought content normal.         Judgment: Judgment normal.        Result Review :  The following data was reviewed by: Keegan Segura MD on 02/14/2025:  Common labs          11/7/2024    12:06 11/22/2024    13:54 2/3/2025    14:09   Common Labs   Glucose  112  101    BUN  20  19    Creatinine  1.05  1.00    Sodium  143  142    Potassium  3.8  4.5    Chloride  108  107    Calcium  9.7  9.7    Albumin   4.3    Total Bilirubin   0.9    Alkaline Phosphatase   92    AST (SGOT)   31    ALT (SGPT)   23    WBC 9.35   8.5    Hemoglobin 14.5   13.7    Hematocrit 43.9   41.8    Platelets 210   210      Data reviewed : prior office notes reviewed           Assessment and Plan   Diagnoses and all orders for this visit:    1. Other depression (Primary)  -     mirtazapine (REMERON) 7.5 MG tablet; Take 1 tablet by mouth Every Night.  Dispense: 30 tablet; Refill: 2    2. Gastroesophageal reflux disease without esophagitis  -     omeprazole (priLOSEC) 20 MG capsule; Take 1 capsule by mouth Daily.  Dispense: 90 capsule; Refill: 3      Moises Miranda is a 78 y/o M with PMH of HFrEF (34% on 06/2021), ischemic cardiomyopathy s/p CRT-D, CAD, NSVT, h/o prostate cancer s/p IMRT, RA, HLD, AAA, GERD, osteoarthritis, h/o melanoma, and h/o renal stones who presents to establish care and with complaints of depressed mood.     #Depressed mood  - Patient reports having depressed mood and decreased appetite since having his leg wound  - Patient has never tried anti-depressants or therapy before  Plan:  - Start mirtazapine 7.5mg QHS to help with mood and appetite    #Left lower leg  - S/p wound exploration with removal of seroma capsule of LLE on 11/26/24 with general surgery  - Patient reports wound is slowly healing and  continuing to drain  Plan:  - Continue to follow with wound care    #Ischemic cardiomyopathy s/p CRT-D  #HFrEF  - Following with [facility]  - 34% on 06/2021  Plan:  - Continue lasix 20mg daily, carvedilol 12.5mg BID, Entresto 24-26mg daily, and spironolactone 12.5mg daily    #Nonobstructive CAD  - Following with [facility] cardiology  Plan:  - Continue ASA 81mg daily, and carvedilol 12.5mg BID    #HLD  - Continue atorvastatin 20mg daily    #[facility]  - [facility] with [facility] for pain in lower back pain  Plan:  - Continue methotrexate 25mg Qweekly  - To resume prednisone per rheumatology  - Continue steroid injections per Pain Management    #GERD  - Continue omeprazole 20mg daily    #H/o prostate cancer  - Following with [facility]  - S/p IMRT completed in 2015  Plan:  - Continue tamsulosin 0.4mg QHS    #Health Maintenance  - Colonoscopy 05/2024 with 4 polyps, recommend f/u c-scope in 3 years       I spent 15 minutes caring for Moises on this date of service. This time includes time spent by me in the following activities:preparing for the visit, reviewing tests, obtaining and/or reviewing a separately obtained history, performing a medically appropriate examination and/or evaluation , counseling and educating the patient/family/caregiver, ordering medications, tests, or procedures, and documenting information in the medical record  Follow Up   F/u 2-3 months    Patient seen and examined personally by me following resident evaluation. Agree with assessment and plan as above unless documented below.    Keegan Segura MD   [facility]

## 2025-02-18 ENCOUNTER — APPOINTMENT (OUTPATIENT)
Dept: WOUND CARE | Facility: HOSPITAL | Age: 80
End: 2025-02-18
Payer: MEDICARE

## 2025-02-18 PROCEDURE — 97607 NEG PRS WND THR NDME<=50SQCM: CPT

## 2025-02-21 ENCOUNTER — APPOINTMENT (OUTPATIENT)
Dept: WOUND CARE | Facility: HOSPITAL | Age: 80
End: 2025-02-21
Payer: MEDICARE

## 2025-02-21 PROCEDURE — 97607 NEG PRS WND THR NDME<=50SQCM: CPT

## 2025-02-25 ENCOUNTER — APPOINTMENT (OUTPATIENT)
Dept: WOUND CARE | Facility: HOSPITAL | Age: 80
End: 2025-02-25
Payer: MEDICARE

## 2025-02-25 PROCEDURE — 97607 NEG PRS WND THR NDME<=50SQCM: CPT

## 2025-02-27 ENCOUNTER — OFFICE VISIT (OUTPATIENT)
Dept: INTERNAL MEDICINE | Facility: CLINIC | Age: 80
End: 2025-02-27
Payer: MEDICARE

## 2025-02-27 VITALS
RESPIRATION RATE: 16 BRPM | WEIGHT: 148.6 LBS | OXYGEN SATURATION: 95 % | SYSTOLIC BLOOD PRESSURE: 124 MMHG | BODY MASS INDEX: 24.76 KG/M2 | DIASTOLIC BLOOD PRESSURE: 80 MMHG | HEIGHT: 65 IN | HEART RATE: 80 BPM

## 2025-02-27 DIAGNOSIS — D36.7 DERMOID CYST OF LEFT LOWER EXTREMITY: Primary | ICD-10-CM

## 2025-02-27 PROCEDURE — 99214 OFFICE O/P EST MOD 30 MIN: CPT | Performed by: INTERNAL MEDICINE

## 2025-02-27 PROCEDURE — 1125F AMNT PAIN NOTED PAIN PRSNT: CPT | Performed by: INTERNAL MEDICINE

## 2025-02-28 ENCOUNTER — APPOINTMENT (OUTPATIENT)
Dept: WOUND CARE | Facility: HOSPITAL | Age: 80
End: 2025-02-28
Payer: MEDICARE

## 2025-02-28 PROCEDURE — 97607 NEG PRS WND THR NDME<=50SQCM: CPT

## 2025-02-28 NOTE — PROGRESS NOTES
"Chief Complaint  Wound Infection (Wound not healing on  left leg)    Subjective        Moises Miranda presents to [facility]  Wound Infection  Here for follow up   Still dealing with quite a bit of drainage from left lateral wound   It is healing but not closing  I spoke with his wound care team and they are going to look into plastics taking a look as well  They are curious about getting a second opinion  We reviewed the MRI and my question is in regards to this :  Patchy soft tissue edema, greatest at the partially imaged lateral  proximal lower leg, with an incompletely imaged thin multilobulated  cystic lesion in the fascial plane between the subcutaneous fat and the  underlying musculature at the lateral proximal lower leg, probable  benign ganglion. An open wound is not definitively seen in the  field-of-view.    Wondering if that may be the source of his ongoing drainage    Objective   Vital Signs:  /80   Pulse 80   Resp 16   Ht 165.1 cm (65\")   Wt 67.4 kg (148 lb 9.6 oz)   SpO2 95%   BMI 24.73 kg/m²   Estimated body mass index is 24.73 kg/m² as calculated from the following:    Height as of this encounter: 165.1 cm (65\").    Weight as of this encounter: 67.4 kg (148 lb 9.6 oz).    BMI is within normal parameters. No other follow-up for BMI required.      Physical Exam  Vitals and nursing note reviewed.   Constitutional:       General: He is not in acute distress.     Appearance: Normal appearance. He is not toxic-appearing.   HENT:      Head: Normocephalic and atraumatic.      Right Ear: External ear normal.      Left Ear: External ear normal.      Nose: Nose normal.   Eyes:      General: No scleral icterus.        Right eye: No discharge.         Left eye: No discharge.      Extraocular Movements: Extraocular movements intact.      Conjunctiva/sclera: Conjunctivae normal.      Pupils: Pupils are equal, round, and reactive to light.   Cardiovascular: "      Rate and Rhythm: Normal rate and regular rhythm.      Pulses: Normal pulses.      Heart sounds: Normal heart sounds. No murmur heard.  Pulmonary:      Effort: Pulmonary effort is normal.   Musculoskeletal:         General: Normal range of motion.   Skin:     General: Skin is warm.      Capillary Refill: Capillary refill takes less than 2 seconds.      Findings: Lesion present.   Neurological:      General: No focal deficit present.      Mental Status: He is alert and oriented to person, place, and time. Mental status is at baseline.      Cranial Nerves: No cranial nerve deficit.      Gait: Gait normal.   Psychiatric:         Mood and Affect: Mood normal.         Behavior: Behavior normal.         Thought Content: Thought content normal.         Judgment: Judgment normal.        Result Review :  The following data was reviewed by: Keegan Segura MD on 02/27/2025:  Common labs          11/7/2024    12:06 11/22/2024    13:54 2/3/2025    14:09   Common Labs   Glucose  112  101    BUN  20  19    Creatinine  1.05  1.00    Sodium  143  142    Potassium  3.8  4.5    Chloride  108  107    Calcium  9.7  9.7    Albumin   4.3    Total Bilirubin   0.9    Alkaline Phosphatase   92    AST (SGOT)   31    ALT (SGPT)   23    WBC 9.35   8.5    Hemoglobin 14.5   13.7    Hematocrit 43.9   41.8    Platelets 210   210      Data reviewed : prior office notes reviewed           Assessment and Plan   Diagnoses and all orders for this visit:    1. Dermoid cyst of left lower extremity (Primary)  -     Ambulatory Referral to General Surgery    We reviewed his MRI L knee which did show a multilobulated cystic lesion in fascial plane between subcutaneous fat and underlying musculature - prob benign ganglion.   He is still having drainage, quite a bit actually, the wound is healing some but it is not closing up, suspect related to ongoing drainage  They would like to see Dr. Dunaway at [place] and we discussed having wound team potentially  involve plastics if available since this has been ongoing for last year almost  Does not appear to be vascular issue due to location and healing overall  Would consider full MRI LLE as well       I spent 15 minutes caring for Moises on this date of service. This time includes time spent by me in the following activities:preparing for the visit, reviewing tests, obtaining and/or reviewing a separately obtained history, performing a medically appropriate examination and/or evaluation , counseling and educating the patient/family/caregiver, ordering medications, tests, or procedures, and documenting information in the medical record  Follow Up   No follow-ups on file.  Patient was given instructions and counseling regarding his condition or for health maintenance advice. Please see specific information pulled into the AVS if appropriate.     Keegan Segura MD  [facility] Primary [facility]  [address]  Phone: [phone]

## 2025-03-04 ENCOUNTER — APPOINTMENT (OUTPATIENT)
Dept: WOUND CARE | Facility: HOSPITAL | Age: 80
End: 2025-03-04
Payer: MEDICARE

## 2025-03-04 PROCEDURE — G0463 HOSPITAL OUTPT CLINIC VISIT: HCPCS

## 2025-04-14 ENCOUNTER — OFFICE VISIT (OUTPATIENT)
Dept: INTERNAL MEDICINE | Facility: CLINIC | Age: 80
End: 2025-04-14
Payer: MEDICARE

## 2025-04-14 VITALS
WEIGHT: 143.9 LBS | SYSTOLIC BLOOD PRESSURE: 104 MMHG | BODY MASS INDEX: 23.97 KG/M2 | HEART RATE: 61 BPM | DIASTOLIC BLOOD PRESSURE: 62 MMHG | OXYGEN SATURATION: 99 % | HEIGHT: 65 IN

## 2025-04-14 DIAGNOSIS — D64.9 ANEMIA, UNSPECIFIED TYPE: ICD-10-CM

## 2025-04-14 DIAGNOSIS — E78.00 PURE HYPERCHOLESTEROLEMIA: ICD-10-CM

## 2025-04-14 DIAGNOSIS — R63.4 WEIGHT LOSS: ICD-10-CM

## 2025-04-14 DIAGNOSIS — I25.10 CORONARY ARTERY DISEASE INVOLVING NATIVE CORONARY ARTERY OF NATIVE HEART WITHOUT ANGINA PECTORIS: Primary | ICD-10-CM

## 2025-04-14 DIAGNOSIS — R73.9 HYPERGLYCEMIA: ICD-10-CM

## 2025-04-14 PROCEDURE — 99214 OFFICE O/P EST MOD 30 MIN: CPT | Performed by: INTERNAL MEDICINE

## 2025-04-14 PROCEDURE — 1125F AMNT PAIN NOTED PAIN PRSNT: CPT | Performed by: INTERNAL MEDICINE

## 2025-04-14 NOTE — PROGRESS NOTES
"Chief Complaint  Depression    Subjective        Moises Miranda presents to [facility]  Depression  His past medical history is significant for depression.     Here for follow up   Did see group at [facility] and they have planned surgery to further evaluate his leg drainage  He has lost a few more lbs and appetite hasn't been good   He has not been taking the mirtazepine consistently but has for last 3 nights    Objective   Vital Signs:  /62   Pulse 61   Ht 165.1 cm (65\")   Wt 65.3 kg (143 lb 14.4 oz)   SpO2 99%   BMI 23.95 kg/m²   Estimated body mass index is 23.95 kg/m² as calculated from the following:    Height as of this encounter: 165.1 cm (65\").    Weight as of this encounter: 65.3 kg (143 lb 14.4 oz).    BMI is within normal parameters. No other follow-up for BMI required.      Physical Exam  Vitals and nursing note reviewed.   Constitutional:       General: He is not in acute distress.     Appearance: Normal appearance. He is not toxic-appearing.   HENT:      Head: Normocephalic and atraumatic.      Right Ear: External ear normal.      Left Ear: External ear normal.      Nose: Nose normal.   Eyes:      General: No scleral icterus.        Right eye: No discharge.         Left eye: No discharge.      Extraocular Movements: Extraocular movements intact.      Conjunctiva/sclera: Conjunctivae normal.      Pupils: Pupils are equal, round, and reactive to light.   Cardiovascular:      Rate and Rhythm: Normal rate and regular rhythm.      Pulses: Normal pulses.      Heart sounds: Normal heart sounds. No murmur heard.  Pulmonary:      Effort: Pulmonary effort is normal.   Musculoskeletal:         General: Normal range of motion.   Skin:     General: Skin is warm.      Capillary Refill: Capillary refill takes less than 2 seconds.   Neurological:      General: No focal deficit present.      Mental Status: He is alert and oriented to person, place, and time. Mental " status is at baseline.      Cranial Nerves: No cranial nerve deficit.      Gait: Gait normal.   Psychiatric:         Mood and Affect: Mood normal.         Behavior: Behavior normal.         Thought Content: Thought content normal.         Judgment: Judgment normal.        Result Review :  The following data was reviewed by: Keegan Segura MD on 04/14/2025:  Common labs          11/7/2024    12:06 11/22/2024    13:54 2/3/2025    14:09   Common Labs   Glucose  112  101    BUN  20  19    Creatinine  1.05  1.00    Sodium  143  142    Potassium  3.8  4.5    Chloride  108  107    Calcium  9.7  9.7    Albumin   4.3    Total Bilirubin   0.9    Alkaline Phosphatase   92    AST (SGOT)   31    ALT (SGPT)   23    WBC 9.35   8.5    Hemoglobin 14.5   13.7    Hematocrit 43.9   41.8    Platelets 210   210      Data reviewed : prior office notes reviewed           Assessment and Plan   Diagnoses and all orders for this visit:    1. Coronary artery disease involving native coronary artery of native heart without angina pectoris (Primary)  -     Comprehensive metabolic panel  -     Lipid panel    2. Pure hypercholesterolemia  -     Lipid panel    3. Anemia, unspecified type  -     CBC w AUTO Differential    4. Weight loss/depression  -     CBC w AUTO Differential  -     Comprehensive metabolic panel  -     TSH  -     T4, free  He has just started the mirtazepine 7.5mg nightly  We will continue this consistently for 2 weeks, consider dose increase if ineffective at that time     5. Hyperglycemia  -     Hemoglobin A1c    LLE nonhealing would - seeing plastics tomorrow for further eval, sounds like they were planning for dye study to further evaluate     F/u 6 weeks    Keegan Segura MD  [facility]  [address]  Phone: [phone]

## 2025-04-15 LAB
ALBUMIN SERPL-MCNC: 4.4 G/DL (ref 3.8–4.8)
ALP SERPL-CCNC: 87 IU/L (ref 44–121)
ALT SERPL-CCNC: 22 IU/L (ref 0–44)
AST SERPL-CCNC: 28 IU/L (ref 0–40)
BASOPHILS # BLD AUTO: 0.1 X10E3/UL (ref 0–0.2)
BASOPHILS NFR BLD AUTO: 1 %
BILIRUB SERPL-MCNC: 1.3 MG/DL (ref 0–1.2)
BUN SERPL-MCNC: 21 MG/DL (ref 8–27)
BUN/CREAT SERPL: 18 (ref 10–24)
CALCIUM SERPL-MCNC: 9.3 MG/DL (ref 8.6–10.2)
CHLORIDE SERPL-SCNC: 106 MMOL/L (ref 96–106)
CHOLEST SERPL-MCNC: 164 MG/DL (ref 100–199)
CO2 SERPL-SCNC: 22 MMOL/L (ref 20–29)
CREAT SERPL-MCNC: 1.16 MG/DL (ref 0.76–1.27)
EGFRCR SERPLBLD CKD-EPI 2021: 64 ML/MIN/1.73
EOSINOPHIL # BLD AUTO: 0.2 X10E3/UL (ref 0–0.4)
EOSINOPHIL NFR BLD AUTO: 2 %
ERYTHROCYTE [DISTWIDTH] IN BLOOD BY AUTOMATED COUNT: 13.9 % (ref 11.6–15.4)
GLOBULIN SER CALC-MCNC: 2.1 G/DL (ref 1.5–4.5)
GLUCOSE SERPL-MCNC: 89 MG/DL (ref 70–99)
HBA1C MFR BLD: 5.5 % (ref 4.8–5.6)
HCT VFR BLD AUTO: 40.1 % (ref 37.5–51)
HDLC SERPL-MCNC: 39 MG/DL
HGB BLD-MCNC: 13.6 G/DL (ref 13–17.7)
IMM GRANULOCYTES # BLD AUTO: 0 X10E3/UL (ref 0–0.1)
IMM GRANULOCYTES NFR BLD AUTO: 0 %
LDLC SERPL CALC-MCNC: 78 MG/DL (ref 0–99)
LYMPHOCYTES # BLD AUTO: 1.4 X10E3/UL (ref 0.7–3.1)
LYMPHOCYTES NFR BLD AUTO: 15 %
MCH RBC QN AUTO: 34.2 PG (ref 26.6–33)
MCHC RBC AUTO-ENTMCNC: 33.9 G/DL (ref 31.5–35.7)
MCV RBC AUTO: 101 FL (ref 79–97)
MONOCYTES # BLD AUTO: 0.6 X10E3/UL (ref 0.1–0.9)
MONOCYTES NFR BLD AUTO: 6 %
NEUTROPHILS # BLD AUTO: 6.7 X10E3/UL (ref 1.4–7)
NEUTROPHILS NFR BLD AUTO: 76 %
PLATELET # BLD AUTO: 187 X10E3/UL (ref 150–450)
POTASSIUM SERPL-SCNC: 4.4 MMOL/L (ref 3.5–5.2)
PROT SERPL-MCNC: 6.5 G/DL (ref 6–8.5)
RBC # BLD AUTO: 3.98 X10E6/UL (ref 4.14–5.8)
SODIUM SERPL-SCNC: 141 MMOL/L (ref 134–144)
T4 FREE SERPL-MCNC: 0.92 NG/DL (ref 0.82–1.77)
TRIGL SERPL-MCNC: 292 MG/DL (ref 0–149)
TSH SERPL DL<=0.005 MIU/L-ACNC: 2.09 UIU/ML (ref 0.45–4.5)
VLDLC SERPL CALC-MCNC: 47 MG/DL (ref 5–40)
WBC # BLD AUTO: 8.9 X10E3/UL (ref 3.4–10.8)

## 2025-04-25 ENCOUNTER — TELEPHONE (OUTPATIENT)
Dept: INTERNAL MEDICINE | Facility: CLINIC | Age: 80
End: 2025-04-25
Payer: MEDICARE

## 2025-04-25 RX ORDER — MIRTAZAPINE 7.5 MG/1
7.5 TABLET, FILM COATED ORAL NIGHTLY
Qty: 90 TABLET | Refills: 1 | Status: SHIPPED | OUTPATIENT
Start: 2025-04-25

## 2025-04-25 NOTE — TELEPHONE ENCOUNTER
Kit faxed a request for Mirtazapine 7.5 mg; however, I do not see that on his med list.    Thank you

## 2025-05-29 ENCOUNTER — OFFICE VISIT (OUTPATIENT)
Dept: INTERNAL MEDICINE | Facility: CLINIC | Age: 80
End: 2025-05-29
Payer: MEDICARE

## 2025-05-29 VITALS
WEIGHT: 142.6 LBS | SYSTOLIC BLOOD PRESSURE: 122 MMHG | RESPIRATION RATE: 16 BRPM | DIASTOLIC BLOOD PRESSURE: 72 MMHG | OXYGEN SATURATION: 99 % | BODY MASS INDEX: 23.76 KG/M2 | HEIGHT: 65 IN | HEART RATE: 66 BPM

## 2025-05-29 DIAGNOSIS — T81.89XD NON-HEALING SURGICAL WOUND, SUBSEQUENT ENCOUNTER: Primary | ICD-10-CM

## 2025-05-29 DIAGNOSIS — F51.01 PRIMARY INSOMNIA: ICD-10-CM

## 2025-05-30 NOTE — PROGRESS NOTES
"Chief Complaint  Follow-up (6 week f/u)    Subjective        Moises Miranda presents to [facility]  History of Present Illness  Here for follow up   Sleep has not improved with mirtazepine but only took a few days  Mood still down from leg wound, sounds like there was planned intervention but that was cancelled, has f/u on 6/2   Taking melatonin right now for sleep    Objective   Vital Signs:  /72   Pulse 66   Resp 16   Ht 165.1 cm (65\")   Wt 64.7 kg (142 lb 9.6 oz)   SpO2 99%   BMI 23.73 kg/m²   Estimated body mass index is 23.73 kg/m² as calculated from the following:    Height as of this encounter: 165.1 cm (65\").    Weight as of this encounter: 64.7 kg (142 lb 9.6 oz).    BMI is within normal parameters. No other follow-up for BMI required.      Physical Exam  Vitals and nursing note reviewed.   Constitutional:       General: He is not in acute distress.     Appearance: Normal appearance. He is not toxic-appearing.   HENT:      Head: Normocephalic and atraumatic.      Right Ear: External ear normal.      Left Ear: External ear normal.      Nose: Nose normal.   Eyes:      General: No scleral icterus.        Right eye: No discharge.         Left eye: No discharge.      Extraocular Movements: Extraocular movements intact.      Conjunctiva/sclera: Conjunctivae normal.      Pupils: Pupils are equal, round, and reactive to light.   Cardiovascular:      Rate and Rhythm: Normal rate and regular rhythm.      Pulses: Normal pulses.      Heart sounds: Normal heart sounds. No murmur heard.  Pulmonary:      Effort: Pulmonary effort is normal.   Musculoskeletal:         General: Normal range of motion.   Skin:     General: Skin is warm.      Capillary Refill: Capillary refill takes less than 2 seconds.      Comments: Nonhealing wound RLE   Neurological:      General: No focal deficit present.      Mental Status: He is alert and oriented to person, place, and time. " Mental status is at baseline.      Cranial Nerves: No cranial nerve deficit.      Gait: Gait normal.   Psychiatric:         Mood and Affect: Mood normal.         Behavior: Behavior normal.         Thought Content: Thought content normal.         Judgment: Judgment normal.        Result Review :  The following data was reviewed by: Keegan Segura MD on 05/29/2025:  Common labs          11/22/2024    13:54 2/3/2025    14:09 4/14/2025    14:02   Common Labs   Glucose 112  101  89    BUN 20  19  21    Creatinine 1.05  1.00  1.16    Sodium 143  142  141    Potassium 3.8  4.5  4.4    Chloride 108  107  106    Calcium 9.7  9.7  9.3    Albumin  4.3  4.4    Total Bilirubin  0.9  1.3    Alkaline Phosphatase  92  87    AST (SGOT)  31  28    ALT (SGPT)  23  22    WBC  8.5  8.9    Hemoglobin  13.7  13.6    Hematocrit  41.8  40.1    Platelets  210  187    Total Cholesterol   164    Triglycerides   292    HDL Cholesterol   39    LDL Cholesterol    78    Hemoglobin A1C   5.5      Data reviewed : prior office notes reviewed          Assessment and Plan   Diagnoses and all orders for this visit:    1. Non-healing surgical wound, subsequent encounter (Primary)  Following with wound care/gen surgery has f/u on 6/2    2. Primary insomnia  Uncontrolled can continue melatonin, would recommend he start and continue mirtazepine as previously prescribed    Keegan Segura MD  [facility]  [address] [place] [address]  Phone: [phone]

## 2025-08-29 ENCOUNTER — OFFICE VISIT (OUTPATIENT)
Dept: INTERNAL MEDICINE | Facility: CLINIC | Age: 80
End: 2025-08-29
Payer: MEDICARE

## 2025-08-29 VITALS
HEIGHT: 65 IN | HEART RATE: 74 BPM | DIASTOLIC BLOOD PRESSURE: 74 MMHG | BODY MASS INDEX: 23.89 KG/M2 | WEIGHT: 143.4 LBS | OXYGEN SATURATION: 97 % | SYSTOLIC BLOOD PRESSURE: 116 MMHG | TEMPERATURE: 97.6 F

## 2025-08-29 DIAGNOSIS — J30.1 SEASONAL ALLERGIC RHINITIS DUE TO POLLEN: ICD-10-CM

## 2025-08-29 DIAGNOSIS — M79.89 MASS OF SOFT TISSUE OF KNEE: ICD-10-CM

## 2025-08-29 DIAGNOSIS — F51.01 PRIMARY INSOMNIA: Primary | ICD-10-CM

## 2025-08-29 PROCEDURE — 1125F AMNT PAIN NOTED PAIN PRSNT: CPT | Performed by: INTERNAL MEDICINE

## 2025-08-29 PROCEDURE — 99214 OFFICE O/P EST MOD 30 MIN: CPT | Performed by: INTERNAL MEDICINE

## (undated) DEVICE — SNAR POLYP CAPTIVATOR/COLD STFF RND 10MM 240CM

## (undated) DEVICE — ANTIBACTERIAL UNDYED BRAIDED (POLYGLACTIN 910), SYNTHETIC ABSORBABLE SUTURE: Brand: COATED VICRYL

## (undated) DEVICE — PAD,ABDOMINAL,5"X9",STERILE,LF,1/PK: Brand: MEDLINE INDUSTRIES, INC.

## (undated) DEVICE — AMD ANTIMICROBIAL BANDAGE ROLL,6 PLY: Brand: KERLIX

## (undated) DEVICE — NITINOL WIRE WITH HYDROPHILIC TIP: Brand: SENSOR

## (undated) DEVICE — JACKT LAB F/R KNIT CUFF/COLR XLG BLU

## (undated) DEVICE — TRANSFER SET 3": Brand: MEDLINE INDUSTRIES, INC.

## (undated) DEVICE — CATH FOL COUNCL 2WY 20F 5CC

## (undated) DEVICE — ADHS SKIN PREMIERPRO EXOFIN TOPICAL HI/VISC .5ML

## (undated) DEVICE — LINER SURG CANSTR SXN S/RIGD 1500CC

## (undated) DEVICE — SYR LL TP 10ML STRL

## (undated) DEVICE — KT ORCA ORCAPOD DISP STRL

## (undated) DEVICE — BW-412T DISP COMBO CLEANING BRUSH: Brand: SINGLE USE COMBINATION CLEANING BRUSH

## (undated) DEVICE — SUT MNCRYL 2/0 SH 27IN Y417H

## (undated) DEVICE — ADAPT CLN BIOGUARD AIR/H2O DISP

## (undated) DEVICE — SYR LL W/SCALE/MARK 3ML STRL

## (undated) DEVICE — PATIENT RETURN ELECTRODE, SINGLE-USE, CONTACT QUALITY MONITORING, ADULT, WITH 9FT CORD, FOR PATIENTS WEIGING OVER 33LBS. (15KG): Brand: MEGADYNE

## (undated) DEVICE — PICO 7 15CM X 15CM: Brand: PICO™ 7

## (undated) DEVICE — VIAL FORMALIN CAP 10P 40ML

## (undated) DEVICE — SOL IRR H2O BTL 1000ML STRL

## (undated) DEVICE — PK CYSTO 90

## (undated) DEVICE — THE SINGLE USE ETRAP – POLYP TRAP IS USED FOR SUCTION RETRIEVAL OF ENDOSCOPICALLY REMOVED POLYPS.: Brand: ETRAP

## (undated) DEVICE — SOL IRR H2O BO 1000ML STRL

## (undated) DEVICE — GLV SURG SENSICARE W/ALOE PF LF 7 STRL

## (undated) DEVICE — KNIFE,HOOK,STERILE: Brand: N.A.

## (undated) DEVICE — BNDG,ELSTC,MATRIX,STRL,6"X5YD,LF,HOOK&LP: Brand: MEDLINE

## (undated) DEVICE — Device

## (undated) DEVICE — TRANSPOSAL ULTRAFLEX DUO/QUAD ULTRA CART MANIFOLD

## (undated) DEVICE — GLV SURG SENSICARE PI MIC PF SZ8 LF STRL

## (undated) DEVICE — LAG MINOR PROCEDURE: Brand: MEDLINE INDUSTRIES, INC.

## (undated) DEVICE — BNDG,ELSTC,MATRIX,STRL,4"X5YD,LF,HOOK&LP: Brand: MEDLINE

## (undated) DEVICE — FRCP BX RADJAW4 NDL 2.8 240CM LG OG BX40

## (undated) DEVICE — GLV SURG SENSICARE W/ALOE PF LF 6.5 STRL

## (undated) DEVICE — PAD,NON-ADHERENT,3X8,STERILE,LF,1/PK: Brand: MEDLINE